# Patient Record
Sex: MALE | Race: WHITE | NOT HISPANIC OR LATINO | Employment: OTHER | URBAN - METROPOLITAN AREA
[De-identification: names, ages, dates, MRNs, and addresses within clinical notes are randomized per-mention and may not be internally consistent; named-entity substitution may affect disease eponyms.]

---

## 2017-05-10 ENCOUNTER — TRANSFERRED RECORDS (OUTPATIENT)
Dept: HEALTH INFORMATION MANAGEMENT | Facility: CLINIC | Age: 67
End: 2017-05-10

## 2017-07-25 ENCOUNTER — TRANSFERRED RECORDS (OUTPATIENT)
Dept: HEALTH INFORMATION MANAGEMENT | Facility: CLINIC | Age: 67
End: 2017-07-25

## 2017-09-23 ENCOUNTER — TRANSFERRED RECORDS (OUTPATIENT)
Dept: HEALTH INFORMATION MANAGEMENT | Facility: CLINIC | Age: 67
End: 2017-09-23

## 2017-09-28 ENCOUNTER — MEDICAL CORRESPONDENCE (OUTPATIENT)
Dept: HEALTH INFORMATION MANAGEMENT | Facility: CLINIC | Age: 67
End: 2017-09-28

## 2018-03-07 ENCOUNTER — AMBULATORY - HEALTHEAST (OUTPATIENT)
Dept: ADMINISTRATIVE | Facility: CLINIC | Age: 68
End: 2018-03-07

## 2018-03-12 ENCOUNTER — OFFICE VISIT - HEALTHEAST (OUTPATIENT)
Dept: GERIATRICS | Facility: CLINIC | Age: 68
End: 2018-03-12

## 2018-03-12 DIAGNOSIS — G81.94 LEFT HEMIPARESIS (H): ICD-10-CM

## 2018-03-12 DIAGNOSIS — E11.9 TYPE 2 DIABETES MELLITUS (H): ICD-10-CM

## 2018-03-12 DIAGNOSIS — I10 HYPERTENSION: ICD-10-CM

## 2018-03-12 DIAGNOSIS — I63.9 ACUTE CVA (CEREBROVASCULAR ACCIDENT) (H): ICD-10-CM

## 2018-03-12 DIAGNOSIS — R47.02 DYSPHASIA: ICD-10-CM

## 2018-03-12 DIAGNOSIS — I25.10 CORONARY ARTERY DISEASE: ICD-10-CM

## 2018-03-13 ENCOUNTER — OFFICE VISIT - HEALTHEAST (OUTPATIENT)
Dept: GERIATRICS | Facility: CLINIC | Age: 68
End: 2018-03-13

## 2018-03-13 DIAGNOSIS — I25.10 CORONARY ARTERY DISEASE: ICD-10-CM

## 2018-03-13 DIAGNOSIS — I63.9 ACUTE CVA (CEREBROVASCULAR ACCIDENT) (H): ICD-10-CM

## 2018-03-13 DIAGNOSIS — E11.9 TYPE 2 DIABETES MELLITUS (H): ICD-10-CM

## 2018-03-13 DIAGNOSIS — R47.02 DYSPHASIA: ICD-10-CM

## 2018-03-13 DIAGNOSIS — I10 HYPERTENSION: ICD-10-CM

## 2018-03-13 DIAGNOSIS — G81.94 LEFT HEMIPARESIS (H): ICD-10-CM

## 2018-03-13 DIAGNOSIS — E78.5 HYPERLIPIDEMIA: ICD-10-CM

## 2018-03-15 ENCOUNTER — RECORDS - HEALTHEAST (OUTPATIENT)
Dept: LAB | Facility: CLINIC | Age: 68
End: 2018-03-15

## 2018-03-15 ENCOUNTER — OFFICE VISIT - HEALTHEAST (OUTPATIENT)
Dept: GERIATRICS | Facility: CLINIC | Age: 68
End: 2018-03-15

## 2018-03-15 DIAGNOSIS — I25.10 CORONARY ARTERY DISEASE: ICD-10-CM

## 2018-03-15 DIAGNOSIS — M25.512 LEFT SHOULDER PAIN: ICD-10-CM

## 2018-03-15 DIAGNOSIS — E11.9 TYPE 2 DIABETES MELLITUS (H): ICD-10-CM

## 2018-03-15 DIAGNOSIS — I63.9 ACUTE CVA (CEREBROVASCULAR ACCIDENT) (H): ICD-10-CM

## 2018-03-15 DIAGNOSIS — R09.02 HYPOXIA: ICD-10-CM

## 2018-03-15 DIAGNOSIS — I10 HYPERTENSION: ICD-10-CM

## 2018-03-15 DIAGNOSIS — G81.94 LEFT HEMIPARESIS (H): ICD-10-CM

## 2018-03-15 DIAGNOSIS — R47.02 DYSPHASIA: ICD-10-CM

## 2018-03-15 LAB
ANION GAP SERPL CALCULATED.3IONS-SCNC: 6 MMOL/L (ref 5–18)
BUN SERPL-MCNC: 12 MG/DL (ref 8–22)
CALCIUM SERPL-MCNC: 8.8 MG/DL (ref 8.5–10.5)
CHLORIDE BLD-SCNC: 108 MMOL/L (ref 98–107)
CO2 SERPL-SCNC: 28 MMOL/L (ref 22–31)
CREAT SERPL-MCNC: 0.82 MG/DL (ref 0.7–1.3)
ERYTHROCYTE [DISTWIDTH] IN BLOOD BY AUTOMATED COUNT: 12.4 % (ref 11–14.5)
GFR SERPL CREATININE-BSD FRML MDRD: >60 ML/MIN/1.73M2
GLUCOSE BLD-MCNC: 141 MG/DL (ref 70–125)
HCT VFR BLD AUTO: 31 % (ref 40–54)
HGB BLD-MCNC: 10.6 G/DL (ref 14–18)
MCH RBC QN AUTO: 32.5 PG (ref 27–34)
MCHC RBC AUTO-ENTMCNC: 34.2 G/DL (ref 32–36)
MCV RBC AUTO: 95 FL (ref 80–100)
PLATELET # BLD AUTO: 308 THOU/UL (ref 140–440)
PMV BLD AUTO: 10.5 FL (ref 8.5–12.5)
POTASSIUM BLD-SCNC: 3.8 MMOL/L (ref 3.5–5)
RBC # BLD AUTO: 3.26 MILL/UL (ref 4.4–6.2)
SODIUM SERPL-SCNC: 142 MMOL/L (ref 136–145)
WBC: 6.6 THOU/UL (ref 4–11)

## 2018-03-19 ENCOUNTER — OFFICE VISIT - HEALTHEAST (OUTPATIENT)
Dept: GERIATRICS | Facility: CLINIC | Age: 68
End: 2018-03-19

## 2018-03-19 DIAGNOSIS — I63.9 CVA (CEREBRAL VASCULAR ACCIDENT) (H): ICD-10-CM

## 2018-03-19 DIAGNOSIS — R13.10 DYSPHAGIA: ICD-10-CM

## 2018-03-19 DIAGNOSIS — G81.94 LEFT HEMIPARESIS (H): ICD-10-CM

## 2018-03-19 DIAGNOSIS — I10 HYPERTENSION: ICD-10-CM

## 2018-03-19 DIAGNOSIS — R47.1 DYSARTHRIA: ICD-10-CM

## 2018-03-20 ENCOUNTER — RECORDS - HEALTHEAST (OUTPATIENT)
Dept: LAB | Facility: CLINIC | Age: 68
End: 2018-03-20

## 2018-03-20 LAB
C DIFF TOX B STL QL: NEGATIVE
RIBOTYPE 027/NAP1/BI: NORMAL

## 2018-03-21 ENCOUNTER — RECORDS - HEALTHEAST (OUTPATIENT)
Dept: LAB | Facility: CLINIC | Age: 68
End: 2018-03-21

## 2018-03-22 ENCOUNTER — OFFICE VISIT - HEALTHEAST (OUTPATIENT)
Dept: GERIATRICS | Facility: CLINIC | Age: 68
End: 2018-03-22

## 2018-03-22 DIAGNOSIS — R47.1 DYSARTHRIA: ICD-10-CM

## 2018-03-22 DIAGNOSIS — E11.9 DIABETES MELLITUS (H): ICD-10-CM

## 2018-03-22 DIAGNOSIS — I63.9 CVA (CEREBRAL VASCULAR ACCIDENT) (H): ICD-10-CM

## 2018-03-22 DIAGNOSIS — R13.10 DYSPHAGIA: ICD-10-CM

## 2018-03-22 LAB — HGB BLD-MCNC: 11.3 G/DL (ref 14–18)

## 2018-03-26 ENCOUNTER — RECORDS - HEALTHEAST (OUTPATIENT)
Dept: LAB | Facility: CLINIC | Age: 68
End: 2018-03-26

## 2018-03-26 ENCOUNTER — OFFICE VISIT - HEALTHEAST (OUTPATIENT)
Dept: GERIATRICS | Facility: CLINIC | Age: 68
End: 2018-03-26

## 2018-03-26 DIAGNOSIS — R04.0 EPISTAXIS: ICD-10-CM

## 2018-03-26 DIAGNOSIS — I69.921 DYSPHASIA DUE TO CEREBROVASCULAR DISEASE: ICD-10-CM

## 2018-03-26 DIAGNOSIS — R19.7 DIARRHEA: ICD-10-CM

## 2018-03-26 DIAGNOSIS — R11.2 NAUSEA AND VOMITING: ICD-10-CM

## 2018-03-26 LAB
ANION GAP SERPL CALCULATED.3IONS-SCNC: 16 MMOL/L (ref 5–18)
BASOPHILS # BLD AUTO: 0.1 THOU/UL (ref 0–0.2)
BASOPHILS NFR BLD AUTO: 1 % (ref 0–2)
BNP SERPL-MCNC: <10 PG/ML (ref 0–65)
BUN SERPL-MCNC: 18 MG/DL (ref 8–22)
CALCIUM SERPL-MCNC: 9.6 MG/DL (ref 8.5–10.5)
CHLORIDE BLD-SCNC: 101 MMOL/L (ref 98–107)
CO2 SERPL-SCNC: 24 MMOL/L (ref 22–31)
CREAT SERPL-MCNC: 0.79 MG/DL (ref 0.7–1.3)
EOSINOPHIL # BLD AUTO: 0.4 THOU/UL (ref 0–0.4)
EOSINOPHIL NFR BLD AUTO: 4 % (ref 0–6)
ERYTHROCYTE [DISTWIDTH] IN BLOOD BY AUTOMATED COUNT: 12.6 % (ref 11–14.5)
GFR SERPL CREATININE-BSD FRML MDRD: >60 ML/MIN/1.73M2
GLUCOSE BLD-MCNC: 67 MG/DL (ref 70–125)
HCT VFR BLD AUTO: 35.2 % (ref 40–54)
HGB BLD-MCNC: 12 G/DL (ref 14–18)
LYMPHOCYTES # BLD AUTO: 0.9 THOU/UL (ref 0.8–4.4)
LYMPHOCYTES NFR BLD AUTO: 9 % (ref 20–40)
MCH RBC QN AUTO: 31.9 PG (ref 27–34)
MCHC RBC AUTO-ENTMCNC: 34.1 G/DL (ref 32–36)
MCV RBC AUTO: 94 FL (ref 80–100)
MONOCYTES # BLD AUTO: 0.8 THOU/UL (ref 0–0.9)
MONOCYTES NFR BLD AUTO: 7 % (ref 2–10)
NEUTROPHILS # BLD AUTO: 8.2 THOU/UL (ref 2–7.7)
NEUTROPHILS NFR BLD AUTO: 79 % (ref 50–70)
PLATELET # BLD AUTO: 307 THOU/UL (ref 140–440)
PMV BLD AUTO: 10.2 FL (ref 8.5–12.5)
POTASSIUM BLD-SCNC: 4.1 MMOL/L (ref 3.5–5)
RBC # BLD AUTO: 3.76 MILL/UL (ref 4.4–6.2)
SODIUM SERPL-SCNC: 141 MMOL/L (ref 136–145)
WBC: 10.2 THOU/UL (ref 4–11)

## 2018-03-27 ENCOUNTER — OFFICE VISIT - HEALTHEAST (OUTPATIENT)
Dept: GERIATRICS | Facility: CLINIC | Age: 68
End: 2018-03-27

## 2018-03-27 DIAGNOSIS — G81.94 LEFT HEMIPARESIS (H): ICD-10-CM

## 2018-03-27 DIAGNOSIS — E11.9 DIABETES MELLITUS (H): ICD-10-CM

## 2018-03-27 DIAGNOSIS — I10 HYPERTENSION: ICD-10-CM

## 2018-03-27 DIAGNOSIS — R04.0 EPISTAXIS: ICD-10-CM

## 2018-03-27 DIAGNOSIS — R00.0 TACHYCARDIA: ICD-10-CM

## 2018-03-27 DIAGNOSIS — I25.10 CORONARY ARTERY DISEASE: ICD-10-CM

## 2018-03-27 DIAGNOSIS — R09.81 NASAL CONGESTION: ICD-10-CM

## 2018-03-27 DIAGNOSIS — I69.921 DYSPHASIA DUE TO CEREBROVASCULAR DISEASE: ICD-10-CM

## 2018-03-27 DIAGNOSIS — I63.9 CVA (CEREBRAL VASCULAR ACCIDENT) (H): ICD-10-CM

## 2018-03-27 LAB
ALBUMIN UR-MCNC: ABNORMAL MG/DL
AMORPH CRY #/AREA URNS HPF: ABNORMAL /[HPF]
APPEARANCE UR: ABNORMAL
BACTERIA #/AREA URNS HPF: ABNORMAL HPF
BILIRUB UR QL STRIP: NEGATIVE
CAOX CRY #/AREA URNS HPF: PRESENT /[HPF]
COLOR UR AUTO: ABNORMAL
GLUCOSE UR STRIP-MCNC: NEGATIVE MG/DL
HGB UR QL STRIP: NEGATIVE
HYALINE CASTS #/AREA URNS LPF: ABNORMAL LPF
KETONES UR STRIP-MCNC: ABNORMAL MG/DL
LEUKOCYTE ESTERASE UR QL STRIP: NEGATIVE
MUCOUS THREADS #/AREA URNS LPF: ABNORMAL LPF
NITRATE UR QL: NEGATIVE
PH UR STRIP: 6 [PH] (ref 4.5–8)
RBC #/AREA URNS AUTO: ABNORMAL HPF
SP GR UR STRIP: 1.03 (ref 1–1.03)
SQUAMOUS #/AREA URNS AUTO: ABNORMAL LPF
UROBILINOGEN UR STRIP-ACNC: ABNORMAL
WBC #/AREA URNS AUTO: ABNORMAL HPF
WBC CLUMPS #/AREA URNS HPF: ABNORMAL /[HPF]

## 2018-03-28 ENCOUNTER — RECORDS - HEALTHEAST (OUTPATIENT)
Dept: ADMINISTRATIVE | Facility: OTHER | Age: 68
End: 2018-03-28

## 2018-03-28 LAB — BACTERIA SPEC CULT: NO GROWTH

## 2018-03-29 ENCOUNTER — OFFICE VISIT - HEALTHEAST (OUTPATIENT)
Dept: GERIATRICS | Facility: CLINIC | Age: 68
End: 2018-03-29

## 2018-03-29 DIAGNOSIS — G81.94 LEFT HEMIPARESIS (H): ICD-10-CM

## 2018-03-29 DIAGNOSIS — I25.10 CORONARY ARTERY DISEASE: ICD-10-CM

## 2018-03-29 DIAGNOSIS — Z91.89 AT RISK FOR ASPIRATION: ICD-10-CM

## 2018-03-29 DIAGNOSIS — I63.9 CVA (CEREBRAL VASCULAR ACCIDENT) (H): ICD-10-CM

## 2018-03-29 DIAGNOSIS — I10 HYPERTENSION: ICD-10-CM

## 2018-03-29 DIAGNOSIS — E11.9 DIABETES MELLITUS (H): ICD-10-CM

## 2018-03-29 DIAGNOSIS — R05.9 COUGH: ICD-10-CM

## 2018-03-29 DIAGNOSIS — R00.0 TACHYCARDIA: ICD-10-CM

## 2018-03-29 DIAGNOSIS — I69.921 DYSPHASIA DUE TO CEREBROVASCULAR DISEASE: ICD-10-CM

## 2018-04-03 ENCOUNTER — OFFICE VISIT - HEALTHEAST (OUTPATIENT)
Dept: GERIATRICS | Facility: CLINIC | Age: 68
End: 2018-04-03

## 2018-04-03 DIAGNOSIS — I63.9 CVA (CEREBRAL VASCULAR ACCIDENT) (H): ICD-10-CM

## 2018-04-03 DIAGNOSIS — E11.9 DIABETES MELLITUS (H): ICD-10-CM

## 2018-04-03 DIAGNOSIS — R05.9 COUGH: ICD-10-CM

## 2018-04-03 DIAGNOSIS — G81.94 LEFT HEMIPARESIS (H): ICD-10-CM

## 2018-04-03 DIAGNOSIS — I69.921 DYSPHASIA DUE TO CEREBROVASCULAR DISEASE: ICD-10-CM

## 2018-04-03 DIAGNOSIS — K21.9 GERD (GASTROESOPHAGEAL REFLUX DISEASE): ICD-10-CM

## 2018-04-03 DIAGNOSIS — R00.0 TACHYCARDIA: ICD-10-CM

## 2018-04-03 DIAGNOSIS — R11.2 NAUSEA AND VOMITING: ICD-10-CM

## 2018-04-05 ENCOUNTER — OFFICE VISIT - HEALTHEAST (OUTPATIENT)
Dept: GERIATRICS | Facility: CLINIC | Age: 68
End: 2018-04-05

## 2018-04-05 DIAGNOSIS — I25.10 CORONARY ARTERY DISEASE: ICD-10-CM

## 2018-04-05 DIAGNOSIS — K21.9 GERD (GASTROESOPHAGEAL REFLUX DISEASE): ICD-10-CM

## 2018-04-05 DIAGNOSIS — I63.9 CVA (CEREBRAL VASCULAR ACCIDENT) (H): ICD-10-CM

## 2018-04-05 DIAGNOSIS — I69.921 DYSPHASIA DUE TO CEREBROVASCULAR DISEASE: ICD-10-CM

## 2018-04-05 DIAGNOSIS — Z91.89 AT RISK FOR ASPIRATION: ICD-10-CM

## 2018-04-05 DIAGNOSIS — I10 HYPERTENSION: ICD-10-CM

## 2018-04-05 DIAGNOSIS — G81.94 LEFT HEMIPARESIS (H): ICD-10-CM

## 2018-04-05 DIAGNOSIS — E11.9 DIABETES MELLITUS (H): ICD-10-CM

## 2018-04-09 ENCOUNTER — RECORDS - HEALTHEAST (OUTPATIENT)
Dept: LAB | Facility: CLINIC | Age: 68
End: 2018-04-09

## 2018-04-09 ENCOUNTER — OFFICE VISIT - HEALTHEAST (OUTPATIENT)
Dept: GERIATRICS | Facility: CLINIC | Age: 68
End: 2018-04-09

## 2018-04-09 ENCOUNTER — RECORDS - HEALTHEAST (OUTPATIENT)
Dept: ADMINISTRATIVE | Facility: OTHER | Age: 68
End: 2018-04-09

## 2018-04-09 DIAGNOSIS — R05.8 PRODUCTIVE COUGH: ICD-10-CM

## 2018-04-09 DIAGNOSIS — G81.94 LEFT HEMIPARESIS (H): ICD-10-CM

## 2018-04-09 DIAGNOSIS — I69.921 DYSPHASIA DUE TO CEREBROVASCULAR DISEASE: ICD-10-CM

## 2018-04-09 DIAGNOSIS — R06.02 SHORTNESS OF BREATH: ICD-10-CM

## 2018-04-09 LAB
BNP SERPL-MCNC: 28 PG/ML (ref 0–65)
HGB BLD-MCNC: 11.5 G/DL (ref 14–18)
WBC: 10.3 THOU/UL (ref 4–11)

## 2018-04-10 ENCOUNTER — RECORDS - HEALTHEAST (OUTPATIENT)
Dept: LAB | Facility: CLINIC | Age: 68
End: 2018-04-10

## 2018-04-10 ENCOUNTER — OFFICE VISIT - HEALTHEAST (OUTPATIENT)
Dept: GERIATRICS | Facility: CLINIC | Age: 68
End: 2018-04-10

## 2018-04-10 DIAGNOSIS — I10 HYPERTENSION: ICD-10-CM

## 2018-04-10 DIAGNOSIS — K21.9 GERD (GASTROESOPHAGEAL REFLUX DISEASE): ICD-10-CM

## 2018-04-10 DIAGNOSIS — I63.9 CVA (CEREBRAL VASCULAR ACCIDENT) (H): ICD-10-CM

## 2018-04-10 DIAGNOSIS — G81.94 LEFT HEMIPARESIS (H): ICD-10-CM

## 2018-04-10 DIAGNOSIS — J44.9 COPD (CHRONIC OBSTRUCTIVE PULMONARY DISEASE) (H): ICD-10-CM

## 2018-04-10 DIAGNOSIS — R05.8 PRODUCTIVE COUGH: ICD-10-CM

## 2018-04-10 DIAGNOSIS — I69.921 DYSPHASIA DUE TO CEREBROVASCULAR DISEASE: ICD-10-CM

## 2018-04-10 DIAGNOSIS — R06.02 SHORTNESS OF BREATH: ICD-10-CM

## 2018-04-10 LAB
ALBUMIN UR-MCNC: ABNORMAL MG/DL
APPEARANCE UR: CLEAR
BACTERIA #/AREA URNS HPF: ABNORMAL HPF
BILIRUB UR QL STRIP: NEGATIVE
COLOR UR AUTO: YELLOW
GLUCOSE UR STRIP-MCNC: NEGATIVE MG/DL
HGB UR QL STRIP: ABNORMAL
HYALINE CASTS #/AREA URNS LPF: ABNORMAL LPF
KETONES UR STRIP-MCNC: NEGATIVE MG/DL
LEUKOCYTE ESTERASE UR QL STRIP: NEGATIVE
MUCOUS THREADS #/AREA URNS LPF: ABNORMAL LPF
NITRATE UR QL: NEGATIVE
PH UR STRIP: 6.5 [PH] (ref 4.5–8)
RBC #/AREA URNS AUTO: ABNORMAL HPF
SP GR UR STRIP: 1.03 (ref 1–1.03)
SPERM #/AREA URNS HPF: PRESENT /[HPF]
SQUAMOUS #/AREA URNS AUTO: ABNORMAL LPF
UROBILINOGEN UR STRIP-ACNC: ABNORMAL
WBC #/AREA URNS AUTO: ABNORMAL HPF

## 2018-04-11 LAB — BACTERIA SPEC CULT: NO GROWTH

## 2018-04-12 ENCOUNTER — OFFICE VISIT - HEALTHEAST (OUTPATIENT)
Dept: GERIATRICS | Facility: CLINIC | Age: 68
End: 2018-04-12

## 2018-04-12 DIAGNOSIS — E11.9 DIABETES MELLITUS (H): ICD-10-CM
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
## 2018-04-12 DIAGNOSIS — I63.9 CVA (CEREBRAL VASCULAR ACCIDENT) (H): ICD-10-CM

## 2018-04-12 DIAGNOSIS — G81.94 LEFT HEMIPARESIS (H): ICD-10-CM

## 2018-04-12 DIAGNOSIS — I69.921 DYSPHASIA DUE TO CEREBROVASCULAR DISEASE: ICD-10-CM

## 2018-04-12 DIAGNOSIS — I10 HYPERTENSION: ICD-10-CM

## 2018-04-12 DIAGNOSIS — J44.9 COPD (CHRONIC OBSTRUCTIVE PULMONARY DISEASE) (H): ICD-10-CM

## 2018-04-16 ENCOUNTER — OFFICE VISIT - HEALTHEAST (OUTPATIENT)
Dept: GERIATRICS | Facility: CLINIC | Age: 68
End: 2018-04-16

## 2018-04-16 DIAGNOSIS — I63.9 CVA (CEREBRAL VASCULAR ACCIDENT) (H): ICD-10-CM

## 2018-04-16 DIAGNOSIS — R13.10 DYSPHAGIA: ICD-10-CM

## 2018-04-16 DIAGNOSIS — I10 HYPERTENSION: ICD-10-CM

## 2018-04-16 DIAGNOSIS — J44.9 COPD (CHRONIC OBSTRUCTIVE PULMONARY DISEASE) (H): ICD-10-CM

## 2018-04-19 ENCOUNTER — OFFICE VISIT - HEALTHEAST (OUTPATIENT)
Dept: GERIATRICS | Facility: CLINIC | Age: 68
End: 2018-04-19

## 2018-04-19 DIAGNOSIS — R13.10 DYSPHAGIA: ICD-10-CM

## 2018-04-19 DIAGNOSIS — I63.9 CVA (CEREBRAL VASCULAR ACCIDENT) (H): ICD-10-CM

## 2018-04-19 DIAGNOSIS — G81.94 LEFT HEMIPARESIS (H): ICD-10-CM

## 2018-04-19 DIAGNOSIS — J44.9 COPD (CHRONIC OBSTRUCTIVE PULMONARY DISEASE) (H): ICD-10-CM

## 2018-04-20 ENCOUNTER — RECORDS - HEALTHEAST (OUTPATIENT)
Dept: ADMINISTRATIVE | Facility: OTHER | Age: 68
End: 2018-04-20

## 2018-04-24 ENCOUNTER — OFFICE VISIT - HEALTHEAST (OUTPATIENT)
Dept: GERIATRICS | Facility: CLINIC | Age: 68
End: 2018-04-24

## 2018-04-24 DIAGNOSIS — J44.9 COPD (CHRONIC OBSTRUCTIVE PULMONARY DISEASE) (H): ICD-10-CM

## 2018-04-24 DIAGNOSIS — R05.9 COUGH: ICD-10-CM

## 2018-04-24 DIAGNOSIS — I63.9 CVA (CEREBRAL VASCULAR ACCIDENT) (H): ICD-10-CM

## 2018-04-24 DIAGNOSIS — E11.9 DIABETES MELLITUS (H): ICD-10-CM

## 2018-04-24 DIAGNOSIS — G81.94 LEFT HEMIPARESIS (H): ICD-10-CM

## 2018-04-25 ENCOUNTER — RECORDS - HEALTHEAST (OUTPATIENT)
Dept: LAB | Facility: CLINIC | Age: 68
End: 2018-04-25

## 2018-04-25 LAB
ALBUMIN UR-MCNC: ABNORMAL MG/DL
APPEARANCE UR: CLEAR
BACTERIA #/AREA URNS HPF: ABNORMAL HPF
BILIRUB UR QL STRIP: NEGATIVE
COLOR UR AUTO: YELLOW
GLUCOSE UR STRIP-MCNC: NEGATIVE MG/DL
HGB BLD-MCNC: 11.2 G/DL (ref 14–18)
HGB UR QL STRIP: ABNORMAL
KETONES UR STRIP-MCNC: NEGATIVE MG/DL
LEUKOCYTE ESTERASE UR QL STRIP: NEGATIVE
MUCOUS THREADS #/AREA URNS LPF: ABNORMAL LPF
NITRATE UR QL: NEGATIVE
PH UR STRIP: 7 [PH] (ref 4.5–8)
RBC #/AREA URNS AUTO: ABNORMAL HPF
SP GR UR STRIP: 1.02 (ref 1–1.03)
SQUAMOUS #/AREA URNS AUTO: ABNORMAL LPF
UROBILINOGEN UR STRIP-ACNC: ABNORMAL
WBC #/AREA URNS AUTO: ABNORMAL HPF

## 2018-04-26 ENCOUNTER — OFFICE VISIT - HEALTHEAST (OUTPATIENT)
Dept: GERIATRICS | Facility: CLINIC | Age: 68
End: 2018-04-26

## 2018-04-26 DIAGNOSIS — J44.9 COPD (CHRONIC OBSTRUCTIVE PULMONARY DISEASE) (H): ICD-10-CM

## 2018-04-26 DIAGNOSIS — G81.94 LEFT HEMIPARESIS (H): ICD-10-CM

## 2018-04-26 DIAGNOSIS — R05.9 COUGH: ICD-10-CM

## 2018-04-26 DIAGNOSIS — I25.10 CORONARY ARTERY DISEASE: ICD-10-CM

## 2018-04-26 DIAGNOSIS — I10 HYPERTENSION: ICD-10-CM

## 2018-04-26 DIAGNOSIS — R13.10 DYSPHAGIA: ICD-10-CM

## 2018-04-26 DIAGNOSIS — I63.9 CVA (CEREBRAL VASCULAR ACCIDENT) (H): ICD-10-CM

## 2018-04-26 DIAGNOSIS — N50.1 BLEEDING OF PENIS: ICD-10-CM

## 2018-04-26 LAB — BACTERIA SPEC CULT: NO GROWTH

## 2018-05-01 ENCOUNTER — OFFICE VISIT - HEALTHEAST (OUTPATIENT)
Dept: GERIATRICS | Facility: CLINIC | Age: 68
End: 2018-05-01

## 2018-05-01 DIAGNOSIS — I10 HYPERTENSION: ICD-10-CM

## 2018-05-01 DIAGNOSIS — G81.94 LEFT HEMIPARESIS (H): ICD-10-CM

## 2018-05-01 DIAGNOSIS — R13.10 DYSPHAGIA: ICD-10-CM

## 2018-05-01 DIAGNOSIS — I63.9 CVA (CEREBRAL VASCULAR ACCIDENT) (H): ICD-10-CM

## 2018-05-01 DIAGNOSIS — J44.9 COPD (CHRONIC OBSTRUCTIVE PULMONARY DISEASE) (H): ICD-10-CM

## 2018-05-01 DIAGNOSIS — E11.9 DIABETES MELLITUS (H): ICD-10-CM

## 2018-05-03 ENCOUNTER — OFFICE VISIT - HEALTHEAST (OUTPATIENT)
Dept: GERIATRICS | Facility: CLINIC | Age: 68
End: 2018-05-03

## 2018-05-03 DIAGNOSIS — G81.94 LEFT HEMIPARESIS (H): ICD-10-CM

## 2018-05-03 DIAGNOSIS — I63.9 CVA (CEREBRAL VASCULAR ACCIDENT) (H): ICD-10-CM

## 2018-05-03 DIAGNOSIS — J44.9 COPD (CHRONIC OBSTRUCTIVE PULMONARY DISEASE) (H): ICD-10-CM

## 2018-05-03 DIAGNOSIS — I10 HYPERTENSION: ICD-10-CM

## 2018-05-05 ENCOUNTER — RECORDS - HEALTHEAST (OUTPATIENT)
Dept: ADMINISTRATIVE | Facility: OTHER | Age: 68
End: 2018-05-05

## 2018-05-08 ENCOUNTER — OFFICE VISIT - HEALTHEAST (OUTPATIENT)
Dept: GERIATRICS | Facility: CLINIC | Age: 68
End: 2018-05-08

## 2018-05-08 DIAGNOSIS — G81.94 LEFT HEMIPARESIS (H): ICD-10-CM

## 2018-05-08 DIAGNOSIS — J44.9 COPD (CHRONIC OBSTRUCTIVE PULMONARY DISEASE) (H): ICD-10-CM

## 2018-05-08 DIAGNOSIS — E11.9 DIABETES MELLITUS (H): ICD-10-CM

## 2018-05-08 DIAGNOSIS — I63.9 CVA (CEREBRAL VASCULAR ACCIDENT) (H): ICD-10-CM

## 2018-05-09 ENCOUNTER — RECORDS - HEALTHEAST (OUTPATIENT)
Dept: LAB | Facility: CLINIC | Age: 68
End: 2018-05-09

## 2018-05-09 LAB
ANION GAP SERPL CALCULATED.3IONS-SCNC: 8 MMOL/L (ref 5–18)
BNP SERPL-MCNC: 24 PG/ML (ref 0–65)
BUN SERPL-MCNC: 18 MG/DL (ref 8–22)
CALCIUM SERPL-MCNC: 9.3 MG/DL (ref 8.5–10.5)
CHLORIDE BLD-SCNC: 104 MMOL/L (ref 98–107)
CO2 SERPL-SCNC: 29 MMOL/L (ref 22–31)
CREAT SERPL-MCNC: 0.84 MG/DL (ref 0.7–1.3)
ERYTHROCYTE [DISTWIDTH] IN BLOOD BY AUTOMATED COUNT: 13.3 % (ref 11–14.5)
GFR SERPL CREATININE-BSD FRML MDRD: >60 ML/MIN/1.73M2
GLUCOSE BLD-MCNC: 99 MG/DL (ref 70–125)
HCT VFR BLD AUTO: 37.5 % (ref 40–54)
HGB BLD-MCNC: 12.4 G/DL (ref 14–18)
MCH RBC QN AUTO: 30.4 PG (ref 27–34)
MCHC RBC AUTO-ENTMCNC: 33.1 G/DL (ref 32–36)
MCV RBC AUTO: 92 FL (ref 80–100)
PLATELET # BLD AUTO: 240 THOU/UL (ref 140–440)
PMV BLD AUTO: 10.7 FL (ref 8.5–12.5)
POTASSIUM BLD-SCNC: 4 MMOL/L (ref 3.5–5)
PSA SERPL-MCNC: 2.4 NG/ML (ref 0–4.5)
RBC # BLD AUTO: 4.08 MILL/UL (ref 4.4–6.2)
SODIUM SERPL-SCNC: 141 MMOL/L (ref 136–145)
WBC: 11.5 THOU/UL (ref 4–11)

## 2018-05-11 ENCOUNTER — OFFICE VISIT - HEALTHEAST (OUTPATIENT)
Dept: GERIATRICS | Facility: CLINIC | Age: 68
End: 2018-05-11

## 2018-05-11 DIAGNOSIS — J44.9 COPD (CHRONIC OBSTRUCTIVE PULMONARY DISEASE) (H): ICD-10-CM

## 2018-05-11 DIAGNOSIS — G81.94 LEFT HEMIPARESIS (H): ICD-10-CM

## 2018-05-11 DIAGNOSIS — I63.9 CVA (CEREBRAL VASCULAR ACCIDENT) (H): ICD-10-CM

## 2018-05-11 DIAGNOSIS — E11.9 DIABETES MELLITUS (H): ICD-10-CM

## 2018-05-15 ENCOUNTER — OFFICE VISIT - HEALTHEAST (OUTPATIENT)
Dept: GERIATRICS | Facility: CLINIC | Age: 68
End: 2018-05-15

## 2018-05-15 DIAGNOSIS — I63.9 CVA (CEREBRAL VASCULAR ACCIDENT) (H): ICD-10-CM

## 2018-05-15 DIAGNOSIS — J44.9 COPD (CHRONIC OBSTRUCTIVE PULMONARY DISEASE) (H): ICD-10-CM

## 2018-05-15 DIAGNOSIS — G81.94 LEFT HEMIPARESIS (H): ICD-10-CM

## 2018-05-15 DIAGNOSIS — E11.9 DIABETES MELLITUS (H): ICD-10-CM

## 2018-05-16 ENCOUNTER — RECORDS - HEALTHEAST (OUTPATIENT)
Dept: LAB | Facility: CLINIC | Age: 68
End: 2018-05-16

## 2018-05-16 LAB
ERYTHROCYTE [DISTWIDTH] IN BLOOD BY AUTOMATED COUNT: 13.6 % (ref 11–14.5)
HCT VFR BLD AUTO: 39.9 % (ref 40–54)
HGB BLD-MCNC: 13 G/DL (ref 14–18)
MCH RBC QN AUTO: 30.3 PG (ref 27–34)
MCHC RBC AUTO-ENTMCNC: 32.6 G/DL (ref 32–36)
MCV RBC AUTO: 93 FL (ref 80–100)
PLATELET # BLD AUTO: 265 THOU/UL (ref 140–440)
PMV BLD AUTO: 10.8 FL (ref 8.5–12.5)
PROCALCITONIN SERPL-MCNC: 0.02 NG/ML (ref 0–0.49)
RBC # BLD AUTO: 4.29 MILL/UL (ref 4.4–6.2)
WBC: 11.6 THOU/UL (ref 4–11)

## 2018-05-17 ENCOUNTER — OFFICE VISIT - HEALTHEAST (OUTPATIENT)
Dept: GERIATRICS | Facility: CLINIC | Age: 68
End: 2018-05-17

## 2018-05-17 ENCOUNTER — RECORDS - HEALTHEAST (OUTPATIENT)
Dept: ADMINISTRATIVE | Facility: OTHER | Age: 68
End: 2018-05-17

## 2018-05-17 DIAGNOSIS — J44.1 COPD EXACERBATION (H): ICD-10-CM

## 2018-05-17 DIAGNOSIS — E11.9 DIABETES MELLITUS (H): ICD-10-CM

## 2018-05-17 DIAGNOSIS — J44.9 COPD (CHRONIC OBSTRUCTIVE PULMONARY DISEASE) (H): ICD-10-CM

## 2018-05-21 ENCOUNTER — RECORDS - HEALTHEAST (OUTPATIENT)
Dept: LAB | Facility: CLINIC | Age: 68
End: 2018-05-21

## 2018-05-21 ENCOUNTER — COMMUNICATION - HEALTHEAST (OUTPATIENT)
Dept: PULMONOLOGY | Facility: OTHER | Age: 68
End: 2018-05-21

## 2018-05-21 ENCOUNTER — AMBULATORY - HEALTHEAST (OUTPATIENT)
Dept: PULMONOLOGY | Facility: OTHER | Age: 68
End: 2018-05-21

## 2018-05-21 DIAGNOSIS — J44.9 COPD (CHRONIC OBSTRUCTIVE PULMONARY DISEASE) (H): ICD-10-CM

## 2018-05-21 LAB
ERYTHROCYTE [DISTWIDTH] IN BLOOD BY AUTOMATED COUNT: 13.3 % (ref 11–14.5)
HCT VFR BLD AUTO: 37.1 % (ref 40–54)
HGB BLD-MCNC: 12.5 G/DL (ref 14–18)
MCH RBC QN AUTO: 30.5 PG (ref 27–34)
MCHC RBC AUTO-ENTMCNC: 33.7 G/DL (ref 32–36)
MCV RBC AUTO: 91 FL (ref 80–100)
PLATELET # BLD AUTO: 285 THOU/UL (ref 140–440)
PMV BLD AUTO: 10.5 FL (ref 8.5–12.5)
RBC # BLD AUTO: 4.1 MILL/UL (ref 4.4–6.2)
WBC: 11.9 THOU/UL (ref 4–11)

## 2018-05-22 ENCOUNTER — OFFICE VISIT - HEALTHEAST (OUTPATIENT)
Dept: GERIATRICS | Facility: CLINIC | Age: 68
End: 2018-05-22

## 2018-05-22 DIAGNOSIS — J44.9 COPD (CHRONIC OBSTRUCTIVE PULMONARY DISEASE) (H): ICD-10-CM

## 2018-05-22 DIAGNOSIS — J44.1 COPD EXACERBATION (H): ICD-10-CM

## 2018-05-22 DIAGNOSIS — F48.2 PSEUDOBULBAR AFFECT: ICD-10-CM

## 2018-05-22 DIAGNOSIS — I25.10 CORONARY ARTERY DISEASE: ICD-10-CM

## 2018-05-22 DIAGNOSIS — E11.9 DIABETES MELLITUS (H): ICD-10-CM

## 2018-05-22 DIAGNOSIS — G81.94 LEFT HEMIPARESIS (H): ICD-10-CM

## 2018-05-22 DIAGNOSIS — I63.9 CVA (CEREBRAL VASCULAR ACCIDENT) (H): ICD-10-CM

## 2018-05-23 ENCOUNTER — AMBULATORY - HEALTHEAST (OUTPATIENT)
Dept: GERIATRICS | Facility: CLINIC | Age: 68
End: 2018-05-23

## 2018-05-23 ENCOUNTER — MEDICAL CORRESPONDENCE (OUTPATIENT)
Dept: HEALTH INFORMATION MANAGEMENT | Facility: CLINIC | Age: 68
End: 2018-05-23

## 2018-05-25 ENCOUNTER — MEDICAL CORRESPONDENCE (OUTPATIENT)
Dept: HEALTH INFORMATION MANAGEMENT | Facility: CLINIC | Age: 68
End: 2018-05-25

## 2018-05-29 ENCOUNTER — OFFICE VISIT (OUTPATIENT)
Dept: FAMILY MEDICINE | Facility: CLINIC | Age: 68
End: 2018-05-29
Payer: MEDICARE

## 2018-05-29 ENCOUNTER — COMMUNICATION - HEALTHEAST (OUTPATIENT)
Dept: PULMONOLOGY | Facility: OTHER | Age: 68
End: 2018-05-29

## 2018-05-29 VITALS
DIASTOLIC BLOOD PRESSURE: 76 MMHG | HEART RATE: 132 BPM | HEIGHT: 69 IN | WEIGHT: 156 LBS | OXYGEN SATURATION: 91 % | SYSTOLIC BLOOD PRESSURE: 120 MMHG | BODY MASS INDEX: 23.11 KG/M2 | TEMPERATURE: 97.4 F

## 2018-05-29 DIAGNOSIS — Z86.73 HISTORY OF CVA (CEREBROVASCULAR ACCIDENT): ICD-10-CM

## 2018-05-29 DIAGNOSIS — G81.94 LEFT HEMIPARESIS (H): ICD-10-CM

## 2018-05-29 DIAGNOSIS — E11.9 TYPE 2 DIABETES MELLITUS WITHOUT COMPLICATION, WITHOUT LONG-TERM CURRENT USE OF INSULIN (H): ICD-10-CM

## 2018-05-29 DIAGNOSIS — R00.0 TACHYCARDIA: ICD-10-CM

## 2018-05-29 DIAGNOSIS — I25.10 CORONARY ARTERY DISEASE INVOLVING NATIVE HEART WITHOUT ANGINA PECTORIS, UNSPECIFIED VESSEL OR LESION TYPE: Primary | ICD-10-CM

## 2018-05-29 DIAGNOSIS — Z09 HOSPITAL DISCHARGE FOLLOW-UP: ICD-10-CM

## 2018-05-29 PROCEDURE — 99203 OFFICE O/P NEW LOW 30 MIN: CPT | Performed by: NURSE PRACTITIONER

## 2018-05-29 PROCEDURE — 93000 ELECTROCARDIOGRAM COMPLETE: CPT | Performed by: NURSE PRACTITIONER

## 2018-05-29 RX ORDER — LISINOPRIL 20 MG/1
20 TABLET ORAL
COMMUNITY
Start: 2012-04-06 | End: 2018-05-31

## 2018-05-29 RX ORDER — ASPIRIN 325 MG
325 TABLET ORAL
COMMUNITY
Start: 2012-04-06

## 2018-05-29 NOTE — NURSING NOTE
"Initial /76 (BP Location: Right arm, Patient Position: Chair, Cuff Size: Adult Regular)  Pulse 132  Temp 97.4  F (36.3  C) (Tympanic)  Ht 5' 9\" (1.753 m)  Wt 156 lb (70.8 kg)  SpO2 91%  BMI 23.04 kg/m2 Estimated body mass index is 23.04 kg/(m^2) as calculated from the following:    Height as of this encounter: 5' 9\" (1.753 m).    Weight as of this encounter: 156 lb (70.8 kg). .    Rhiannon Gonzales    "

## 2018-05-29 NOTE — NURSING NOTE
"Initial /76 (BP Location: Right arm, Patient Position: Chair, Cuff Size: Adult Regular)  Pulse 132  Temp 97.4  F (36.3  C) (Tympanic)  Ht 5' 9\" (1.753 m)  Wt 156 lb (70.8 kg)  BMI 23.04 kg/m2 Estimated body mass index is 23.04 kg/(m^2) as calculated from the following:    Height as of this encounter: 5' 9\" (1.753 m).    Weight as of this encounter: 156 lb (70.8 kg). .    Rhiannon Gonzales    "

## 2018-05-29 NOTE — LETTER
[facility]  [address] [facility] [address]  [phone]        July 5, 2018    Tariq Pinzon  [address] [place] [address]  [facility] [address]              Dear Tariq Pinzon    This is to remind you that your Hgb A1C, Fasting Lipid profile and Liver Panel is due.    You may call our office at [phone] to schedule an appointment.    Please disregard this notice if you have already had your labs drawn or made an appointment.        Sincerely,        Jordana Rosenberg CNP

## 2018-05-29 NOTE — PROGRESS NOTES
SUBJECTIVE:   Tariq Pinzon is a 68 year old male who presents to clinic today for the following health issues:      New Patient/Transfer of Care-         Hospital Follow-up Visit: TCU    Hospital/Nursing Home/IP Rehab Facility: [facility]  Date of Admission:   Date of Discharge: 05/22/18  Reason(s) for Admission: CVA            Problems taking medications regularly:  None       Medication changes since discharge: Clopidogrel was D/C's and changed to ASA daily       Problems adhering to non-medication therapy:  None    Summary of hospitalization:  CareEverywhere information obtained and reviewed  Diagnostic Tests/Treatments reviewed.  Follow up needed: pulmonary  Other Healthcare Providers Involved in Patient s Care:         Care Coordination, Specialist appointment - pulmonary and OT and Physical Therapy  Update since discharge: improved.     Post Discharge Medication Reconciliation: discharge medications reconciled, continue medications without change.  Plan of care communicated with patient and family     Coding guidelines for this visit:  Type of Medical   Decision Making Face-to-Face Visit       within 7 Days of discharge Face-to-Face Visit        within 14 days of discharge   Moderate Complexity [id number]   High Complexity [id number]          Patient new to clinic- medical chart reviewed with patient and his daughter. Feeling well- still having left sided weakness. History of CVD, DIABETES MELLITUS and COPD. Wears O2 at night- has appointment with neurology and pulmonary but would like at [facility].     Problem list and histories reviewed & adjusted, as indicated.  Additional history: as documented    Patient Active Problem List   Diagnosis     COPD (chronic obstructive pulmonary disease) (H)     Essential hypertension     Old myocardial infarction     Pulmonary nodule, left     Mixed hyperlipidemia     Past Surgical History:   Procedure Laterality Date     NO HISTORY OF SURGERY        "  Social History   Substance Use Topics     Smoking status: Former Smoker     Packs/day: 2.00     Years: 38.00     Smokeless tobacco: Former User     Quit date: 1/1/2007      Comment: Quit several years back     Alcohol use No     Family History   Problem Relation Age of Onset     Asthma Mother      Asthma Maternal Grandmother      Respiratory Father      Lung Cancer     Respiratory Daughter      Asthma in all 4 daughter     DIABETES Mother      DIABETES Daughter            Reviewed and updated as needed this visit by clinical staff       Reviewed and updated as needed this visit by Provider         ROS:  Constitutional, HEENT, cardiovascular, pulmonary, GI, , musculoskeletal, neuro, skin, endocrine and psych systems are negative, except as otherwise noted.    OBJECTIVE:     /76 (BP Location: Right arm, Patient Position: Chair, Cuff Size: Adult Regular)  Pulse 132  Temp 97.4  F (36.3  C) (Tympanic)  Ht 5' 9\" (1.753 m)  Wt 156 lb (70.8 kg)  SpO2 91%  BMI 23.04 kg/m2  Body mass index is 23.04 kg/(m^2).  GENERAL: healthy, alert and no distress  RESP: lungs clear to auscultation - no rales, rhonchi or wheezes  CV: Tachycardia, normal S1 S2, no S3 or S4, no murmur, click or rub, no peripheral edema and peripheral pulses strong  MS: left sided hemiparesis     Diagnostic Test Results:  none     ASSESSMENT/PLAN:       1. Hospital discharge follow-up    - CARE COORDINATION REFERRAL    2. History of CVA (cerebrovascular accident)    - NEUROLOGY ADULT REFERRAL  - CARE COORDINATION REFERRAL    3. Coronary artery disease involving native heart without angina pectoris, unspecified vessel or lesion type    - CARDIOLOGY EVAL ADULT REFERRAL  - Lipid panel reflex to direct LDL Fasting; Future  - ALT; Future    4. Type 2 diabetes mellitus without complication, without long-term current use of insulin (H)    - Hemoglobin A1c; Future    5. Left hemiparesis (H)    - NEUROLOGY ADULT REFERRAL  - CARE COORDINATION " REFERRAL    6. Tachycardia    - EKG 12-lead complete w/read - Clinics    Follow up in 2 weeks     NEO Parada CNP  [facility]

## 2018-05-29 NOTE — PATIENT INSTRUCTIONS
1. Schedule an appointment with Cardiology and Neurology  2. Schedule cholesterol fasting lab work  3. Follow up in 3 months          Thank you for choosing [facility].  You may be receiving a survey in the mail from [employer] regarding your visit today.  Please take a few minutes to complete and return the survey to let us know how we are doing.      If you have questions or concerns, please contact us via Multimedia Plus | [employer] or you can contact your care team at [phone].    Our Clinic hours are:  Monday 6:40 am  to 7:00 pm  Tuesday -Friday 6:40 am to 5:00 pm    The [place] outpatient lab hours are:  Monday - Friday 6:10 am to 4:45 pm  Saturdays 7:00 am to 11:00 am  Appointments are required, call [phone]    If you have clinical questions after hours or would like to schedule an appointment,  call the clinic at [phone].

## 2018-05-29 NOTE — LETTER
[facility]  [address] [facility] [address]  [phone]        August 10, 2018    Tariq Pinzon  [address] [place] [address]              Dear Tariq Pinzon    This is to remind you that your fasting blood work is due. Water is fine.    You may call our office at [phone] to schedule an appointment.    Please disregard this notice if you have already had your labs drawn or made an appointment.        Sincerely,        Jordana Rosenberg MD

## 2018-05-29 NOTE — MR AVS SNAPSHOT
After Visit Summary   5/29/2018    Tariq Pinzon    MRN: [id number]           Patient Information     Date Of Birth          1950        Visit Information        Provider Department      5/29/2018 12:40 PM Jordana Rosenberg APRN [facility]        Today's Diagnoses     Coronary artery disease involving native heart without angina pectoris, unspecified vessel or lesion type    -  1    History of CVA (cerebrovascular accident)        Left hemiparesis (H)        Hospital discharge follow-up          Care Instructions    1. Schedule an appointment with Cardiology and Neurology  2. Schedule cholesterol fasting lab work  3. Follow up in 3 months          Thank you for choosing [facility].  You may be receiving a survey in the mail from [employer] regarding your visit today.  Please take a few minutes to complete and return the survey to let us know how we are doing.      If you have questions or concerns, please contact us via HelpHive or you can contact your care team at [phone].    Our Clinic hours are:  Monday 6:40 am  to 7:00 pm  Tuesday -Friday 6:40 am to 5:00 pm    The [place] outpatient lab hours are:  Monday - Friday 6:10 am to 4:45 pm  Saturdays 7:00 am to 11:00 am  Appointments are required, call [phone]    If you have clinical questions after hours or would like to schedule an appointment,  call the clinic at [phone].          Follow-ups after your visit        Additional Services     CARDIOLOGY EVAL ADULT REFERRAL       Your provider has referred you to:  [facility] [phone]   [email or web address]    Please be aware that coverage of these services is subject to the terms and limitations of your health insurance plan.  Call member services at your health plan with any benefit or coverage questions.      Type of Referral:  New Cardiology Consult    Timeframe requested:  Within  1 month    Please bring the following to your appointment:  >>   Any x-rays, CTs or MRIs which have been performed.  Contact the facility where they were done to arrange for  prior to your scheduled appointment.  Any new CT, MRI or other procedures ordered by your specialist must be performed at a [facility] or coordinated by your clinic's referral office.   >>   List of current medications  >>   This referral request   >>   Any documents/labs given to you for this referral            CARE COORDINATION REFERRAL       Services are provided by a care coordinator for people with complex needs such as; medical, social, or  financial issues.  The care coordinator works with the patient and their primary care provider to determine health goals, obtain resources, achieve outcomes, and develop care plans that help coordinate a patient's care.     REFERRAL REASON:   Care Transition: Home care discharge and TCU discharge    Provide additional details for Care Coordination to best meet Patient's current needs: patient moved to live with daughter- patient from [place], [place] now living with daughter in [place]- - needs help transferring care/ insurance issues.     Clinic Staff has discussed Care Coordination Referral with the Patient/Caregiver: yes            NEUROLOGY ADULT REFERRAL       Your provider has referred you for the following:   Consult at [facility]: [facility] [phone]   [email or web address]    Please be aware that coverage of these services is subject to the terms and limitations of your health insurance plan.  Call member services at your health plan with any benefit or coverage questions.      Please bring the following with you to your appointment:    (1) Any X-Rays, CTs or MRIs which have been performed.  Contact the facility where they were done to arrange for  prior to your scheduled appointment.    (2) List of current medications  (3) This  "referral request   (4) Any documents/labs given to you for this referral                  Future tests that were ordered for you today     Open Future Orders        Priority Expected Expires Ordered    Lipid panel reflex to direct LDL Fasting Routine  6/29/2018 5/29/2018    ALT Routine  6/29/2018 5/29/2018            Who to contact     If you have questions or need follow up information about today's clinic visit or your schedule please contact [facility] directly at [phone].  Normal or non-critical lab and imaging results will be communicated to you by MyChart, letter or phone within 4 business days after the clinic has received the results. If you do not hear from us within 7 days, please contact the clinic through MyChart or phone. If you have a critical or abnormal lab result, we will notify you by phone as soon as possible.  Submit refill requests through [employer] or call your pharmacy and they will forward the refill request to us. Please allow 3 business days for your refill to be completed.          Additional Information About Your Visit        Care EveryWhere ID     This is your Care EveryWhere ID. This could be used by other organizations to access your [place] medical records  [id number]        Your Vitals Were     Pulse Temperature Height Pulse Oximetry BMI (Body Mass Index)       132 97.4  F (36.3  C) (Tympanic) 5' 9\" (1.753 m) 91% 23.04 kg/m2        Blood Pressure from Last 3 Encounters:   05/29/18 120/76   10/16/12 108/66   07/25/12 122/72    Weight from Last 3 Encounters:   05/29/18 156 lb (70.8 kg)   10/16/12 171 lb 14.4 oz (78 kg)   07/25/12 168 lb 8 oz (76.4 kg)              We Performed the Following     CARDIOLOGY EVAL ADULT REFERRAL     CARE COORDINATION REFERRAL     NEUROLOGY ADULT REFERRAL          Today's Medication Changes          These changes are accurate as of 5/29/18  1:33 PM.  If you have any questions, ask your nurse or doctor.               Stop taking these " medicines if you haven't already. Please contact your care team if you have questions.     aspirin 81 MG tablet           clopidogrel 75 MG tablet   Commonly known as:  PLAVIX                    Primary Care Provider Office Phone # Fax #    NEO Parada [phone] [address] [facility] [address]        Equal Access to Services     JOSE MARTIN URIARTE : Hadii aad ku hadasho Soomaali, waaxda luqadaha, qaybta kaalmada adeegyada, waxay idiin hayaan adeeg kharash la'kaushal . So [facility] [phone].    ATENCIÓN: Si habla español, tiene a acosta disposición servicios gratuitos de asistencia lingüística. Amari al [phone].    We comply with applicable federal civil rights laws and [place] laws. We do not discriminate on the basis of race, color, national origin, age, disability, sex, sexual orientation, or gender identity.            Thank you!     Thank you for choosing [facility]  for your care. Our goal is always to provide you with excellent care. Hearing back from our patients is one way we can continue to improve our services. Please take a few minutes to complete the written survey that you may receive in the mail after your visit with us. Thank you!             Your Updated Medication List - Protect others around you: Learn how to safely use, store and throw away your medicines at www.disposemymeds.org.          This list is accurate as of 5/29/18  1:33 PM.  Always use your most recent med list.                   Brand Name Dispense Instructions for use Diagnosis    fluticasone-salmeterol 250-50 MCG/DOSE diskus inhaler    ADVAIR DISKUS    3 Inhaler    Inhale 1 puff into the lungs every 12 hours.    COPD with emphysema (H)       ipratropium 17 MCG/ACT Inhaler    ATROVENT HFA    1 Inhaler    Inhale 2 puffs into the lungs every 6 hours.    COPD with emphysema (H)       LIPITOR 40 MG tablet   Generic drug:  atorvastatin     30 tablet    Take 1 tablet by mouth At Bedtime.     Mixed hyperlipidemia       lisinopril-hydrochlorothiazide 20-25 MG per tablet    PRINZIDE/ZESTORETIC    60 tablet    Take 1 tablet by mouth 2 times daily. Needs appt before further refills    Unspecified essential hypertension       metoprolol tartrate 25 MG tablet    LOPRESSOR    180 tablet    Take 1 tablet by mouth 2 times daily.    Old myocardial infarction       nitroGLYcerin 0.4 MG sublingual tablet    NITROSTAT     Place 0.4 mg under the tongue every 5 minutes as needed.        * PROVENTIL (2.5 MG/3ML) 0.083% neb solution   Generic drug:  albuterol      Take 1 ampule by nebulization every 4 hours.        * albuterol 108 (90 Base) MCG/ACT Inhaler    PROAIR HFA/PROVENTIL HFA/VENTOLIN HFA    1 Inhaler    Inhale 2 puffs into the lungs every 4 hours.    COPD with emphysema (H)       * Notice:  This list has 2 medication(s) that are the same as other medications prescribed for you. Read the directions carefully, and ask your doctor or other care provider to review them with you.

## 2018-05-30 ENCOUNTER — MEDICAL CORRESPONDENCE (OUTPATIENT)
Dept: HEALTH INFORMATION MANAGEMENT | Facility: CLINIC | Age: 68
End: 2018-05-30

## 2018-05-30 ENCOUNTER — NURSE TRIAGE (OUTPATIENT)
Dept: NURSING | Facility: CLINIC | Age: 68
End: 2018-05-30

## 2018-05-30 ENCOUNTER — TELEPHONE (OUTPATIENT)
Dept: FAMILY MEDICINE | Facility: CLINIC | Age: 68
End: 2018-05-30

## 2018-05-30 DIAGNOSIS — E78.2 MIXED HYPERLIPIDEMIA: ICD-10-CM

## 2018-05-30 DIAGNOSIS — I25.2 OLD MYOCARDIAL INFARCTION: ICD-10-CM

## 2018-05-30 NOTE — TELEPHONE ENCOUNTER
Selvin, home care nurse for Tariq called about getting Tariq's medications ordered.  There are some that they have enough of for now.  The meds needed ASAP are:  Glipizide 10 mg one tab twice daily  Januvia 100 mg every day  Lisinopril 20 mg every day  Atorvastatin 40 mg every night  Metoprolol succinate 25 mg every day  Omeprazole 20 mg every day  Mucinex 600 mg four times daily  Any question, call Selvin, [phone].  Preferred pharmacy [facility] in [place]  Routed: [id number]  Mervat CONNER RN [facility]

## 2018-05-30 NOTE — TELEPHONE ENCOUNTER
Selvin, home care nurse for Tariq called about getting Tariq's medications ordered.  There are some that they have enough of for now.  The meds needed ASAP are:  Glipizide 10 mg one tab twice daily  Januvia 100 mg every day  Lisinopril 20 mg every day  Atorvastatin 40 mg every night  Metoprolol succinate 25 mg every day  Omeprazole 20 mg every day  Mucinex 600 mg four times daily  Any question, call Selvin, [phone].  Preferred pharmacy [facility] in [place]  Routed: [id number]  Mervat CONNER RN [facility]

## 2018-05-31 ENCOUNTER — PATIENT OUTREACH (OUTPATIENT)
Dept: CARE COORDINATION | Facility: CLINIC | Age: 68
End: 2018-05-31

## 2018-05-31 RX ORDER — ATORVASTATIN CALCIUM 40 MG/1
40 TABLET, FILM COATED ORAL AT BEDTIME
Qty: 90 TABLET | Refills: 3 | Status: SHIPPED | OUTPATIENT
Start: 2018-05-31

## 2018-05-31 RX ORDER — GLIPIZIDE 10 MG/1
10 TABLET ORAL
Qty: 180 TABLET | Refills: 3 | Status: SHIPPED | OUTPATIENT
Start: 2018-05-31

## 2018-05-31 RX ORDER — LISINOPRIL 20 MG/1
20 TABLET ORAL DAILY
Qty: 90 TABLET | Refills: 3 | Status: SHIPPED | OUTPATIENT
Start: 2018-05-31

## 2018-05-31 RX ORDER — METOPROLOL TARTRATE 25 MG/1
25 TABLET, FILM COATED ORAL 2 TIMES DAILY
Qty: 180 TABLET | Refills: 3 | Status: SHIPPED | OUTPATIENT
Start: 2018-05-31

## 2018-05-31 ASSESSMENT — ACTIVITIES OF DAILY LIVING (ADL)
DEPENDENT_IADLS:: CLEANING;COOKING;LAUNDRY;SHOPPING;MEAL PREPARATION;MEDICATION MANAGEMENT;MONEY MANAGEMENT;TRANSPORTATION

## 2018-05-31 NOTE — PROGRESS NOTES
"Clinic Care Coordination Contact  Care Team Conversations    SW received referral from PCP to assist pt with, \"Care Transition: Home care discharge and TCU discharge.  Patient moved to live with daughter- patient from [place], [place] now living with daughter in [place]- - needs help transferring care/ insurance issues.\"    CHLOE placed call today and spoke with pt's daughter, Reba.  SATHYA is being scanned into Epic per chart notes.  Chloe introduced, self, title and role.  Per Reba, she informed this writer that her dad moved into her home after a long hospitalization and TCU stay due to his inability to care for himself.  Reba also shared that she has no concerns at home at this time, as her dad is receiving Home Care services in the form of a RN to set up his medications & oversee his medical needs, PT/OT for his physical rehab and a HHA to assist with his personal care needs.    Albetty also shared that she is pregnant and when she goes into labor and delivery, the pt cannot be left alone.  She has Rhoda from [employer] coming to the home at 12 noon today to work with her on creating a respite plan of care for the pt, whether it be a TCU stay or creating 24/7 cares in the home, that will be determined in the next week or so.  Pt has Medicare and [employer] insurance and Reba voiced no concerns with bills, coverage issues or payments at this time.    CHLOE asked Reba if she is having difficulties with appointment scheduling or transferring cares to the [facility] and her reply was that she has all of the pt's appointments already scheduled as all of the clinics have called her for all appointments.  He is scheduled for Pulmonology (which will cover his questions for the need for oxygen) and cardiology.    CHLOE asked Reba if this writer could mail letter of introduction so that she had this writer's name & contact information for when after Home Care closed and things settled down in their home; she " agreed and welcomed this communication.    SW will not open pt to cares at this time, but will remain available should future needs arise.      Zunilda Tran  Social Work Care Coordinator  [facility]  [phone]

## 2018-05-31 NOTE — LETTER
Health Care Home - Access Care Plan    About Me:  Patient Name:  Tariq Pinzon    YOB: 1950  Age:                        68 year old   [place] MRN:       [id number] Telephone Information:   Home Phone [phone]   Mobile [phone]       Address:  [address] [place] [address] Email address:  No e-mail address on record      Emergency Contact(s)  Name Relationship Lgl Grd Work Phone Home Phone Mobile Phone   1JAMES GARIBAY* Daughter   [phone]             Health Maintenance: Routine Health maintenance Reviewed: Due/Overdue     My Access Plan  Medical Emergency 911   Questions or concerns during clinic hours Primary Clinic Line, I will call the clinic directly: [facility] - [phone]   24 Hour Appointment Line [phone] or  [phone]) (toll free)   24 Hour Nurse Line [phone] (toll free)   Questions or concerns outside clinic hours 24 Hour Appointment Line, I will call the after-hours on-call line:   [facility] [phone] or [phone]) (toll-free)   Preferred Urgent Care [facility], [phone]   Preferred Hospital [place], [place]  [phone]   Preferred Pharmacy [facility] [place] [place] - [facility]     Behavioral Health Crisis Line The National Suicide Prevention Lifeline at 1-295.331.2362 or 911           My Care Team Members  Patient Care Team       Relationship Specialty Notifications Start End    Jordana Rosenberg APRN CNP PCP - General Nurse Practitioner  5/29/18     Phone: [phone] Fax: [phone] [address] [facility] [address]           My Medical and Care Information  Problem List   Patient Active Problem List   Diagnosis     COPD (chronic obstructive pulmonary disease) (H)     Essential hypertension     Old myocardial infarction     Pulmonary nodule, left     Mixed  hyperlipidemia      Current Medications and Allergies:  See printed Medication Report

## 2018-05-31 NOTE — LETTER
[facility] CARE COORDINATION  [address] [place] [address]  [phone]      May 31, 2018    Tariq Pinzon  [address] [facility] [address]  [facility] [address]      Dear Tariq & Reba,    I am a clinic care coordinator who works with NEO Parada CNP at the [facility]. I wanted to thank Reba for spending the time to talk with me today on the phone.  I also wanted to provide you with my contact information so that you can call me with questions or concerns about your health care. Below is a description of clinic care coordination and how I can further assist you.     The clinic care coordinator is a registered nurse and/or  who understand the health care system. The goal of clinic care coordination is to help you manage your health and improve access to the [facility] system in the most efficient manner. The registered nurse can assist you in meeting your health care goals by providing education, coordinating services, and strengthening the communication among your providers. The  can assist you with financial, behavioral, psychosocial, chemical dependency, counseling, and/or psychiatric resources.    Please feel free to contact me at [phone], with any questions or concerns. We at [facility] are focused on providing you with the highest-quality healthcare experience possible and that all starts with you.     Sincerely,     Zunilda Uribe    Enclosed: I have enclosed a copy of a 24 Hour Access Plan. This has helpful phone numbers for you to call when needed. Please keep this in an easy to access place to use as needed.

## 2018-05-31 NOTE — TELEPHONE ENCOUNTER
Attempted to call the patient and no answer and unable to leave message.  Called the daughter and informed her of meds being refilled. Kelli STILL RN

## 2018-05-31 NOTE — TELEPHONE ENCOUNTER
Emily,    Medications are pended for your approval.  The Glipizide, Januvia, and omeprazole are new to us.  Kelli STILL RN

## 2018-06-06 ENCOUNTER — MEDICAL CORRESPONDENCE (OUTPATIENT)
Dept: HEALTH INFORMATION MANAGEMENT | Facility: CLINIC | Age: 68
End: 2018-06-06

## 2018-06-06 ENCOUNTER — PATIENT OUTREACH (OUTPATIENT)
Dept: CARE COORDINATION | Facility: CLINIC | Age: 68
End: 2018-06-06

## 2018-06-06 ENCOUNTER — OFFICE VISIT (OUTPATIENT)
Dept: FAMILY MEDICINE | Facility: CLINIC | Age: 68
End: 2018-06-06
Payer: MEDICARE

## 2018-06-06 VITALS
BODY MASS INDEX: 23.55 KG/M2 | HEIGHT: 69 IN | OXYGEN SATURATION: 94 % | SYSTOLIC BLOOD PRESSURE: 109 MMHG | HEART RATE: 84 BPM | TEMPERATURE: 98.4 F | WEIGHT: 159 LBS | DIASTOLIC BLOOD PRESSURE: 76 MMHG

## 2018-06-06 DIAGNOSIS — J43.9 PULMONARY EMPHYSEMA, UNSPECIFIED EMPHYSEMA TYPE (H): ICD-10-CM

## 2018-06-06 DIAGNOSIS — I10 ESSENTIAL HYPERTENSION: ICD-10-CM

## 2018-06-06 DIAGNOSIS — I25.10 CORONARY ARTERY DISEASE INVOLVING NATIVE HEART WITHOUT ANGINA PECTORIS, UNSPECIFIED VESSEL OR LESION TYPE: ICD-10-CM

## 2018-06-06 DIAGNOSIS — Z86.73 HISTORY OF CVA (CEREBROVASCULAR ACCIDENT): Primary | ICD-10-CM

## 2018-06-06 DIAGNOSIS — G81.94 LEFT HEMIPARESIS (H): ICD-10-CM

## 2018-06-06 DIAGNOSIS — E78.2 MIXED HYPERLIPIDEMIA: ICD-10-CM

## 2018-06-06 DIAGNOSIS — Z79.82 ENCOUNTER FOR LONG-TERM (CURRENT) USE OF ASPIRIN: ICD-10-CM

## 2018-06-06 PROBLEM — F10.11 HISTORY OF ALCOHOL ABUSE: Status: ACTIVE | Noted: 2018-06-06

## 2018-06-06 PROBLEM — Z87.891 PERSONAL HISTORY OF TOBACCO USE, PRESENTING HAZARDS TO HEALTH: Status: ACTIVE | Noted: 2018-06-06

## 2018-06-06 PROCEDURE — G0180 MD CERTIFICATION HHA PATIENT: HCPCS | Performed by: FAMILY MEDICINE

## 2018-06-06 PROCEDURE — 99215 OFFICE O/P EST HI 40 MIN: CPT | Performed by: NURSE PRACTITIONER

## 2018-06-06 RX ORDER — ALBUTEROL SULFATE 90 UG/1
2 AEROSOL, METERED RESPIRATORY (INHALATION) EVERY 4 HOURS
Qty: 1 INHALER | Refills: 2 | Status: SHIPPED | OUTPATIENT
Start: 2018-06-06

## 2018-06-06 RX ORDER — PREDNISONE 5 MG/1
5 TABLET ORAL DAILY
Qty: 30 TABLET | Refills: 11 | Status: SHIPPED | OUTPATIENT
Start: 2018-06-06

## 2018-06-06 NOTE — MR AVS SNAPSHOT
After Visit Summary   6/6/2018    Tariq Pinzon    MRN: [id number]           Patient Information     Date Of Birth          1950        Visit Information        Provider Department      6/6/2018 8:40 AM Jordana Rosenberg APRN CNP [facility]        Today's Diagnoses     Pulmonary emphysema, unspecified emphysema type (H)          Care Instructions    Confirm dose of Metoprolol and Lisinopril    prescriptions           Thank you for choosing [facility].  You may be receiving a survey in the mail from Ariadna Tadeo regarding your visit today.  Please take a few minutes to complete and return the survey to let us know how we are doing.      If you have questions or concerns, please contact us via Teak or you can contact your care team at [phone].    Our Clinic hours are:  Monday 6:40 am  to 7:00 pm  Tuesday -Friday 6:40 am to 5:00 pm    The [place] outpatient lab hours are:  Monday - Friday 6:10 am to 4:45 pm  Saturdays 7:00 am to 11:00 am  Appointments are required, call [phone]    If you have clinical questions after hours or would like to schedule an appointment,  call the clinic at [phone].          Follow-ups after your visit        Your next 10 appointments already scheduled     Jun 21, 2018 11:30 AM CDT   New Visit with Bora Rebolledo MD   [facility] ([facility])    [address] [address] [phone]              Future tests that were ordered for you today     Open Future Orders        Priority Expected Expires Ordered    Oxygen Routine  6/6/2019 6/6/2018            Who to contact     If you have questions or need follow up information about today's clinic visit or your schedule please contact [facility] directly at [phone].  Normal or non-critical lab and imaging results will be communicated to you by MyChart, letter or phone within 4 business days  after the clinic has received the results. If you do not hear from us within 7 days, please contact the clinic through BeneStream or phone. If you have a critical or abnormal lab result, we will notify you by phone as soon as possible.  Submit refill requests through BeneStream or call your pharmacy and they will forward the refill request to us. Please allow 3 business days for your refill to be completed.          Additional Information About Your Visit        Care EveryWhere ID     This is your Care EveryWhere ID. This could be used by other organizations to access your [place] medical records  [id number]        Your Vitals Were     Pulse Temperature Pulse Oximetry BMI (Body Mass Index)          84 98.4  F (36.9  C) (Tympanic) 94% 23.04 kg/m2         Blood Pressure from Last 3 Encounters:   06/06/18 109/76   05/29/18 120/76   10/16/12 108/66    Weight from Last 3 Encounters:   06/06/18 156 lb (70.8 kg)   05/29/18 156 lb (70.8 kg)   10/16/12 171 lb 14.4 oz (78 kg)                 Today's Medication Changes          These changes are accurate as of 6/6/18  9:16 AM.  If you have any questions, ask your nurse or doctor.               Start taking these medicines.        Dose/Directions    predniSONE 5 MG tablet   Commonly known as:  DELTASONE   Used for:  Pulmonary emphysema, unspecified emphysema type (H)   Started by:  Jordana Rosenberg APRN CNP        Dose:  5 mg   Take 1 tablet (5 mg) by mouth daily   Quantity:  30 tablet   Refills:  11         These medicines have changed or have updated prescriptions.        Dose/Directions    fluticasone-salmeterol 250-50 MCG/DOSE diskus inhaler   Commonly known as:  ADVAIR   This may have changed:    - when to take this  - Another medication with the same name was removed. Continue taking this medication, and follow the directions you see here.   Used for:  Pulmonary emphysema, unspecified emphysema type (H)   Changed by:  Jordana Rosenberg APRN CNP        Dose:  1 puff    Inhale 1 puff into the lungs 2 times daily   Quantity:  1 Inhaler   Refills:  5       glipiZIDE 10 MG tablet   Commonly known as:  GLUCOTROL   This may have changed:  Another medication with the same name was removed. Continue taking this medication, and follow the directions you see here.   Used for:  Old myocardial infarction   Changed by:  Jordana Rosenberg APRN CNP        Dose:  10 mg   Take 1 tablet (10 mg) by mouth 2 times daily (before meals)   Quantity:  180 tablet   Refills:  3       sitagliptin 100 MG tablet   Commonly known as:  JANUVIA   This may have changed:  Another medication with the same name was removed. Continue taking this medication, and follow the directions you see here.   Used for:  Old myocardial infarction   Changed by:  Jordana Rosenberg APRN CNP        Dose:  100 mg   Take 1 tablet (100 mg) by mouth daily   Quantity:  90 tablet   Refills:  3         Stop taking these medicines if you haven't already. Please contact your care team if you have questions.     LEVAQUIN PO   Stopped by:  Jordana Rosenberg APRN CNP           lisinopril-hydrochlorothiazide 20-25 MG per tablet   Commonly known as:  PRINZIDE/ZESTORETIC   Stopped by:  Jordana Rosenberg APRN CNP                Where to get your medicines      These medications were sent to [facility] [address]   [address] [facility] [address]     Phone:  [phone]     albuterol 108 (90 Base) MCG/ACT Inhaler    fluticasone-salmeterol 250-50 MCG/DOSE diskus inhaler    predniSONE 5 MG tablet                Primary Care Provider Office Phone # Fax #    NEO Parada [phone] [address] [facility] [address]        Equal Access to Services     JOSE MARTIN URIARTE : Angela Benoit, wajulioda jenny, qaybta kaalmada yong, cassandra quiñones. So [facility] [phone].    ATENCIÓN: Si habla español, tiene a acosta disposición servicios  tacho de asistencia lingüística. Amari harrell [phone].    We comply with applicable federal civil rights laws and [place] laws. We do not discriminate on the basis of race, color, national origin, age, disability, sex, sexual orientation, or gender identity.            Thank you!     Thank you for choosing [facility]  for your care. Our goal is always to provide you with excellent care. Hearing back from our patients is one way we can continue to improve our services. Please take a few minutes to complete the written survey that you may receive in the mail after your visit with us. Thank you!             Your Updated Medication List - Protect others around you: Learn how to safely use, store and throw away your medicines at www.disposemymeds.org.          This list is accurate as of 6/6/18  9:16 AM.  Always use your most recent med list.                   Brand Name Dispense Instructions for use Diagnosis    aspirin 325 MG tablet      325 mg        atorvastatin 40 MG tablet    LIPITOR    90 tablet    Take 1 tablet (40 mg) by mouth At Bedtime    Mixed hyperlipidemia       fluticasone-salmeterol 250-50 MCG/DOSE diskus inhaler    ADVAIR    1 Inhaler    Inhale 1 puff into the lungs 2 times daily    Pulmonary emphysema, unspecified emphysema type (H)       glipiZIDE 10 MG tablet    GLUCOTROL    180 tablet    Take 1 tablet (10 mg) by mouth 2 times daily (before meals)    Old myocardial infarction       ipratropium 17 MCG/ACT Inhaler    ATROVENT HFA    1 Inhaler    Inhale 2 puffs into the lungs every 6 hours.    COPD with emphysema (H)       lisinopril 20 MG tablet    PRINIVIL/ZESTRIL    90 tablet    Take 1 tablet (20 mg) by mouth daily    Old myocardial infarction       metoprolol tartrate 25 MG tablet    LOPRESSOR    180 tablet    Take 1 tablet (25 mg) by mouth 2 times daily    Old myocardial infarction       nitroGLYcerin 0.4 MG sublingual tablet    NITROSTAT     Place 0.4 mg under the tongue every  5 minutes as needed.        omeprazole 20 MG CR capsule    priLOSEC    90 capsule    Take 1 capsule (20 mg) by mouth daily    Old myocardial infarction       predniSONE 5 MG tablet    DELTASONE    30 tablet    Take 1 tablet (5 mg) by mouth daily    Pulmonary emphysema, unspecified emphysema type (H)       * PROVENTIL (2.5 MG/3ML) 0.083% neb solution   Generic drug:  albuterol      Take 1 ampule by nebulization every 4 hours.        * albuterol 108 (90 Base) MCG/ACT Inhaler    PROAIR HFA/PROVENTIL HFA/VENTOLIN HFA    1 Inhaler    Inhale 2 puffs into the lungs every 4 hours    Pulmonary emphysema, unspecified emphysema type (H)       sitagliptin 100 MG tablet    JANUVIA    90 tablet    Take 1 tablet (100 mg) by mouth daily    Old myocardial infarction       TYLENOL PO      Take 325 mg by mouth 325 in the am, 650mg po prn for pain        * Notice:  This list has 2 medication(s) that are the same as other medications prescribed for you. Read the directions carefully, and ask your doctor or other care provider to review them with you.

## 2018-06-06 NOTE — NURSING NOTE
"Initial /76 (BP Location: Right arm, Patient Position: Chair, Cuff Size: Adult Regular)  Pulse 84  Temp 98.4  F (36.9  C) (Tympanic)  Ht 5' 9\" (1.753 m)  Wt 159 lb (72.1 kg)  SpO2 94%  BMI 23.48 kg/m2 Estimated body mass index is 23.48 kg/(m^2) as calculated from the following:    Height as of this encounter: 5' 9\" (1.753 m).    Weight as of this encounter: 159 lb (72.1 kg). .    Rhiannon Gonzales    "

## 2018-06-06 NOTE — NURSING NOTE
"Initial /76 (BP Location: Right arm, Patient Position: Chair, Cuff Size: Adult Regular)  Pulse 84  Temp 98.4  F (36.9  C) (Tympanic)  Wt 156 lb (70.8 kg)  SpO2 94%  BMI 23.04 kg/m2 Estimated body mass index is 23.04 kg/(m^2) as calculated from the following:    Height as of 5/29/18: 5' 9\" (1.753 m).    Weight as of this encounter: 156 lb (70.8 kg). .    Rhiannon Gonzales    "

## 2018-06-06 NOTE — PROGRESS NOTES
SUBJECTIVE:   Tariq Pinzon is a 68 year old male who presents to clinic today for the following health issues:    Patient with previous history of Non ST elevation MI, 2-vessel CAD requiring drug-eluting stent to RCA and left circumflex artery, severe COPD- requiring 2-3 liters continuous oxygen, CVA, eft sided hemiparesis, hypertension, HLD, chronic steroid use for COPD, diabetes mellitus II.     Patient recently was discharge from [facility] in [place] after suffering CVA- discharge was 5/28/18 - patient was discharge to live with his daughter and her family receiving home health care twice a week. Patient would like to go back to [facility] to help with his ADL's/PT and OT. Patient also feels like his left sided weakness is worsening since he returned home. He also feels like he needs help with his ADL's- daughter and family do not feel equipped to help patient- daughter is 8 month pregnant and unable to do any heavy lifting.     Medications were reviewed with patient and daughter and updated in chart.       -------------------------------------    Problem list and histories reviewed & adjusted, as indicated.  Additional history: as documented    Patient Active Problem List   Diagnosis     COPD (chronic obstructive pulmonary disease) (H)     Essential hypertension     Old myocardial infarction     Pulmonary nodule, left     Mixed hyperlipidemia     History of CVA (cerebrovascular accident)     Left hemiparesis (H)     Coronary artery disease involving native heart without angina pectoris, unspecified vessel or lesion type     History of alcohol abuse     Personal history of tobacco use, presenting hazards to health     Past Surgical History:   Procedure Laterality Date     NO HISTORY OF SURGERY         Social History   Substance Use Topics     Smoking status: Former Smoker     Packs/day: 2.00     Years: 38.00     Smokeless tobacco: Former User     Quit date: 1/1/2007       "Comment: Quit several years back     Alcohol use No     Family History   Problem Relation Age of Onset     Asthma Mother      Asthma Maternal Grandmother      Respiratory Father      Lung Cancer     Respiratory Daughter      Asthma in all 4 daughter     DIABETES Mother      DIABETES Daughter            Reviewed and updated as needed this visit by clinical staff       Reviewed and updated as needed this visit by Provider         ROS:  Constitutional, HEENT, cardiovascular, pulmonary, GI, , musculoskeletal, neuro, skin, endocrine and psych systems are negative, except as otherwise noted.    OBJECTIVE:     /76 (BP Location: Right arm, Patient Position: Chair, Cuff Size: Adult Regular)  Pulse 84  Temp 98.4  F (36.9  C) (Tympanic)  Ht 5' 9\" (1.753 m)  Wt 159 lb (72.1 kg)  SpO2 94%  BMI 23.48 kg/m2  Body mass index is 23.48 kg/(m^2).  GENERAL: healthy, alert and no distress- seated in wheelchair.   EYES: Eyes grossly normal to inspection, PERRL and conjunctivae and sclerae normal  HENT: ear canals and TM's normal, nose and mouth without ulcers or lesions  NECK: no adenopathy, no asymmetry, masses, or scars and thyroid normal to palpation  RESP: lungs clear to auscultation - no rales, rhonchi or wheezes  CV: regular rate and rhythm, normal S1 S2, no S3 or S4, no murmur, click or rub, no peripheral edema and peripheral pulses strong  ABDOMEN: soft, nontender, no hepatosplenomegaly, no masses and bowel sounds normal  MS: left sided hemiparesis - requires assistance X 2 to stand  SKIN: no suspicious lesions or rashes  NEURO: Normal strength and tone, mentation intact and speech normal  PSYCH: mentation appears normal, affect normal/bright    Diagnostic Test Results:  none     ASSESSMENT/PLAN:         1. History of CVA (cerebrovascular accident)  Patient recently suffered CVA 3/2018 and was recently discharge from [facility] 5/28/18-   Remains on  mg     2. Left hemiparesis (H)  Patient feels " like since being home his strength is worsening on left side   Recommend physical therapy/OT     3. Pulmonary emphysema, unspecified emphysema type (H)  - continue oxygen therapy 2-3 liters to maintain oxygen > 93%.  - patient has appointment with Pulmonary at [facility]   - albuterol (PROAIR HFA/PROVENTIL HFA/VENTOLIN HFA) 108 (90 Base) MCG/ACT Inhaler; Inhale 2 puffs into the lungs every 4 hours  Dispense: 1 Inhaler; Refill: 2  - predniSONE (DELTASONE) 5 MG tablet; Take 1 tablet (5 mg) by mouth daily  Dispense: 30 tablet; Refill: 11  - fluticasone-salmeterol (ADVAIR) 250-50 MCG/DOSE diskus inhaler; Inhale 1 puff into the lungs 2 times daily  Dispense: 1 Inhaler; Refill: 5    4. Mixed hyperlipidemia  Tolerating statin     5. Essential hypertension  Controlled on current medications    6. Coronary artery disease involving native heart without angina pectoris, unspecified vessel or lesion type  Followed by Cardiology  Tolerating statin and Metoprolol   Remains on  mg     7. Encounter for long-term use of ASA  - continue  mg       Admission orders written and sent to [facility] for ongoing cares with ADL's, physical therapy/OT therapy .    > 45 min spent in direct face to face time with this patient, greater than 50% in counseling and coordination of care discussing further care due to diabetes mellitus, COPD, CVA, CAD, hemiparesis - [facility]        NEO Parada [facility]

## 2018-06-06 NOTE — LETTER
[facility]  Complex Care Plan  About Me  Patient Name:  Tariq Pinzon    YOB: 1950  Age:   68 year old   [place] MRN: [id number] Telephone Information:  Home Phone [phone]   Mobile [phone]       Address:    [address] [place] [address] Email address:  No e-mail address on record      Emergency Contact(s)  Name Relationship Lgl Grd Work Phone Home Phone Mobile Phone   1ANA M GARIBAYYC* Daughter   [phone]           Primary language:  English     needed? No   [employer]:  [phone] op. 1  Other communication barriers:    Preferred Method of Communication:  Do Not Contact  Current living arrangement: I live in a private home with daughter and family.    Mobility Status/ Medical Equipment: Dependent/Assisted by Another    Health Maintenance  Health Maintenance Reviewed:  Yes    My Access Plan  Medical Emergency 911   Primary Clinic Line [facility] - [phone]   24 Hour Appointment Line [phone] or  [phone]) (toll-free)   24 Hour Nurse Line [phone] (toll-free)   Preferred Urgent Care [facility], [phone]   Preferred Hospital [place], [place]  [phone]   Preferred Pharmacy [facility] [place], [place] - [facility]     Behavioral Health Crisis Line The National Suicide Prevention Lifeline at 1-206.695.8402 or 911     My Care Team Members    Patient Care Team       Relationship Specialty Notifications Start End    ChetJordana APRN CNP PCP - General Nurse Practitioner  5/29/18     Phone: [phone] Fax: [phone] [address] [facility] [address]    Zunilda Uribe LSW Lead Care Coordinator Primary Care - CC Admissions 6/6/18     Phone: [phone] Fax: [phone]                My Care Plans  Self Management and Treatment Plan, Goals and (Comments)  Goals         General    Financial Wellbeing (pt-stated)     Notes - Note created  6/6/2018  2:45 PM by Zunilda Uribe LSW    Goal Statement: I would like to learn about financial benefits available to my father.  Measure of Success: Pt will increase his financial status  Supportive Steps to Achieve: SW and pt's daughter to discuss options available to the pt  Barriers: Pt has been independent with all cares previously and very self sufficient   Strengths: Reba is motivated to assist her father with his care needs and financial status  Date to Achieve By: 6 months               Action Plans on File: None  Advance Care Plans/Directives Type:        My Medical and Care Information  Problem List   Patient Active Problem List   Diagnosis     COPD (chronic obstructive pulmonary disease) (H)     Essential hypertension     Old myocardial infarction     Pulmonary nodule, left     Mixed hyperlipidemia     History of CVA (cerebrovascular accident)     Left hemiparesis (H)     Coronary artery disease involving native heart without angina pectoris, unspecified vessel or lesion type     History of alcohol abuse     Personal history of tobacco use, presenting hazards to health     Encounter for long-term (current) use of aspirin      Current Medications and Allergies:  See printed Medication Report.    Care Coordination Start Date: 6/6/2018   Frequency of Care Coordination: monthly   Form Last Updated: 06/06/2018

## 2018-06-06 NOTE — PATIENT INSTRUCTIONS
Confirm dose of Metoprolol and Lisinopril    prescriptions           Thank you for choosing [facility].  You may be receiving a survey in the mail from [employer] regarding your visit today.  Please take a few minutes to complete and return the survey to let us know how we are doing.      If you have questions or concerns, please contact us via TasteBook or you can contact your care team at [phone].    Our Clinic hours are:  Monday 6:40 am  to 7:00 pm  Tuesday -Friday 6:40 am to 5:00 pm    The [place] outpatient lab hours are:  Monday - Friday 6:10 am to 4:45 pm  Saturdays 7:00 am to 11:00 am  Appointments are required, call [phone]    If you have clinical questions after hours or would like to schedule an appointment,  call the clinic at [phone].

## 2018-06-06 NOTE — LETTER
Health Care Home - Access Care Plan    About Me:  Patient Name:  Tariq Pinzon    YOB: 1950  Age:                           68 year old   Golden MRN:          [id number] Telephone Information:   Home Phone [phone]   Mobile [phone]       Address:  [address] [place] [address] Email address:  No e-mail address on record      Emergency Contact(s)  Name Relationship Lgl Grd Work Phone Home Phone Mobile Phone   1ANA M GARIBAYYC* Daughter   [phone]             Health Maintenance:  Due/Overdue, will discuss with PCP    My Access Plan  Medical Emergency 911   Questions or concerns during clinic hours Primary Clinic Line, I will call the clinic directly: [facility] - [phone]   24 Hour Appointment Line [phone] or  [phone]) (toll free)   24 Hour Nurse Line [phone] (toll free)   Questions or concerns outside clinic hours 24 Hour Appointment Line, I will call the after-hours on-call line:   [facility] [phone] or [phone]) (toll-free)   Preferred Urgent Care [facility], [phone]   Preferred Hospital [place], [place]  [phone]   Preferred Pharmacy [facility] [place], [place] - [address] [facility]     Behavioral Health Crisis Line The National Suicide Prevention Lifeline at 1-465.479.4966 or 911             My Care Team Members  Patient Care Team       Relationship Specialty Notifications Start End    Jordana Rosenberg APRN CNP PCP - General Nurse Practitioner  5/29/18     Phone: [phone] Fax: [phone] [address] [facility] [address]    Zunilda Uribe LSW Lead Care Coordinator Primary Care - [facility] Admissions 6/6/18     Phone: [phone] Fax: [phone]               My Medical and Care Information  Problem List   Patient Active Problem List   Diagnosis     COPD (chronic  obstructive pulmonary disease) (H)     Essential hypertension     Old myocardial infarction     Pulmonary nodule, left     Mixed hyperlipidemia     History of CVA (cerebrovascular accident)     Left hemiparesis (H)     Coronary artery disease involving native heart without angina pectoris, unspecified vessel or lesion type     History of alcohol abuse     Personal history of tobacco use, presenting hazards to health     Encounter for long-term (current) use of aspirin      Current Medications and Allergies:  See printed Medication Report

## 2018-06-06 NOTE — NURSING NOTE
"Initial /76 (BP Location: Right arm, Patient Position: Chair, Cuff Size: Adult Regular)  Pulse 84  Temp 98.4  F (36.9  C) (Tympanic)  Ht 5' 9\" (1.753 m)  Wt 159 lb 6.4 oz (72.3 kg)  SpO2 94%  BMI 23.54 kg/m2 Estimated body mass index is 23.54 kg/(m^2) as calculated from the following:    Height as of this encounter: 5' 9\" (1.753 m).    Weight as of this encounter: 159 lb 6.4 oz (72.3 kg). .    Rhiannon Gonzales    "

## 2018-06-06 NOTE — LETTER
[facility]  [address] [place] [address]  [phone]    June 6, 2018    Mandeep Pinzon  [address] [facility] [address]  [facility] [address]      Dear Tariq Britton    I am a clinic care coordinator who works with NEO Parada CNP at the [facility] & I wanted to thank you for spending the time to talk with me today regarding getting Tariq back to [facility].    Reba, when you have a moment, I would love to chat about your dad's insurance & to see if he is eligible for medical assistance or other community resources that will best help him manage his health and improve access to the [facility] system in the most efficient manner. The registered nurse can assist you in meeting your health care goals by providing education, coordinating services, and strengthening the communication among your providers. The  can assist you with financial, behavioral, psychosocial, chemical dependency, counseling, and/or psychiatric resources.    Please feel free to contact me at [phone], with any questions or concerns. We at [facility] are focused on providing you with the highest-quality healthcare experience possible and that all starts with you.     Sincerely,     Zunilda Uribe    Enclosed: I have enclosed a copy of the Complex Care Plan. This has helpful information and goals that we have talked about. Please keep this in an easy to access place to use as needed.

## 2018-06-06 NOTE — PROGRESS NOTES
Clinic Care Coordination Contact  Care Team Conversations    SW received phone call from PCP asking for assistance and guidance for TCU placement.  Pt was in the clinic for an appointment and was asking for TCU admission orders to be written for [facility] as that is where the pt had been getting his rehab prior to discharging to his daughter's home on 5-23-18.  SW informed the PCP that this writer is able to assist with this admission and will send her the paperwork that needs to be completed by her.    SW placed call to [facility], [phone], spoke with Lety and learned that the pt is welcome back there once an H&P, admission orders and a PASS-RR is completed.  SW had the PCP complete the H&P and admission orders.  This writer completed the PASS-RR and also called and spoke with pt's daughter, Reba, who shared that because she is almost 8 weeks pregnant, she is unable to lift/transfer the pt on her own.  She states that her dad is loosing the progress he had made while at TCU due to her inability to work with him daily and would like him back in the TCU.    Reba is able to transport him at 6PM tonight when her  is home and she also explained that she is at an appointment with her son, so it is difficult to speak now, when this writer tried to discuss medical assistance application, etc.    PAS-RR  D: Per DHS regulation, CHLOE completed and submitted PAS-RR to [place] [employer] Direct Connect via the Senior LinkAge Line.  PAS-RR confirmation # is : [id number]  I: CHLOE spoke with Reba and she is  aware a PAS-RR has been submitted.  CHLOE reviewed with Reba that they may be contacted for a follow up appointment within 10 days of hospital discharge if their SNF stay is < 30 days.  Contact information for [facility] LinkAge Line was also provided.  A: Reba verbalized understanding.  P: Further questions may be directed to [facility] LinkAge Line at #[phone],  option #4 for PAS-RR staff.    SW to continue to follow for clinic care coordination and financial assistance.    Zunilda Hennessy [facility]  Social Work Care Coordinator  [facility]  [phone]

## 2018-06-07 ENCOUNTER — AMBULATORY - HEALTHEAST (OUTPATIENT)
Dept: ADMINISTRATIVE | Facility: CLINIC | Age: 68
End: 2018-06-07

## 2018-06-07 ENCOUNTER — TELEPHONE (OUTPATIENT)
Dept: FAMILY MEDICINE | Facility: CLINIC | Age: 68
End: 2018-06-07

## 2018-06-07 NOTE — TELEPHONE ENCOUNTER
Forms from [facility] Cert and Plan of Care, PT Evaluation, Pt Admission Summary. Orders were signed, faxed and sent to scanning.    [facility]

## 2018-06-08 ENCOUNTER — OFFICE VISIT - HEALTHEAST (OUTPATIENT)
Dept: GERIATRICS | Facility: CLINIC | Age: 68
End: 2018-06-08

## 2018-06-08 DIAGNOSIS — E11.9 DIABETES MELLITUS (H): ICD-10-CM

## 2018-06-08 DIAGNOSIS — G81.94 LEFT HEMIPARESIS (H): ICD-10-CM

## 2018-06-08 DIAGNOSIS — R13.10 DYSPHAGIA: ICD-10-CM

## 2018-06-08 DIAGNOSIS — I63.9 CVA (CEREBRAL VASCULAR ACCIDENT) (H): ICD-10-CM

## 2018-06-08 DIAGNOSIS — I10 HYPERTENSION: ICD-10-CM

## 2018-06-08 DIAGNOSIS — J44.9 COPD (CHRONIC OBSTRUCTIVE PULMONARY DISEASE) (H): ICD-10-CM

## 2018-06-10 ENCOUNTER — MEDICAL CORRESPONDENCE (OUTPATIENT)
Dept: HEALTH INFORMATION MANAGEMENT | Facility: CLINIC | Age: 68
End: 2018-06-10

## 2018-06-11 ENCOUNTER — TELEPHONE (OUTPATIENT)
Dept: FAMILY MEDICINE | Facility: CLINIC | Age: 68
End: 2018-06-11

## 2018-06-12 ENCOUNTER — OFFICE VISIT - HEALTHEAST (OUTPATIENT)
Dept: GERIATRICS | Facility: CLINIC | Age: 68
End: 2018-06-12

## 2018-06-12 DIAGNOSIS — I63.9 CVA (CEREBRAL VASCULAR ACCIDENT) (H): ICD-10-CM

## 2018-06-12 DIAGNOSIS — G81.94 LEFT HEMIPARESIS (H): ICD-10-CM

## 2018-06-12 DIAGNOSIS — E11.9 DIABETES MELLITUS (H): ICD-10-CM

## 2018-06-12 DIAGNOSIS — F48.2 PSEUDOBULBAR AFFECT: ICD-10-CM

## 2018-06-12 DIAGNOSIS — J44.9 COPD (CHRONIC OBSTRUCTIVE PULMONARY DISEASE) (H): ICD-10-CM

## 2018-06-14 ENCOUNTER — OFFICE VISIT - HEALTHEAST (OUTPATIENT)
Dept: GERIATRICS | Facility: CLINIC | Age: 68
End: 2018-06-14

## 2018-06-14 DIAGNOSIS — G81.94 LEFT HEMIPARESIS (H): ICD-10-CM

## 2018-06-14 DIAGNOSIS — I63.9 CVA (CEREBRAL VASCULAR ACCIDENT) (H): ICD-10-CM

## 2018-06-14 DIAGNOSIS — J44.9 COPD (CHRONIC OBSTRUCTIVE PULMONARY DISEASE) (H): ICD-10-CM

## 2018-06-14 DIAGNOSIS — F48.2 PSEUDOBULBAR AFFECT: ICD-10-CM

## 2018-06-14 DIAGNOSIS — E11.9 DIABETES MELLITUS (H): ICD-10-CM

## 2018-06-18 ENCOUNTER — OFFICE VISIT - HEALTHEAST (OUTPATIENT)
Dept: GERIATRICS | Facility: CLINIC | Age: 68
End: 2018-06-18

## 2018-06-18 DIAGNOSIS — I63.9 CVA (CEREBRAL VASCULAR ACCIDENT) (H): ICD-10-CM

## 2018-06-18 DIAGNOSIS — E11.9 DIABETES MELLITUS (H): ICD-10-CM

## 2018-06-18 DIAGNOSIS — G81.94 LEFT HEMIPARESIS (H): ICD-10-CM

## 2018-07-18 ENCOUNTER — OFFICE VISIT - HEALTHEAST (OUTPATIENT)
Dept: GERIATRICS | Facility: CLINIC | Age: 68
End: 2018-07-18

## 2018-07-18 DIAGNOSIS — I63.9 CVA (CEREBRAL VASCULAR ACCIDENT) (H): ICD-10-CM

## 2018-07-18 DIAGNOSIS — E11.9 DIABETES MELLITUS (H): ICD-10-CM

## 2018-07-18 DIAGNOSIS — J44.9 COPD (CHRONIC OBSTRUCTIVE PULMONARY DISEASE) (H): ICD-10-CM

## 2018-07-23 ENCOUNTER — OFFICE VISIT - HEALTHEAST (OUTPATIENT)
Dept: GERIATRICS | Facility: CLINIC | Age: 68
End: 2018-07-23

## 2018-07-23 DIAGNOSIS — I63.9 CVA (CEREBRAL VASCULAR ACCIDENT) (H): ICD-10-CM

## 2018-07-23 DIAGNOSIS — J44.9 COPD (CHRONIC OBSTRUCTIVE PULMONARY DISEASE) (H): ICD-10-CM

## 2018-07-23 DIAGNOSIS — E11.9 DIABETES MELLITUS (H): ICD-10-CM

## 2018-07-23 DIAGNOSIS — G81.94 LEFT HEMIPARESIS (H): ICD-10-CM

## 2018-07-26 PROBLEM — E11.9 TYPE 2 DIABETES MELLITUS WITHOUT COMPLICATION, WITHOUT LONG-TERM CURRENT USE OF INSULIN (H): Status: ACTIVE | Noted: 2018-07-26

## 2018-08-13 ENCOUNTER — OFFICE VISIT - HEALTHEAST (OUTPATIENT)
Dept: GERIATRICS | Facility: CLINIC | Age: 68
End: 2018-08-13

## 2018-08-13 DIAGNOSIS — J44.9 COPD (CHRONIC OBSTRUCTIVE PULMONARY DISEASE) (H): ICD-10-CM

## 2018-08-13 DIAGNOSIS — E11.9 DIABETES MELLITUS (H): ICD-10-CM

## 2018-08-14 ENCOUNTER — OFFICE VISIT - HEALTHEAST (OUTPATIENT)
Dept: GERIATRICS | Facility: CLINIC | Age: 68
End: 2018-08-14

## 2018-08-14 DIAGNOSIS — J44.9 COPD (CHRONIC OBSTRUCTIVE PULMONARY DISEASE) (H): ICD-10-CM

## 2018-08-14 DIAGNOSIS — R13.10 DYSPHAGIA: ICD-10-CM

## 2018-08-14 DIAGNOSIS — E11.9 DIABETES MELLITUS (H): ICD-10-CM

## 2018-08-14 DIAGNOSIS — G81.94 LEFT HEMIPARESIS (H): ICD-10-CM

## 2018-09-12 ENCOUNTER — OFFICE VISIT - HEALTHEAST (OUTPATIENT)
Dept: GERIATRICS | Facility: CLINIC | Age: 68
End: 2018-09-12

## 2018-09-12 DIAGNOSIS — E11.9 DIABETES MELLITUS (H): ICD-10-CM

## 2018-09-12 DIAGNOSIS — J44.9 COPD (CHRONIC OBSTRUCTIVE PULMONARY DISEASE) (H): ICD-10-CM

## 2018-09-12 DIAGNOSIS — F48.2 PSEUDOBULBAR AFFECT: ICD-10-CM

## 2018-09-12 DIAGNOSIS — I63.9 CVA (CEREBRAL VASCULAR ACCIDENT) (H): ICD-10-CM

## 2018-09-12 DIAGNOSIS — G81.94 LEFT HEMIPARESIS (H): ICD-10-CM

## 2018-09-14 ENCOUNTER — PATIENT OUTREACH (OUTPATIENT)
Dept: CARE COORDINATION | Facility: CLINIC | Age: 68
End: 2018-09-14

## 2018-09-14 NOTE — PROGRESS NOTES
Clinic Care Coordination Contact 9/14/18  [facility]/Voicemail-    Clinical Data: Care Coordinator Outreach  Outreach attempted x 1.  Left message on pt's daughter, Reba's voicemail, with call back information and requested return call.  Plan:  Care Coordinator will try to reach patient again in 7-10 business days.    BECCA Martinez who is covering for Zunilda Tran. [facility]  Care Coordinator Social Work    [facility]  [phone]  9/14/2018 1:31 PM

## 2018-09-25 ENCOUNTER — OFFICE VISIT - HEALTHEAST (OUTPATIENT)
Dept: GERIATRICS | Facility: CLINIC | Age: 68
End: 2018-09-25

## 2018-09-25 DIAGNOSIS — R09.89 CHOKING EPISODE: ICD-10-CM

## 2018-09-25 DIAGNOSIS — I69.921 DYSPHASIA DUE TO CEREBROVASCULAR DISEASE: ICD-10-CM

## 2018-09-25 DIAGNOSIS — I63.9 CVA (CEREBRAL VASCULAR ACCIDENT) (H): ICD-10-CM

## 2018-09-28 NOTE — PROGRESS NOTES
Clinic Care Coordination Contact 9/28/18  Care Team Conversations-SW    SW made phone call to pt's daughter, Reba, who states the pt is back in the nursing home and is in the LTC portion.    I encouraged her to call CC should she have questions in the future as she was undecided if the pt would be coming back to her home or not.     No CC needs at this time.    Sasha Appiah, [facility]  Care Coordinator Social Work    [facility]  [phone]  9/28/2018 4:15 PM

## 2018-10-10 ENCOUNTER — OFFICE VISIT - HEALTHEAST (OUTPATIENT)
Dept: GERIATRICS | Facility: CLINIC | Age: 68
End: 2018-10-10

## 2018-10-10 DIAGNOSIS — I69.921 DYSPHASIA DUE TO CEREBROVASCULAR DISEASE: ICD-10-CM

## 2018-10-10 DIAGNOSIS — E11.9 DIABETES MELLITUS (H): ICD-10-CM

## 2018-10-10 DIAGNOSIS — I63.9 CVA (CEREBRAL VASCULAR ACCIDENT) (H): ICD-10-CM

## 2018-10-10 DIAGNOSIS — J44.9 COPD (CHRONIC OBSTRUCTIVE PULMONARY DISEASE) (H): ICD-10-CM

## 2018-10-10 DIAGNOSIS — F48.2 PSEUDOBULBAR AFFECT: ICD-10-CM

## 2018-10-17 ENCOUNTER — OFFICE VISIT - HEALTHEAST (OUTPATIENT)
Dept: GERIATRICS | Facility: CLINIC | Age: 68
End: 2018-10-17

## 2018-10-17 DIAGNOSIS — J44.9 COPD (CHRONIC OBSTRUCTIVE PULMONARY DISEASE) (H): ICD-10-CM

## 2018-10-17 DIAGNOSIS — E11.9 DIABETES MELLITUS (H): ICD-10-CM

## 2018-10-17 DIAGNOSIS — G81.94 LEFT HEMIPARESIS (H): ICD-10-CM

## 2018-10-17 DIAGNOSIS — I69.921 DYSPHASIA DUE TO CEREBROVASCULAR DISEASE: ICD-10-CM

## 2018-10-17 DIAGNOSIS — I63.9 CVA (CEREBRAL VASCULAR ACCIDENT) (H): ICD-10-CM

## 2018-10-19 ENCOUNTER — TELEPHONE (OUTPATIENT)
Dept: FAMILY MEDICINE | Facility: CLINIC | Age: 68
End: 2018-10-19

## 2018-10-19 NOTE — TELEPHONE ENCOUNTER
We sent this patient an overdue lab letter on 8/10/18. If they need these results please contact the patient and place new orders.  Thank you  Op lab

## 2018-12-12 ENCOUNTER — OFFICE VISIT - HEALTHEAST (OUTPATIENT)
Dept: GERIATRICS | Facility: CLINIC | Age: 68
End: 2018-12-12

## 2018-12-12 DIAGNOSIS — I69.921 DYSPHASIA DUE TO CEREBROVASCULAR DISEASE: ICD-10-CM

## 2018-12-12 DIAGNOSIS — F48.2 PSEUDOBULBAR AFFECT: ICD-10-CM

## 2018-12-12 DIAGNOSIS — G81.94 LEFT HEMIPARESIS (H): ICD-10-CM

## 2018-12-12 DIAGNOSIS — E11.8 TYPE 2 DIABETES MELLITUS WITH COMPLICATION, WITHOUT LONG-TERM CURRENT USE OF INSULIN (H): ICD-10-CM

## 2018-12-12 DIAGNOSIS — I10 ESSENTIAL HYPERTENSION: ICD-10-CM

## 2018-12-12 DIAGNOSIS — I63.9 CEREBROVASCULAR ACCIDENT (CVA), UNSPECIFIED MECHANISM (H): ICD-10-CM

## 2018-12-12 DIAGNOSIS — J44.9 CHRONIC OBSTRUCTIVE PULMONARY DISEASE, UNSPECIFIED COPD TYPE (H): ICD-10-CM

## 2019-01-03 ENCOUNTER — OFFICE VISIT - HEALTHEAST (OUTPATIENT)
Dept: GERIATRICS | Facility: CLINIC | Age: 69
End: 2019-01-03

## 2019-01-03 DIAGNOSIS — J44.0 CHRONIC OBSTRUCTIVE PULMONARY DISEASE WITH ACUTE LOWER RESPIRATORY INFECTION (H): ICD-10-CM

## 2019-01-03 DIAGNOSIS — I63.9 CEREBROVASCULAR ACCIDENT (CVA), UNSPECIFIED MECHANISM (H): ICD-10-CM

## 2019-01-03 DIAGNOSIS — G81.94 LEFT HEMIPARESIS (H): ICD-10-CM

## 2019-01-07 ENCOUNTER — OFFICE VISIT - HEALTHEAST (OUTPATIENT)
Dept: GERIATRICS | Facility: CLINIC | Age: 69
End: 2019-01-07

## 2019-01-07 DIAGNOSIS — J44.0 CHRONIC OBSTRUCTIVE PULMONARY DISEASE WITH ACUTE LOWER RESPIRATORY INFECTION (H): ICD-10-CM

## 2019-01-14 ENCOUNTER — OFFICE VISIT - HEALTHEAST (OUTPATIENT)
Dept: GERIATRICS | Facility: CLINIC | Age: 69
End: 2019-01-14

## 2019-01-14 DIAGNOSIS — J44.0 CHRONIC OBSTRUCTIVE PULMONARY DISEASE WITH ACUTE LOWER RESPIRATORY INFECTION (H): ICD-10-CM

## 2019-01-16 ENCOUNTER — OFFICE VISIT - HEALTHEAST (OUTPATIENT)
Dept: GERIATRICS | Facility: CLINIC | Age: 69
End: 2019-01-16

## 2019-01-16 DIAGNOSIS — J44.0 CHRONIC OBSTRUCTIVE PULMONARY DISEASE WITH ACUTE LOWER RESPIRATORY INFECTION (H): ICD-10-CM

## 2019-01-21 ENCOUNTER — OFFICE VISIT - HEALTHEAST (OUTPATIENT)
Dept: GERIATRICS | Facility: CLINIC | Age: 69
End: 2019-01-21

## 2019-01-21 DIAGNOSIS — E11.8 TYPE 2 DIABETES MELLITUS WITH COMPLICATION, WITHOUT LONG-TERM CURRENT USE OF INSULIN (H): ICD-10-CM

## 2019-01-21 DIAGNOSIS — J44.0 CHRONIC OBSTRUCTIVE PULMONARY DISEASE WITH ACUTE LOWER RESPIRATORY INFECTION (H): ICD-10-CM

## 2019-01-21 DIAGNOSIS — I63.9 CEREBROVASCULAR ACCIDENT (CVA), UNSPECIFIED MECHANISM (H): ICD-10-CM

## 2019-02-13 ENCOUNTER — OFFICE VISIT - HEALTHEAST (OUTPATIENT)
Dept: GERIATRICS | Facility: CLINIC | Age: 69
End: 2019-02-13

## 2019-02-13 DIAGNOSIS — J44.0 CHRONIC OBSTRUCTIVE PULMONARY DISEASE WITH ACUTE LOWER RESPIRATORY INFECTION (H): ICD-10-CM

## 2019-02-18 ENCOUNTER — OFFICE VISIT - HEALTHEAST (OUTPATIENT)
Dept: GERIATRICS | Facility: CLINIC | Age: 69
End: 2019-02-18

## 2019-02-18 DIAGNOSIS — J44.0 CHRONIC OBSTRUCTIVE PULMONARY DISEASE WITH ACUTE LOWER RESPIRATORY INFECTION (H): ICD-10-CM

## 2019-02-18 DIAGNOSIS — L98.9 SKIN LESION OF RIGHT LEG: ICD-10-CM

## 2019-03-14 ENCOUNTER — RECORDS - HEALTHEAST (OUTPATIENT)
Dept: ADMINISTRATIVE | Facility: OTHER | Age: 69
End: 2019-03-14

## 2019-03-18 ENCOUNTER — OFFICE VISIT - HEALTHEAST (OUTPATIENT)
Dept: GERIATRICS | Facility: CLINIC | Age: 69
End: 2019-03-18

## 2019-03-18 DIAGNOSIS — J44.0 CHRONIC OBSTRUCTIVE PULMONARY DISEASE WITH ACUTE LOWER RESPIRATORY INFECTION (H): ICD-10-CM

## 2019-03-18 DIAGNOSIS — J18.9 PNEUMONIA OF LEFT LOWER LOBE DUE TO INFECTIOUS ORGANISM: ICD-10-CM

## 2019-03-28 ENCOUNTER — OFFICE VISIT - HEALTHEAST (OUTPATIENT)
Dept: GERIATRICS | Facility: CLINIC | Age: 69
End: 2019-03-28

## 2019-03-28 DIAGNOSIS — J44.0 CHRONIC OBSTRUCTIVE PULMONARY DISEASE WITH ACUTE LOWER RESPIRATORY INFECTION (H): ICD-10-CM

## 2019-03-28 DIAGNOSIS — E11.8 TYPE 2 DIABETES MELLITUS WITH COMPLICATION, WITHOUT LONG-TERM CURRENT USE OF INSULIN (H): ICD-10-CM

## 2019-03-28 DIAGNOSIS — G81.94 LEFT HEMIPARESIS (H): ICD-10-CM

## 2019-03-28 DIAGNOSIS — I69.921 DYSPHASIA DUE TO CEREBROVASCULAR DISEASE: ICD-10-CM

## 2019-03-28 DIAGNOSIS — I63.9 CEREBROVASCULAR ACCIDENT (CVA), UNSPECIFIED MECHANISM (H): ICD-10-CM

## 2019-04-22 ENCOUNTER — OFFICE VISIT - HEALTHEAST (OUTPATIENT)
Dept: GERIATRICS | Facility: CLINIC | Age: 69
End: 2019-04-22

## 2019-04-22 DIAGNOSIS — I63.9 CEREBROVASCULAR ACCIDENT (CVA), UNSPECIFIED MECHANISM (H): ICD-10-CM

## 2019-04-22 DIAGNOSIS — L98.9 SKIN LESION OF RIGHT LEG: ICD-10-CM

## 2019-05-01 ENCOUNTER — OFFICE VISIT - HEALTHEAST (OUTPATIENT)
Dept: GERIATRICS | Facility: CLINIC | Age: 69
End: 2019-05-01

## 2019-05-01 DIAGNOSIS — L98.9 SKIN LESION OF RIGHT LEG: ICD-10-CM

## 2019-05-01 DIAGNOSIS — I63.9 CEREBROVASCULAR ACCIDENT (CVA), UNSPECIFIED MECHANISM (H): ICD-10-CM

## 2019-05-01 DIAGNOSIS — Z86.59 HISTORY OF POSTTRAUMATIC STRESS DISORDER (PTSD): ICD-10-CM

## 2019-05-01 DIAGNOSIS — F48.2 PSEUDOBULBAR AFFECT: ICD-10-CM

## 2019-06-12 ENCOUNTER — OFFICE VISIT - HEALTHEAST (OUTPATIENT)
Dept: GERIATRICS | Facility: CLINIC | Age: 69
End: 2019-06-12

## 2019-06-12 DIAGNOSIS — G81.94 LEFT HEMIPARESIS (H): ICD-10-CM

## 2019-06-12 DIAGNOSIS — I10 ESSENTIAL HYPERTENSION: ICD-10-CM

## 2019-06-12 DIAGNOSIS — I63.9 CEREBROVASCULAR ACCIDENT (CVA), UNSPECIFIED MECHANISM (H): ICD-10-CM

## 2019-06-12 DIAGNOSIS — E11.8 TYPE 2 DIABETES MELLITUS WITH COMPLICATION, WITHOUT LONG-TERM CURRENT USE OF INSULIN (H): ICD-10-CM

## 2019-06-12 DIAGNOSIS — J44.0 CHRONIC OBSTRUCTIVE PULMONARY DISEASE WITH ACUTE LOWER RESPIRATORY INFECTION (H): ICD-10-CM

## 2019-06-12 DIAGNOSIS — L98.9 SKIN LESION OF RIGHT LEG: ICD-10-CM

## 2019-06-12 DIAGNOSIS — F48.2 PSEUDOBULBAR AFFECT: ICD-10-CM

## 2019-06-12 DIAGNOSIS — I69.921 DYSPHASIA DUE TO CEREBROVASCULAR DISEASE: ICD-10-CM

## 2019-07-15 ENCOUNTER — AMBULATORY - HEALTHEAST (OUTPATIENT)
Dept: GERIATRICS | Facility: CLINIC | Age: 69
End: 2019-07-15

## 2021-05-27 NOTE — PROGRESS NOTES
[facility]    Facility:   [facility] [[id number]]   Code Status: FULL CODE      CHIEF COMPLAINT/REASON FOR VISIT:  Chief Complaint   Patient presents with     Review Of Multiple Medical Conditions       HISTORY:      HPI: Tariq is a 69 y.o. male Z is a had a CVA with left-sided hemiparesis as well as dysphasia.  And he has had hypoxemia.  He is currently weaned him off of the oxygen and he is doing well off the oxygen satting appropriately 94% on room air with a respiratory rate of 17.  He was recently sent to the emergency room found to have a pleural effusion with some mild leukocytosis a white count is 11.3.  He was placed on oral antibiotics and he continues on Keflex.  He did complete his antibiotics at this time he did discontinue nebulizers now he is off the oxygen.  He does need to follow-up with pulmonology and have a chest CT secondary to secondary nodules that were found but he is somewhat hesitant to do this.  He has no new concerns at this time and nursing staff have no new concerns.    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     CVA (cerebral vascular accident) (H)      DM2 (diabetes mellitus, type 2) (H)      Dysarthria      Dysphagia      HLD (hyperlipidemia)      HTN (hypertension)      Hyperglycemia      Hyperlipidemia      Hypertension      Left hemiparesis (H)      Long-term use of aspirin therapy      Myocardial infarction (H)     NSTEMI     Pulmonary nodules     left     Tobacco abuse              Family History   Problem Relation Age of Onset     Diabetes Mother      Asthma Mother      Lung cancer Father      Asthma Maternal Grandmother      Social History     Socioeconomic History     Marital status:      Spouse  name: None     Number of children: None     Years of education: None     Highest education level: None   Occupational History     None   Social Needs     Financial resource strain: None     Food insecurity:     Worry: None     Inability: None     Transportation needs:     Medical: None     Non-medical: None   Tobacco Use     Smoking status: Former Smoker     Packs/day: 2.00     Years: 38.00     Pack years: 76.00     Smokeless tobacco: Former User   Substance and Sexual Activity     Alcohol use: No     Drug use: None     Sexual activity: None   Lifestyle     Physical activity:     Days per week: None     Minutes per session: None     Stress: None   Relationships     Social connections:     Talks on phone: None     Gets together: None     Attends Church service: None     Active member of club or organization: None     Attends meetings of clubs or organizations: None     Relationship status: None     Intimate partner violence:     Fear of current or ex partner: None     Emotionally abused: None     Physically abused: None     Forced sexual activity: None   Other Topics Concern     None   Social History Narrative     None         Review of Systems   Constitutional:        Patient denies any pain fevers chills nausea vomiting diarrhea change in vision hearing taste  or smell he does have weakness on his left side which has not changed and he has no chest pain or shortness of breath.  He urinated without difficulty without any urgency frequency burning with urination and is moving his bowels without difficulty.  The remainder the review of systems is negative.       .  Vitals:    03/28/19 2000   BP: 129/69   Pulse: 62   Resp: 17   Temp: 98.7  F (37.1  C)   SpO2: 94%       Physical Exam   Constitutional: No distress.   HENT:   Head: Normocephalic and atraumatic.   Eyes: Conjunctivae are normal. Right eye exhibits no discharge. Left eye exhibits no discharge.   Neck: Neck supple. No thyromegaly present.   Cardiovascular:  Normal rate and regular rhythm.   No murmur heard.  Pulmonary/Chest: Effort normal and breath sounds normal. No respiratory distress. He has no wheezes. He has no rales.   Abdominal: Soft. Bowel sounds are normal. He exhibits no distension. There is no tenderness.   Musculoskeletal: He exhibits no edema or tenderness.   Lymphadenopathy:     He has no cervical adenopathy.   Neurological:   Left-sided hemiparesis is noted has not changed since last exam.   Skin: Skin is warm and dry. He is not diaphoretic.   Psychiatric: He has a normal mood and affect. His behavior is normal.         LABS:       ASSESSMENT:      ICD-10-CM    1. Chronic obstructive pulmonary disease with acute lower respiratory infection (H) J44.0    2. Type 2 diabetes mellitus with complication, without long-term current use of insulin (H) E11.8    3. 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 Left hemiparesis (H) G81.94    5. Cerebrovascular accident (CVA), unspecified mechanism (H) I63.9        PLAN: Plan at this time will continue to monitor above medical problems and no new changes.  Care plan was reviewed and is appropriate.  I was unable to get his blood sugars up at this time on the matrix.  We will check these later.      Total  minutes of which % was spent counseling and coordination of care of the above plan.    Electronically signed by: Elver Murphy DO

## 2021-05-28 NOTE — PROGRESS NOTES
Code Status:  Full Code  Visit Type: Problem Visit     Facility:  [facility]  [[id number]]         Facility Type: Long Term Care      History of Present Illness: Tariq Pinzon is a 69 y.o. male whom I am seeing today for sores to his LE.   Past medical history includes COPD, CVA with left-sided paresis, hypertension, dysphagia, diabetes type 2, CAD and hypertension.  Patient with recent episode in which she became very weak with slurred speech.  He does have a history of CVA. Pt with mx abrasions to LE with redness surrounding. He picks at his skin. He initially denies any itching but then points to a bottle of gold bond. He also has an area of scratching to his wrist. He is a-febrile. I have treated him with oral antibiotics in the past due to like lesions. He often refuses bactroban to be placed on lesions.           Active Ambulatory Problems     Diagnosis Date Noted     CVA (cerebral vascular accident) (H) 03/19/2018     Left hemiparesis (H) 03/19/2018     Hypertension 03/19/2018     Dysarthria 03/19/2018     Dysphagia 03/19/2018     Diabetes mellitus (H) 03/22/2018     COPD (chronic obstructive pulmonary disease) (H) 04/16/2018     Resolved Ambulatory Problems     Diagnosis Date Noted     No Resolved Ambulatory Problems     Past Medical History:   Diagnosis Date     Abnormality of gait      Acute bronchospasm      Adjustment disorder with anxiety      Alcohol use disorder, mild, abuse      Asthma      CAD (coronary artery disease)      COPD (chronic obstructive pulmonary disease) (H)      CVA (cerebral vascular accident) (H)      DM2 (diabetes mellitus, type 2) (H)      Dysarthria      Dysphagia      HLD (hyperlipidemia)      HTN (hypertension)      Hyperglycemia      Hyperlipidemia      Hypertension      Left hemiparesis (H)      Long-term use of aspirin therapy      Myocardial infarction (H)      Pulmonary nodules      Tobacco abuse        Meds reviewed at the facility.     No Known  Allergies      Review of Systems   No fevers or chills. No headache, lightheadedness or dizziness.  Denies shortness of breath or chest pain.  Only using oxygen at night.  Reports only a dry cough.  Appetite is good. No nausea, vomiting, constipation or diarrhea. No dysuria, frequency, burning or pain with urination. Reports itching denies need for additional treatment other than gold bond. He often picks at lesions.       Physical Exam   PHYSICAL EXAMINATION  Vital signs: /67, P 75, R 20, T 96.7,  O2 sat 97%.   Current weight 137.2 pounds.  General: Awake, Alert, oriented x3, appropriately, follows simple commands, conversant. Sitting up in wheelchair.   NECK: Supple  EXTREMITIES:   Left side hemiparesis.He is able to wiggle his fingers. He can raise his left arm half way.   No lower extremity edema.  SKIN: Multiple skin lesions to lower extremities. Mid calf lesion with redness and slightly warm maddy wound. No drainage. Scratches to his right wrist region.   NEUROLOGIC: CVA with left-sided hemiparesis, pulses palpable  PSYCHIATRIC: Cognition intact.        Assessment/Plan:  1. Skin lesion of right leg     2. Cerebrovascular accident (CVA), unspecified mechanism (H)       Pt with hx of CVA. Mx lesions to LE with hx of picking and scratching. He has gold bond powder for itching. Denies the need for Sarna. He has been refusing his Bactroban ont. Area red on exam. Continue Bactroban two times a day to lesions.       Electronically signed by: Sarah Smith CNP

## 2021-05-28 NOTE — PROGRESS NOTES
"Code Status:  Full Code  Visit Type: Problem Visit     Facility:  [facility] [[id number]]         Facility Type: Long Term Care      History of Present Illness: Tariq Pinzon is a 69 y.o. male whom I am seeing today for follow up of sores to his LE.   Past medical history includes COPD, CVA with left-sided paresis, hypertension, dysphagia, diabetes type 2, CAD and hypertension.   Pt with mx abrasions to LE with recent redness surrounding. He picks at his skin. Pt treated with Bactroban topically.  Nursing staff report the patient occasionally refuses treatment.  Today area is improved less red no warmth.  Areas all scabs.  No drainage.  He has had some increase anger symptoms.  He reports not getting along with multiple family members including his daughter.  He does report increased loneliness since he moved rooms.  He has had a history of pseudo-bulbar affect disorder post stroke however today he says he has a long-standing history of posttraumatic stress disorder.  This was back in the early 2000's.  He was seen by psychiatry.  He said he did take an antidepressant for about 5 months and then took himself off of that.  This was sometime around 2008.  He denies the need for medication.  He is awaiting transfer to a different SNF down in [facility] closer to what he calls \"[place]\"[place].  He is very eager to go there.  His PHQ 9 in March was 4 out of 27. BIMS score was 15.  He does miss his old room and nurses station in which he sat right across from the nurses desk and was very in tune to it was going on, on the unit and did joke around with the nursing staff.    Active Ambulatory Problems     Diagnosis Date Noted     CVA (cerebral vascular accident) (H) 03/19/2018     Left hemiparesis (H) 03/19/2018     Hypertension 03/19/2018     Dysarthria 03/19/2018     Dysphagia 03/19/2018     Diabetes mellitus (H) 03/22/2018     COPD (chronic obstructive pulmonary disease) (H) 04/16/2018 "     Resolved Ambulatory Problems     Diagnosis Date Noted     No Resolved Ambulatory Problems     Past Medical History:   Diagnosis Date     Abnormality of gait      Acute bronchospasm      Adjustment disorder with anxiety      Alcohol use disorder, mild, abuse      Asthma      CAD (coronary artery disease)      COPD (chronic obstructive pulmonary disease) (H)      CVA (cerebral vascular accident) (H)      DM2 (diabetes mellitus, type 2) (H)      Dysarthria      Dysphagia      HLD (hyperlipidemia)      HTN (hypertension)      Hyperglycemia      Hyperlipidemia      Hypertension      Left hemiparesis (H)      Long-term use of aspirin therapy      Myocardial infarction (H)      Pulmonary nodules      Tobacco abuse        Meds reviewed at the facility.     No Known Allergies      Review of Systems   No fevers or chills. No headache, lightheadedness or dizziness.  Denies shortness of breath or chest pain.  Only using oxygen at night.  Reports only a dry cough.  Appetite is good. No nausea, vomiting, constipation or diarrhea. No dysuria, frequency, burning or pain with urination. He would like to stop taking all meds.  He often picks at lesions. Increased feelings of loneliness. Hx of PTSD. He denies the need for meds.       Physical Exam   PHYSICAL EXAMINATION  Vital signs: /60, heart rate 57, respirations 18, temperature 97.8, O2 sat 96% on room air. Current weight 137.7 pounds.  General: Awake, Alert, oriented x3, appropriately, follows simple commands, conversant. Sitting up in wheelchair.   NECK: Supple  EXTREMITIES:   Left side hemiparesis.  He does have some movement in the left upper extremity.  No lower extremity edema.  SKIN: Multiple skin lesions to lower extremities healing.. Mid calf lesion with less redness on today's visit. Scabbed.  Scratches to his right wrist region.   NEUROLOGIC: CVA with left-sided hemiparesis, pulses palpable  PSYCHIATRIC: Cognition intact. Anger and depressive symptoms noted.        Assessment/Plan:  1. Skin lesion of right leg     2. Cerebrovascular accident (CVA), unspecified mechanism (H)     3. Pseudobulbar affect     4. History of posttraumatic stress disorder (PTSD)       Pt with hx of CVA. Mx lesions to LE with hx of picking and scratching.  Area seems to be improving with Bactroban however he does refuse this often.  He would like to discontinue most of his medications however I did review the need for these including meds for his COPD and stroke prevention.  History of pseudobulbar affect which seems to be improved.  However he has had some increase depressive symptoms and anger.  He tells me today he has a history of posttraumatic stress disorder.  He has spent years going through those emotions.  He did see psychiatry back in 2000 and was on an antidepressant briefly.  He refuses the need for this currently.  [place].    Electronically signed by: Sarah Smith, JORGE LUIS

## 2021-05-29 NOTE — PROGRESS NOTES
Code Status:  Full Code  Visit Type: Discharge Summary     Facility:  [facility] [[id number]]         Facility Type: Long Term Care      History of Present Illness: Tariq Pinzon is a 69 y.o. male whom I am seeing today for discharge to another LTC facility down in [facility]. Past medical history includes COPD, CVA with left-sided paresis, hypertension, dysphagia, diabetes type 2, CAD and hypertension. Pt with left sided weakness. He is able to ambulate with hosea walker. COPD dependent on oxygen at night.  He continues on Advair and Duo Nebs. Pt hospitalized in March for pulmonary edema.There were some suspicious nodule versus mass found on CT.  He has refused follow-up pulmonary consult as well as chest CT. Pt with mx abrasions to LE with recent redness surrounding. He picks at his skin. Pt treated with Bactroban topically. He has had a history of pseudo-bulbar affect disorder post stroke however today he says he has a long-standing history of posttraumatic stress disorder.  This was back in the early 2000's.  He was seen by psychiatry.  He said he did take an antidepressant for about 5 months and then took himself off of that.  This was sometime around 2008.  He denies the need for medication.  His PHQ 9 in March was 4 out of 27. BIMS score was 15. DM well controlled with glipizide. He has been weaned off his insulin.     Active Ambulatory Problems     Diagnosis Date Noted     CVA (cerebral vascular accident) (H) 03/19/2018     Left hemiparesis (H) 03/19/2018     Hypertension 03/19/2018     Dysarthria 03/19/2018     Dysphagia 03/19/2018     Diabetes mellitus (H) 03/22/2018     COPD (chronic obstructive pulmonary disease) (H) 04/16/2018     Resolved Ambulatory Problems     Diagnosis Date Noted     No Resolved Ambulatory Problems     Past Medical History:   Diagnosis Date     Abnormality of gait      Acute bronchospasm      Adjustment disorder with anxiety      Alcohol use disorder, mild, abuse       Asthma      CAD (coronary artery disease)      COPD (chronic obstructive pulmonary disease) (H)      CVA (cerebral vascular accident) (H)      DM2 (diabetes mellitus, type 2) (H)      Dysarthria      Dysphagia      HLD (hyperlipidemia)      HTN (hypertension)      Hyperglycemia      Hyperlipidemia      Hypertension      Left hemiparesis (H)      Long-term use of aspirin therapy      Myocardial infarction (H)      Pulmonary nodules      Tobacco abuse        Meds reviewed at the facility.     No Known Allergies      Review of Systems   No fevers or chills. No headache, lightheadedness or dizziness.  Denies shortness of breath or chest pain.  Only using oxygen at night.  Reports only a dry cough.  Appetite is good. No nausea, vomiting, constipation or diarrhea. No dysuria, frequency, burning or pain with urination. He would like to stop taking all meds.  He often picks at lesions. Increased feelings of loneliness. Hx of PTSD. He denies the need for meds.       Physical Exam   PHYSICAL EXAMINATION  Vital signs: /73, heart rate 66 and respirations 18, temperature 97, O2 sat 95% on room air. Current weight 138.8 pounds.  General: Awake, Alert, oriented x3, appropriately, follows simple commands, conversant. Sitting up in wheelchair.   HEENT: Slight left-sided facial droop.  Tongue is midline.   NECK: Supple, with  No lymphadenopathy  CARDIOVASCULAR: S1-S2 without murmur gallop.  RESPIRATORY:   No wheezes rales or rhonchi.  Dry cough.  ABDOMEN: Soft non-distended with positive bowel sounds.   EXTREMITIES:   Left side hemiparesis.  No lower extremity edema.  Moves all extremities.  SKIN: Multiple skin lesions to lower extremities healing.  NEUROLOGIC: CVA with left-sided hemiparesis, pulses palpable  PSYCHIATRIC: Cognition intact. Happy today on exam. Making jokes.     Labs:  Lab Results   Component Value Date    WBC 11.9 (H) 05/21/2018    HGB 12.5 (L) 05/21/2018    HCT 37.1 (L) 05/21/2018    MCV 91 05/21/2018      05/21/2018     Results for orders placed or performed in visit on 05/09/18   Basic Metabolic Panel   Result Value Ref Range    Sodium 141 136 - 145 mmol/L    Potassium 4.0 3.5 - 5.0 mmol/L    Chloride 104 98 - 107 mmol/L    CO2 29 22 - 31 mmol/L    Anion Gap, Calculation 8 5 - 18 mmol/L    Glucose 99 70 - 125 mg/dL    Calcium 9.3 8.5 - 10.5 mg/dL    BUN 18 8 - 22 mg/dL    Creatinine 0.84 0.70 - 1.30 mg/dL    GFR MDRD Af Amer >60 >60 mL/min/1.73m2    GFR MDRD Non Af Amer >60 >60 mL/min/1.73m2             Assessment/Plan:  1. Cerebrovascular accident (CVA), unspecified mechanism (H)   -continues on ASA, Statin and Metoprolol.       2. Chronic obstructive pulmonary disease with acute lower respiratory infection (H)   -On Adv  air and and Duo Nebs.   -Oxygen dependent over night.       3. Type 2 diabetes mellitus with complication, without long-term current use of insulin (H)   -satisfactory controlled on glipizide. Weaned off insulin.       4. 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 Pseudobulbar affect   -Denies need for med. Hx of PTSD.       6. Left hemiparesis (H)   -Ambulates with hosea walker.       7. Essential hypertension   -satisfactory on Metoprolol.      8. Skin lesion of  leg   -Bactroban ointment.       Pt to d/c to LTC with current meds and treatments including nebs and oxygen. PT, OT, ST to eval and tx. Follow up with PCP in 1 week.     Electronically signed by: Sarah Smith, JORGE LUIS

## 2021-06-01 VITALS — WEIGHT: 153.2 LBS | BODY MASS INDEX: 22.62 KG/M2

## 2021-06-01 VITALS — WEIGHT: 151.8 LBS | BODY MASS INDEX: 22.42 KG/M2

## 2021-06-01 VITALS — WEIGHT: 151 LBS | BODY MASS INDEX: 22.3 KG/M2

## 2021-06-01 VITALS — WEIGHT: 152.2 LBS | BODY MASS INDEX: 22.48 KG/M2

## 2021-06-01 VITALS — BODY MASS INDEX: 22.56 KG/M2 | WEIGHT: 152.8 LBS

## 2021-06-01 VITALS — WEIGHT: 153.6 LBS | BODY MASS INDEX: 22.68 KG/M2

## 2021-06-01 VITALS — WEIGHT: 154.6 LBS | BODY MASS INDEX: 22.83 KG/M2

## 2021-06-01 VITALS — BODY MASS INDEX: 22.3 KG/M2 | WEIGHT: 151 LBS

## 2021-06-01 VITALS — BODY MASS INDEX: 22.83 KG/M2 | WEIGHT: 154.6 LBS

## 2021-06-16 NOTE — PROGRESS NOTES
Code Status:  POLST AVAILABLE  Visit Type: Problem Visit     Facility:  [facility] [[id number]]         Facility Type: SNF (Skilled Nursing Facility, TCU)    History of Present Illness: Tariq Pinzon is a 68 y.o. male whom I am seeing today for follow-up on the TCU.  Patient recently hospitalized on 2/15/2018.  He presented to the hospital with left-sided weakness and CT scan showed a subacute/chronic cortical infarct in the right coronary radiata.  CT angiogram was done and showed intracranial atherosclerotic sclerosis and narrowing but no large vessel occlusion.  An MRI showed right pontine infarct as well as small left temporal infarct and right coronary radiata coronary infarct.  He has a history of cerebrovascular disease and had not been taking his medications at home.  He has underlying diabetes.  Poor management.  Underlying COPD hypertension and dyslipidemia.  He is oxygen dependent at home however  he tells me he only wears this at night.  He has left dense hemiparesis.  Some dysphagia as well as aphasia.  He continues in therapy including speech therapy.  No movement on the left.  History of alcohol abuse as well as adjustment disorder with anxiety.     Today patient reports pain in his left shoulder.  He does have some pain with  abduction and abduction as well as straight arm raise.  Bony prominence in the shoulder.  This is on his stroke affected side.  No redness or warmth.  He denies any dysphagia.  He continues to see speech therapy.  He continually tries to clear his throat today.  Lung sounds are clear.  He continues on oxygen.      Active Ambulatory Problems     Diagnosis Date Noted     No Active Ambulatory Problems     Resolved Ambulatory Problems     Diagnosis Date Noted     No Resolved Ambulatory Problems     Past Medical History:   Diagnosis Date     Abnormality of gait      Acute bronchospasm      Adjustment disorder with anxiety      Alcohol use disorder, mild, abuse       CAD (coronary artery disease)      COPD (chronic obstructive pulmonary disease)      CVA (cerebral vascular accident)      DM2 (diabetes mellitus, type 2)      Dysarthria      Dysphagia      HLD (hyperlipidemia)      HTN (hypertension)      Hyperglycemia      Left hemiparesis        Current Outpatient Prescriptions   Medication Sig     albuterol (PROAIR HFA;PROVENTIL HFA;VENTOLIN HFA) 90 mcg/actuation inhaler Inhale 1-2 puffs every 4 (four) hours as needed for wheezing.     aspirin 325 MG EC tablet Take 325 mg by mouth daily.     atorvastatin (LIPITOR) 40 MG tablet Take 40 mg by mouth at bedtime.     clopidogrel (PLAVIX) 75 mg tablet Take 75 mg by mouth daily.     fluticasone-salmeterol (ADVAIR) 250-50 mcg/dose DISKUS Inhale 1 puff 2 (two) times a day.     glipiZIDE (GLUCOTROL XL) 10 MG 24 hr tablet Take 10 mg by mouth every evening. And 5 mg at noon     lisinopril (PRINIVIL,ZESTRIL) 20 MG tablet Take 20 mg by mouth daily.     senna-docusate (SENNOSIDES-DOCUSATE SODIUM) 8.6-50 mg tablet Take 2 tablets by mouth 2 (two) times a day.     sitaGLIPtin (JANUVIA) 100 MG tablet Take 100 mg by mouth daily.       No Known Allergies      Review of Systems   No fevers or chills. No headache, lightheadedness or dizziness. Chronic  SOB, no chest pains or palpitations.  He does wear O2 at home at night he tells me.  Appetite is fair.  He denies any dysphagia.  However he attempts to clear his throat frequently.  No nausea, vomiting, constipation or diarrhea. No dysuria, frequency, burning or pain with urination.  He is incontinent. Left dense hemiparesis.  Pain in the left shoulder region.          Physical Exam   PHYSICAL EXAMINATION:  Vital signs:   Vitals:    03/15/18 1913   BP: 112/68   Pulse: 81   Resp: 14   Temp: 97.9  F (36.6  C)   SpO2: 98%     General: Awake, Alert, oriented x3, appropriately, follows simple commands, conversant  HEENT:PERRLA, Pink conjunctiva, anicteric sclerae, moist oral mucosa.  Slight left-sided  facial droop.  Tongue is midline.  He does have lesions to the tongue which are improving.  Attempts to clear his throat several times.  NECK: Supple, without any lymphadenopathy, or masses  CVS:  S1  S2, without murmur or gallop.   LUNG: Clear to auscultation, No wheezes, rales or rhonci.  Somewhat shallow respiratory effort.  Continues on O2 at 2 L nasal cannula.  No cough on exam.  BACK: No kyphosis of the thoracic spine  ABDOMEN: Soft, nontender to palpation, with positive bowel sounds  EXTREMITIES: Unable to move left side.  Tenderness in the left shoulder.  Bone-on-bone prominence noted.  Pain with straight arm raise and abduction and abduction.  No redness or warmth.  SKIN: Warm and dry, no rashes or erythema noted  NEUROLOGIC: Intact, pulses palpable.  Left-sided hemiparesis.  PSYCHIATRIC: Cognition intact.  Attempts to make jokes.          Labs:    Recent Results (from the past 240 hour(s))   Basic Metabolic Panel   Result Value Ref Range    Sodium 142 136 - 145 mmol/L    Potassium 3.8 3.5 - 5.0 mmol/L    Chloride 108 (H) 98 - 107 mmol/L    CO2 28 22 - 31 mmol/L    Anion Gap, Calculation 6 5 - 18 mmol/L    Glucose 141 (H) 70 - 125 mg/dL    Calcium 8.8 8.5 - 10.5 mg/dL    BUN 12 8 - 22 mg/dL    Creatinine 0.82 0.70 - 1.30 mg/dL    GFR MDRD Af Amer >60 >60 mL/min/1.73m2    GFR MDRD Non Af Amer >60 >60 mL/min/1.73m2   HM2(CBC w/o Differential)   Result Value Ref Range    WBC 6.6 4.0 - 11.0 thou/uL    RBC 3.26 (L) 4.40 - 6.20 mill/uL    Hemoglobin 10.6 (L) 14.0 - 18.0 g/dL    Hematocrit 31.0 (L) 40.0 - 54.0 %    MCV 95 80 - 100 fL    MCH 32.5 27.0 - 34.0 pg    MCHC 34.2 32.0 - 36.0 g/dL    RDW 12.4 11.0 - 14.5 %    Platelets 308 140 - 440 thou/uL    MPV 10.5 8.5 - 12.5 fL         Assessment/Plan:  1. Acute CVA (cerebrovascular accident)     2. Left hemiparesis     3. Dysphasia     4. Left shoulder pain     5. Type 2 diabetes mellitus     6. Hypertension     7. Hypoxia     8. Coronary artery disease        Patient with acute CVA with left-sided hemiparesis.  He continues in therapy.  No movement of the left side.  He does report left shoulder pain.  Large bony prominence.  Could be from working the arm in therapy.  Will treat with Tylenol and muscle rub.  Apply ice today and heat   Over the next 48 hours.  Dysphagia.  He continues with speech therapy.  He continuously tries to her throat today.  Lung sounds are clear.  He denies any dysphagia.  He does continue on oxygen.  He was wearing this chronically at night at home.  Attempt to wean him back down to his norm.  Type 2 diabetes.  Satisfactory controlled.  Hypertension.  Satisfactory controlled.  Laboratory reviewed.  Unremarkable.    Electronically signed by: Sarah Smith, JORGE LUIS

## 2021-06-16 NOTE — PROGRESS NOTES
[facility]    Facility:   [facility] [[id number]]   Code Status: UNKNOWN      CHIEF COMPLAINT/REASON FOR VISIT:  Chief Complaint   Patient presents with     Follow Up     cva, rehab       HISTORY:      HPI: Tariq is a 68 y.o. male who I had a chance to visit with secondary to his hospitalization February 15, 2018 secondary to left-sided weakness with a CAT scan showing a subacute/chronic lacunar infarct MRI did show a right pontine infarct.  He does have left-sided hemiparesis.  He is able to make some mild movements but otherwise still really weak and he is working with therapy.  Most recently was seen for some left shoulder discomfort is probably secondary to the weakness and the muscular deconditioning from the stroke otherwise no new changes.  Blood sugars been ranging 89-1 41.  He has been normotensive.  Working with speech therapy.  Does have dysphagia and dysarthria.  He does not feel he needs oxygen.  His been off of it a couple different times will see if we can wean him off the oxygen.  We also the pleasant talk with him going up and [place] and he does like to hunt and fish.  He also worked at the [employer].  He does have underlying COPD dyslipidemia.    Past Medical History:   Diagnosis Date     Abnormality of gait      Acute bronchospasm      Adjustment disorder with anxiety      Alcohol use disorder, mild, abuse      CAD (coronary artery disease)      COPD (chronic obstructive pulmonary disease)      CVA (cerebral vascular accident)      DM2 (diabetes mellitus, type 2)      Dysarthria      Dysphagia      HLD (hyperlipidemia)      HTN (hypertension)      Hyperglycemia      Left hemiparesis              No family history on file.  Social History     Social History     Marital status:      Spouse name: N/A     Number of children: N/A     Years of education: N/A     Social History Main Topics     Smoking status: Former Smoker     Smokeless tobacco: Not on  file     Alcohol use Yes     Drug use: Not on file     Sexual activity: Not on file     Other Topics Concern     Not on file     Social History Narrative         Review of Systems  Currently denies any chills fever coughing wheezing flulike symptoms nausea vomiting diarrhea dysuria rashes or sores.  Does have a history of recent right pontine infarct CVD CAD hypertension hyperlipidemia COPD    Current Outpatient Prescriptions   Medication Sig     albuterol (PROAIR HFA;PROVENTIL HFA;VENTOLIN HFA) 90 mcg/actuation inhaler Inhale 1-2 puffs every 4 (four) hours as needed for wheezing.     aspirin 325 MG EC tablet Take 325 mg by mouth daily.     atorvastatin (LIPITOR) 40 MG tablet Take 40 mg by mouth at bedtime.     clopidogrel (PLAVIX) 75 mg tablet Take 75 mg by mouth daily.     fluticasone-salmeterol (ADVAIR) 250-50 mcg/dose DISKUS Inhale 1 puff 2 (two) times a day.     glipiZIDE (GLUCOTROL XL) 10 MG 24 hr tablet Take 10 mg by mouth every evening. And 5 mg at noon     lisinopril (PRINIVIL,ZESTRIL) 20 MG tablet Take 20 mg by mouth daily.     senna-docusate (SENNOSIDES-DOCUSATE SODIUM) 8.6-50 mg tablet Take 2 tablets by mouth 2 (two) times a day.     sitaGLIPtin (JANUVIA) 100 MG tablet Take 100 mg by mouth daily.       .There were no vitals filed for this visit.  Blood pressure 127/66 pulse 70 respirations 16 saturation room air 95%  Physical Exam   Constitutional: He is oriented to person, place, and time. No distress.   HENT:   Head: Normocephalic.   Eyes: Pupils are equal, round, and reactive to light.   Neck: Neck supple. No thyromegaly present.   Cardiovascular: Normal rate, regular rhythm and normal heart sounds.    Pulmonary/Chest: Breath sounds normal.   Dysphagia.   Abdominal: Bowel sounds are normal. There is no tenderness. There is no guarding.   Musculoskeletal:   History of right pontine infarct.  Left hemiparesis   Lymphadenopathy:     He has no cervical adenopathy.   Neurological: He is oriented to person,  place, and time.   Mild left facial droop   Skin: Skin is warm and dry. No rash noted.   Psychiatric: His behavior is normal.   History of depression and anxiety         LABS:   Lab Results   Component Value Date    WBC 6.6 03/15/2018    HGB 10.6 (L) 03/15/2018    HCT 31.0 (L) 03/15/2018    MCV 95 03/15/2018     03/15/2018     Results for orders placed or performed in visit on 03/15/18   Basic Metabolic Panel   Result Value Ref Range    Sodium 142 136 - 145 mmol/L    Potassium 3.8 3.5 - 5.0 mmol/L    Chloride 108 (H) 98 - 107 mmol/L    CO2 28 22 - 31 mmol/L    Anion Gap, Calculation 6 5 - 18 mmol/L    Glucose 141 (H) 70 - 125 mg/dL    Calcium 8.8 8.5 - 10.5 mg/dL    BUN 12 8 - 22 mg/dL    Creatinine 0.82 0.70 - 1.30 mg/dL    GFR MDRD Af Amer >60 >60 mL/min/1.73m2    GFR MDRD Non Af Amer >60 >60 mL/min/1.73m2       No results found for: HGBA1C      ASSESSMENT:      ICD-10-CM    1. CVA (cerebral vascular accident) I63.9    2. Left hemiparesis G81.94    3. Hypertension I10    4. Dysarthria R47.1    5. Dysphagia R13.10        PLAN:    At this time no further changes are necessary we will see if we can wean him off the oxygen.  Otherwise he is pleased with therapy.  He will continue to keep me updated as any other problems but otherwise he does seem to have a pretty good attitude regarding his current chronic medical conditions.        Electronically signed by: Michael Duane Johnson, CNP

## 2021-06-16 NOTE — PROGRESS NOTES
[facility]    Facility:   [facility] [[id number]]   Code Status: FULL CODE      CHIEF COMPLAINT/REASON FOR VISIT:  Chief Complaint   Patient presents with     Follow Up     rehab, cva       HISTORY:      HPI: Tariq is a 68 y.o. male who I had the pleasure to revisit with secondary to his hospitalization February 15, 2018 secondary left-sided weakness with a CAT scan showing a subacute/chronic lacunar infarct and MRI did show a right pontine infarct.  He does have left hemiparesis.  He is getting a little movement on those areas but otherwise he is feeling that he is making some success with the therapy suggestions and rehabilitation process.  He is not requiring any further oxygen so we will discontinue that.  He is only had one neb otherwise he does have inhalers.  His blood sugars ranging 80-1 40.  Appetite is seems to be improving the level 3 diet.  He is working with speech therapy secondary to dysphasia.  He has been normotensive and afebrile and also on room air.  Not having any discomfort.  We also once again did talk about some hunting and fishing as well as the new [employer] rules [facility] and that it is all [place] catch and release this year and how that will pretty much hurt a lot of the resorts in that area.  Otherwise again he does like to talk about his hunting and fishing days.  No COPD exacerbation.    Past Medical History:   Diagnosis Date     Abnormality of gait      Acute bronchospasm      Adjustment disorder with anxiety      Alcohol use disorder, mild, abuse      CAD (coronary artery disease)      COPD (chronic obstructive pulmonary disease)      CVA (cerebral vascular accident)      DM2 (diabetes mellitus, type 2)      Dysarthria      Dysphagia      HLD (hyperlipidemia)      HTN (hypertension)      Hyperglycemia      Left hemiparesis              No family history on file.  Social History     Social History     Marital status:      Spouse  name: N/A     Number of children: N/A     Years of education: N/A     Social History Main Topics     Smoking status: Former Smoker     Smokeless tobacco: Not on file     Alcohol use Yes     Drug use: Not on file     Sexual activity: Not on file     Other Topics Concern     Not on file     Social History Narrative         Review of Systems  Currently denies any chills fever coughing wheezing flulike symptoms nausea vomiting diarrhea dysuria rashes or sores.  Does have a history of recent right pontine infarct CVD CAD hypertension hyperlipidemia COPD          Current Outpatient Prescriptions   Medication Sig     albuterol (PROAIR HFA;PROVENTIL HFA;VENTOLIN HFA) 90 mcg/actuation inhaler Inhale 1-2 puffs every 4 (four) hours as needed for wheezing.     aspirin 325 MG EC tablet Take 325 mg by mouth daily.     atorvastatin (LIPITOR) 40 MG tablet Take 40 mg by mouth at bedtime.     clopidogrel (PLAVIX) 75 mg tablet Take 75 mg by mouth daily.     fluticasone-salmeterol (ADVAIR) 250-50 mcg/dose DISKUS Inhale 1 puff 2 (two) times a day.     glipiZIDE (GLUCOTROL XL) 10 MG 24 hr tablet Take 10 mg by mouth every evening. And 5 mg at noon     lisinopril (PRINIVIL,ZESTRIL) 20 MG tablet Take 20 mg by mouth daily.     senna-docusate (SENNOSIDES-DOCUSATE SODIUM) 8.6-50 mg tablet Take 2 tablets by mouth 2 (two) times a day.     sitaGLIPtin (JANUVIA) 100 MG tablet Take 100 mg by mouth daily.       .There were no vitals filed for this visit.  Blood pressure 115/58 pulse 73 respirations 16 saturation room air 95%  Physical Exam  Constitutional: He is oriented to person, place, and time. No distress.   HENT:    Eyes: Pupils are equal, round, and reactive to light.   Neck: Neck supple. No thyromegaly present.   Cardiovascular: Normal rate, regular rhythm and normal heart sounds.    Pulmonary/Chest: Breath sounds normal.   Dysphagia.   Abdominal: Bowel sounds are normal. There is no tenderness. There is no guarding.   Musculoskeletal:    History of right pontine infarct.  Left hemiparesis   Lymphadenopathy:     He has no cervical adenopathy.   Neurological: He is oriented to person, place, and time.   Mild left facial droop   Skin: Skin is warm and dry. No rash noted.   Psychiatric: His behavior is normal.   History of depression and anxiety     LABS:   Lab Results   Component Value Date    WBC 6.6 03/15/2018    HGB 10.6 (L) 03/15/2018    HCT 31.0 (L) 03/15/2018    MCV 95 03/15/2018     03/15/2018     No results found for: HGBA1C  Results for orders placed or performed in visit on 03/15/18   Basic Metabolic Panel   Result Value Ref Range    Sodium 142 136 - 145 mmol/L    Potassium 3.8 3.5 - 5.0 mmol/L    Chloride 108 (H) 98 - 107 mmol/L    CO2 28 22 - 31 mmol/L    Anion Gap, Calculation 6 5 - 18 mmol/L    Glucose 141 (H) 70 - 125 mg/dL    Calcium 8.8 8.5 - 10.5 mg/dL    BUN 12 8 - 22 mg/dL    Creatinine 0.82 0.70 - 1.30 mg/dL    GFR MDRD Af Amer >60 >60 mL/min/1.73m2    GFR MDRD Non Af Amer >60 >60 mL/min/1.73m2           ASSESSMENT:      ICD-10-CM    1. Dysarthria R47.1    2. Dysphagia R13.10    3. CVA (cerebral vascular accident) I63.9    4. Diabetes mellitus E11.9        PLAN:    At this time discontinue the oxygen due to nonuse.  Otherwise he does seem to make and what he feels is pretty good progress is so far satisfied with how things are going for him so we will continue to manage and follow from there.  He had no further questions regarding our visit today.      Electronically signed by: Michael Duane Johnson, CNP

## 2021-06-16 NOTE — PROGRESS NOTES
Code Status:  POLST AVAILABLE  Visit Type: Problem Visit     Facility:  [facility] [[id number]]         Facility Type: SNF (Skilled Nursing Facility, TCU)    History of Present Illness: Tariq Pinzon is a 68 y.o. male whom I am seeing today for follow-up on the TCU.  Patient recently hospitalized on 2/15/2018.  He presented to the hospital with left-sided weakness and CT scan showed a subacute/chronic cortical infarct in the right coronary radiata.  CT angiogram was done and showed intracranial atherosclerotic sclerosis and narrowing but no large vessel occlusion.  An MRI showed right pontine infarct as well as small left temporal infarct and right coronary radiata coronary infarct.  He has a history of cerebrovascular disease and had not been taking his medications at home.  He has underlying diabetes.  Poor management.  Underlying COPD hypertension and dyslipidemia.  He is oxygen dependent at home however today he tells me he only wears this at night.  He has left dense hemiparesis.  Some dysphagia as well as aphasia.  He continues in therapy including speech therapy.  No movement on the left.  History of alcohol abuse as well as adjustment disorder with anxiety.  Today he denies any pain.      Active Ambulatory Problems     Diagnosis Date Noted     No Active Ambulatory Problems     Resolved Ambulatory Problems     Diagnosis Date Noted     No Resolved Ambulatory Problems     Past Medical History:   Diagnosis Date     Abnormality of gait      Acute bronchospasm      Adjustment disorder with anxiety      Alcohol use disorder, mild, abuse      CAD (coronary artery disease)      COPD (chronic obstructive pulmonary disease)      CVA (cerebral vascular accident)      DM2 (diabetes mellitus, type 2)      Dysarthria      Dysphagia      HLD (hyperlipidemia)      HTN (hypertension)      Hyperglycemia      Left hemiparesis        Current Outpatient Prescriptions   Medication Sig     albuterol (PROAIR  HFA;PROVENTIL HFA;VENTOLIN HFA) 90 mcg/actuation inhaler Inhale 1-2 puffs every 4 (four) hours as needed for wheezing.     aspirin 325 MG EC tablet Take 325 mg by mouth daily.     atorvastatin (LIPITOR) 40 MG tablet Take 40 mg by mouth at bedtime.     clopidogrel (PLAVIX) 75 mg tablet Take 75 mg by mouth daily.     fluticasone-salmeterol (ADVAIR) 250-50 mcg/dose DISKUS Inhale 1 puff 2 (two) times a day.     glipiZIDE (GLUCOTROL XL) 10 MG 24 hr tablet Take 10 mg by mouth every evening. And 5 mg at noon     lisinopril (PRINIVIL,ZESTRIL) 20 MG tablet Take 20 mg by mouth daily.     senna-docusate (SENNOSIDES-DOCUSATE SODIUM) 8.6-50 mg tablet Take 2 tablets by mouth 2 (two) times a day.     sitaGLIPtin (JANUVIA) 100 MG tablet Take 100 mg by mouth daily.       No Known Allergies      Review of Systems   No fevers or chills. No headache, lightheadedness or dizziness. Chronic  SOB, no chest pains or palpitations.  He does wear O2 at home at night he tells me.  Appetite is fair.  No dysphagia however he denies this today. No nausea, vomiting, constipation or diarrhea. No dysuria, frequency, burning or pain with urination.  He is incontinent.  He denies any pain.  Left dense hemiparesis.           Physical Exam   PHYSICAL EXAMINATION:  Vital signs:   Vitals:    03/13/18 1759   BP: 109/63   Pulse: 70   Resp: 16   Temp: 98.3  F (36.8  C)   SpO2: 98%     General: Awake, Alert, oriented x3, appropriately, follows simple commands, conversant  HEENT:PERRLA, Pink conjunctiva, anicteric sclerae, moist oral mucosa.  Slight left-sided facial droop.  Tongue is midline.  He does have lesions to the tongue.  NECK: Supple, without any lymphadenopathy, or masses  CVS:  S1  S2, without murmur or gallop.   LUNG: Clear to auscultation, No wheezes, rales or rhonci.  Somewhat shallow respiratory effort.  Continues on O2 at 2 L nasal cannula.  No cough on exam.  BACK: No kyphosis of the thoracic spine  ABDOMEN: Soft, nontender to palpation, with  positive bowel sounds  EXTREMITIES: Unable to move left side.  SKIN: Warm and dry, no rashes or erythema noted  NEUROLOGIC: Intact, pulses palpable.  Left-sided hemiparesis.  PSYCHIATRIC: Cognition intact.  Attempts to make jokes.          Labs:    Labs reviewed in the record.    Assessment/Plan:  1. Acute CVA (cerebrovascular accident)     2. Left hemiparesis     3. Dysphasia     4. Coronary artery disease     5. Type 2 diabetes mellitus     6. Hypertension     7. Hyperlipidemia       Patient with acute CVA with left-sided hemiparesis.  Unable to move his left side.  Some expressive aphasia.  Dysfunction.  Continues in speech therapy.  However he denies difficulty.  He is on altered diet and thickened liquids.  Coronary artery disease.  His statin was restarted.  Type 2 diabetes.  His diabetic medication has been resumed.  We will continue to monitor his blood sugars.  Hypertension suspect controlled.  I will follow-up with laboratory including CBC BMP and BNP.  Patient continues on O2.  He was on this at home at night.  Will attempt to wean to his baseline.        35 minutes spent of which greater than 50% was face to face communication with the patient about above plan of care    Electronically signed by: Sarah Smith, JORGE LUIS

## 2021-06-16 NOTE — PROGRESS NOTES
[facility]      Facility:    [facility] [[id number]]  Code Status: UNKNOWN      Chief Complaint/Reason for Visit:  Chief Complaint   Patient presents with     H & P     Acute cerebrovascular accident with left-sided dense hemiparesis as well as dysphasia and aphasia.  Type 2 diabetes, alcohol use disorder, adjustment disorder with anxiety, dysarthria, hyperlipidemia, essential hypertension.       HPI:   Tariq is a 68 y.o. male who was recently admitted to the hospital on 2/15/2018.  He does have coronary disease and hypertension dyslipidemia as well as diabetes and COPD and is oxygen dependent at home.  He was admitted to the hospital on 213 with left-sided weakness and he woke up with it in the morning.  After 10 hours he did call family and drove him to the emergency room and CT showed subacute/chronic lacunar infarct in the right corona radiata.  CTs angiogram was done and showed intracranial atherosclerotic sclerosis and narrowing but no large vessel occlusion.  MRI also was done and showed right pontine infarct as well as small left temporal infarct and right corona radiata lacunar infarct.  MRA was significant for again multiple intracranial stenosis.  He did have severe cerebrovascular disease and he had not been taking his medications this include a statin at home he was not taking aspirin either.  He had not been taking his hypertensive medications or his diabetic medications.  During his hospitalization he did continue these medications and was put on aspirin 325 mg daily as also Plavix.  He was also placed on a statin and he did recover very little of his dense left hemiparesis.  He still has some dysphasia and in continues to work with speech therapy at this time.  He is max assist for bed mobility and assist for sit and stand.  He was treated appropriately and transferred here to the TCU at [facility] in stable condition.    Patient is not any pain  at this time he still maintains a left hemiparesis with very little movement of his left hand mostly the distal fingers.  He does have a facial droop on the left and he also is a phasic but can get his words out to answer questions.  He is currently going to speech therapy at this time.  He is on a thickened nectar thickened liquids at this time and is extremely pleasant and alert.  He can articulate his thoughts without any issues.  Also noted in the past medical history he has alcohol use disorder which is mild and adjustment disorder with anxiety.    Past Medical History:  Past Medical History:   Diagnosis Date     Abnormality of gait      Acute bronchospasm      Adjustment disorder with anxiety      Alcohol use disorder, mild, abuse      CAD (coronary artery disease)      COPD (chronic obstructive pulmonary disease)      CVA (cerebral vascular accident)      DM2 (diabetes mellitus, type 2)      Dysarthria      Dysphagia      HLD (hyperlipidemia)      HTN (hypertension)      Hyperglycemia      Left hemiparesis            Surgical History:  History reviewed. No pertinent surgical history.    Family History:   History reviewed. No pertinent family history.    Social History:    Social History     Social History     Marital status:      Spouse name: N/A     Number of children: N/A     Years of education: N/A     Social History Main Topics     Smoking status: Former Smoker     Smokeless tobacco: None     Alcohol use Yes     Drug use: None     Sexual activity: Not Asked     Other Topics Concern     None     Social History Narrative          Review of Systems   Constitutional:        Patient continues with his dense left hemiparesis as well as dysphasia and aphasia.  He has no pain fevers chills nausea vomiting diarrhea change in vision hearing taste or smell.  He still has left hemiparesis is noted but there is no chest pain.  He is short of breath on exertion and apparently has been on oxygen before he was  hospitalized secondary to his COPD.  The remainder the review of systems is negative.       Vitals:    03/12/18 1025   BP: 121/65   Pulse: 76   Resp: 16   Temp: 98.7  F (37.1  C)   SpO2: 96%       Physical Exam   Constitutional: He appears well-nourished. No distress.   HENT:   Head: Normocephalic and atraumatic.   Nose: Nose normal.   Mouth/Throat: No oropharyngeal exudate.   Eyes: Right eye exhibits no discharge. Left eye exhibits no discharge. No scleral icterus.   Neck: Neck supple. No thyromegaly present.   Cardiovascular: Normal rate and regular rhythm.    Pulmonary/Chest: Effort normal and breath sounds normal. No respiratory distress. He has no wheezes. He has no rales.   Abdominal: Soft. Bowel sounds are normal. He exhibits no distension. There is no tenderness.   Musculoskeletal: He exhibits no edema or tenderness.   Lymphadenopathy:     He has no cervical adenopathy.   Neurological: He is alert.   Patient has partial paralysis of the less of his left facial muscles but extraocular motion was intact and pupils were equal.  He does have dense left hemiparesis with slight movement of his left hands.  And left leg did not get any movement at all.  Right side is normal.   Skin: Skin is warm and dry. He is not diaphoretic.   Psychiatric: He has a normal mood and affect. His behavior is normal.       Medication List:  Current Outpatient Prescriptions   Medication Sig     albuterol (PROAIR HFA;PROVENTIL HFA;VENTOLIN HFA) 90 mcg/actuation inhaler Inhale 1-2 puffs every 4 (four) hours as needed for wheezing.     aspirin 325 MG EC tablet Take 325 mg by mouth daily.     atorvastatin (LIPITOR) 40 MG tablet Take 40 mg by mouth at bedtime.     clopidogrel (PLAVIX) 75 mg tablet Take 75 mg by mouth daily.     fluticasone-salmeterol (ADVAIR) 250-50 mcg/dose DISKUS Inhale 1 puff 2 (two) times a day.     glipiZIDE (GLUCOTROL XL) 10 MG 24 hr tablet Take 10 mg by mouth every evening. And 5 mg at noon     lisinopril  (PRINIVIL,ZESTRIL) 20 MG tablet Take 20 mg by mouth daily.     senna-docusate (SENNOSIDES-DOCUSATE SODIUM) 8.6-50 mg tablet Take 2 tablets by mouth 2 (two) times a day.     sitaGLIPtin (JANUVIA) 100 MG tablet Take 100 mg by mouth daily.       Labs:       Assessment:    ICD-10-CM    1. Acute CVA (cerebrovascular accident) I63.9    2. Left hemiparesis G81.94    3. Dysphasia R47.02    4. Type 2 diabetes mellitus E11.9    5. Hypertension I10    6. Coronary artery disease I25.10        Plan: Plan at this time will continue medications as ordered at this time and for his rehabilitation her left physical and occupational therapy as well as speech therapy.  He will be no changes in his diabetic medications I did review his blood sugars and they are running anywhere from 108 with the highest of 145.  We will likely check an A1c at some point here and his blood pressure is normotensive.  Goal is 140/90 or better and is 121/65 at this time.  His coronary disease is stable at this time and will continue on oxygen at this time as needed.  No other changes to care plan at this time.        Electronically signed by: Elver Murphy DO

## 2021-06-16 NOTE — PROGRESS NOTES
[facility]    Facility:   [facility] [[id number]]   Code Status: UNKNOWN      CHIEF COMPLAINT/REASON FOR VISIT:  Chief Complaint   Patient presents with     Problem Visit     Episode of epistaxis, nausea with emesis which is resolving, CVA with left-sided hemiparesis and dysphasia.       HISTORY:      HPI: Tariq is a 68 y.o. male who resides here at the [facility] secondary to multiple medical problems include left-sided hemiparesis as well as dysphasia secondary to a CVA.  I am asked to see him today secondary to yesterday he did have an episode all day of nausea and emesis and was described as coffee-ground emesis.  We do not have a hemoglobin at this time he was febrile at this time but this seems to all have resolved.  Today however he does have a nosebleed epistaxis this morning which has been going on.  It did stop I was they are examining him and however did continue again.  He is currently on Plavix and aspirin without any Coumadin.  He says his pain is under good control and is progressing as anticipated with physical occupational and speech therapy.    Patient does have nosebleeds often however we did get it to stop.  We do not have a Rhino Rocket here noted to have gauze packing but we did put tissues into his nose and we will examine him continually throughout the day.  He does not have any emesis at this time however I will work him up for that and he denies any other issues.    Past Medical History:   Diagnosis Date     Abnormality of gait      Acute bronchospasm      Adjustment disorder with anxiety      Alcohol use disorder, mild, abuse      CAD (coronary artery disease)      COPD (chronic obstructive pulmonary disease)      CVA (cerebral vascular accident)      DM2 (diabetes mellitus, type 2)      Dysarthria      Dysphagia      HLD (hyperlipidemia)      HTN (hypertension)      Hyperglycemia      Left hemiparesis              History reviewed.  No pertinent family history.  Social History     Social History     Marital status:      Spouse name: N/A     Number of children: N/A     Years of education: N/A     Social History Main Topics     Smoking status: Unknown If Ever Smoked     Smokeless tobacco: None     Alcohol use Yes     Drug use: None     Sexual activity: Not Asked     Other Topics Concern     None     Social History Narrative         Review of Systems   Constitutional:        Positive for epistaxis and nausea and vomiting yesterday which has resolved.  He has no fevers chills chest pain shortness of breath.  He does have left-sided hemiparesis and some dysphasia which continues and the remainder the review of systems is negative.  He is moving his bowels without difficulty.       .  Vitals:    03/26/18 1006   BP: 135/67   Pulse: 81   Resp: 16   Temp: 98.6  F (37  C)   SpO2: 95%       Physical Exam   Constitutional: No distress.   HENT:   The right nasal passage did have some dried blood in it but I could not see any active hemorrhage.   Eyes: Conjunctivae are normal. Left eye exhibits no discharge. No scleral icterus.   Cardiovascular: Normal rate and regular rhythm.    Pulmonary/Chest: Effort normal and breath sounds normal. No respiratory distress. He has no wheezes.   Abdominal: Soft. Bowel sounds are normal. He exhibits no distension. There is no tenderness.   Musculoskeletal: He exhibits no edema.   Skin: He is not diaphoretic.         LABS:       ASSESSMENT:      ICD-10-CM    1. Epistaxis R04.0    2. Nausea and vomiting R11.2    3. Diarrhea R19.7    4. Dysphasia due to cerebrovascular disease I69.921        PLAN: Plan at this time CBC with differential secondary to bleeding and fever.  INR be done today secondary to bleeding and we will continue to monitor him.  We do not have a Rhino Rocket but we did pack his nose and I am aware that he is not on Coumadin however we will do an INR to examine his anticoagulation.  We will continue  with physical and Occupational Therapy today and no other changes to care plan at this time.      Total  minutes of which % was spent counseling and coordination of care of the above plan.    Electronically signed by: Elver Murphy DO

## 2021-06-17 NOTE — PROGRESS NOTES
Code Status:  POLST AVAILABLE  Visit Type: Problem Visit     Facility:  [facility] [[id number]]         Facility Type: SNF (Skilled Nursing Facility, TCU)    History of Present Illness: Tariq Pinzon is a 68 y.o. male whom I am seeing today for follow-up on the TCU.  Patient recently hospitalized on 2/15/2018.  He presented to the hospital with left-sided weakness and CT scan showed a subacute/chronic cortical infarct in the right coronary radiata.  CT angiogram was done and showed intracranial atherosclerotic sclerosis and narrowing but no large vessel occlusion.  An MRI showed right pontine infarct as well as small left temporal infarct and right coronary radiata coronary infarct.  He has a history of cerebrovascular disease and had not been taking his medications at home.  He has underlying diabetes.  Poor management.  Underlying COPD hypertension and dyslipidemia.  He is oxygen dependent at home however  he tells me he only wears this at night.  He has left dense hemiparesis.  Some dysphagia as well as aphasia.  He continues in therapy including speech therapy.  No movement on the left.  History of alcohol abuse as well as adjustment disorder with anxiety.     Today patient sitting up in bed.  He occasionally uses oxygen.  Today it is sitting above his nose.  Continues with wet coarse cough.  Lungs are clear.  Afebrile.  He has completed his antibiotics.  I did recently start him on omeprazole secondary to some nausea and vomiting.  No further symptoms.  I did obtain an abdominal x-ray which was negative.  He is having regular bowel movements.      Active Ambulatory Problems     Diagnosis Date Noted     CVA (cerebral vascular accident) 03/19/2018     Left hemiparesis 03/19/2018     Hypertension 03/19/2018     Dysarthria 03/19/2018     Dysphagia 03/19/2018     Diabetes mellitus 03/22/2018     Resolved Ambulatory Problems     Diagnosis Date Noted     No Resolved Ambulatory Problems     Past  Medical History:   Diagnosis Date     Abnormality of gait      Acute bronchospasm      Adjustment disorder with anxiety      Alcohol use disorder, mild, abuse      CAD (coronary artery disease)      COPD (chronic obstructive pulmonary disease)      CVA (cerebral vascular accident)      DM2 (diabetes mellitus, type 2)      Dysarthria      Dysphagia      HLD (hyperlipidemia)      HTN (hypertension)      Hyperglycemia      Left hemiparesis        Current Outpatient Prescriptions   Medication Sig     albuterol (PROAIR HFA;PROVENTIL HFA;VENTOLIN HFA) 90 mcg/actuation inhaler Inhale 1-2 puffs every 4 (four) hours as needed for wheezing.     aspirin 325 MG EC tablet Take 325 mg by mouth daily.     atorvastatin (LIPITOR) 40 MG tablet Take 40 mg by mouth at bedtime.     clopidogrel (PLAVIX) 75 mg tablet Take 75 mg by mouth daily.     fluticasone-salmeterol (ADVAIR) 250-50 mcg/dose DISKUS Inhale 1 puff 2 (two) times a day.     glipiZIDE (GLUCOTROL XL) 10 MG 24 hr tablet Take 10 mg by mouth every evening. And 5 mg at noon     lisinopril (PRINIVIL,ZESTRIL) 20 MG tablet Take 20 mg by mouth daily.     senna-docusate (SENNOSIDES-DOCUSATE SODIUM) 8.6-50 mg tablet Take 2 tablets by mouth 2 (two) times a day.     sitaGLIPtin (JANUVIA) 100 MG tablet Take 100 mg by mouth daily.       No Known Allergies      Review of Systems   Refer to HPI otherwise negative.          Physical Exam   PHYSICAL EXAMINATION:  Vital signs:   Vitals:    04/05/18 2148   BP: 111/71   Pulse: 80   Resp: 16   Temp: 98.5  F (36.9  C)   SpO2: 96%     General: Awake, Alert, oriented x3, appropriately, follows simple commands, conversant  HEENT:PERRLA, Pink conjunctiva, anicteric sclerae, moist oral mucosa.  Slight left-sided facial droop.  Tongue is midline.  Mouth ulcers improved. Nasal congestion greatly improved.   NECK: Supple, with mild  lymphadenopathy  CVS:  S1  S2, without murmur or gallop.  Regular rate and rhythm on exam.  LUNG: Clear to auscultation, No  wheezes, rales or rhonci.  Continues with wet coare cough.   BACK: No kyphosis of the thoracic spine  ABDOMEN: Soft, nontender to palpation, with positive bowel sounds  EXTREMITIES:   No LE edema. SKIN: Warm and dry, no rashes or erythema noted  NEUROLOGIC: Intact, pulses palpable.  Left-sided hemiparesis.  PSYCHIATRIC: Cognition intact.         Assessment/Plan:  1. CVA (cerebral vascular accident)     2. 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 Left hemiparesis     4. GERD (gastroesophageal reflux disease)     5. At risk for aspiration     6. Hypertension     7. Coronary artery disease     8. Diabetes mellitus         S/P CVA with left-sided hemiparesis.  Recent nausea and vomiting. Nursing staff report GERDS.  I did start him on omeprazole 20 mg daily.  No further nausea or emesis.  X-ray obtained.  Negative for any acute findings.  He does continue with wet nonproductive cough.  He is on Mucinex.  Lung sounds clear to auscultate.  Occasional O2 use for comfort.  Hypertension.  Satisfactory controlled.  No further tachycardia.  Diabetes.  Controlled with oral hypoglycemics.  Coronary artery disease.  No chest pain.      Electronically signed by: Sarah Smith, CNP

## 2021-06-17 NOTE — PROGRESS NOTES
Code Status:  POLST AVAILABLE  Visit Type: Problem Visit     Facility:  [facility] [[id number]]         Facility Type: SNF (Skilled Nursing Facility, TCU)    History of Present Illness: Tariq Pinzon is a 68 y.o. male whom I am seeing today for follow-up on the TCU.  Patient recently hospitalized on 2/15/2018.  He presented to the hospital with left-sided weakness and CT scan showed a subacute/chronic cortical infarct in the right coronary radiata.  CT angiogram was done and showed intracranial atherosclerotic sclerosis and narrowing but no large vessel occlusion.  An MRI showed right pontine infarct as well as small left temporal infarct and right coronary radiata coronary infarct.  He has a history of cerebrovascular disease and had not been taking his medications at home.  He has underlying diabetes.  Poor management.  Underlying COPD hypertension and dyslipidemia.  He is oxygen dependent at home however  he tells me he only wears this at night.  He has left dense hemiparesis.  Some dysphagia as well as aphasia.  He continues in therapy including speech therapy.  No movement on the left.  History of alcohol abuse as well as adjustment disorder with anxiety.     Today patient sitting up in wheelchair.  Was recently started back on low-dose prednisone.  Cough somewhat improved but continues.  He is on leave and asked for thinning of secretions.  Continues on oxygen.  He was on oxygen at home.  He is having some increased movement of the left extremities.  Tolerating therapy well.  Denies any pain.        Active Ambulatory Problems     Diagnosis Date Noted     CVA (cerebral vascular accident) 03/19/2018     Left hemiparesis 03/19/2018     Hypertension 03/19/2018     Dysarthria 03/19/2018     Dysphagia 03/19/2018     Diabetes mellitus 03/22/2018     COPD (chronic obstructive pulmonary disease) 04/16/2018     Resolved Ambulatory Problems     Diagnosis Date Noted     No Resolved Ambulatory Problems      Past Medical History:   Diagnosis Date     Abnormality of gait      Acute bronchospasm      Adjustment disorder with anxiety      Alcohol use disorder, mild, abuse      CAD (coronary artery disease)      COPD (chronic obstructive pulmonary disease)      CVA (cerebral vascular accident)      DM2 (diabetes mellitus, type 2)      Dysarthria      Dysphagia      HLD (hyperlipidemia)      HTN (hypertension)      Hyperglycemia      Left hemiparesis        Current Outpatient Prescriptions   Medication Sig     albuterol (PROAIR HFA;PROVENTIL HFA;VENTOLIN HFA) 90 mcg/actuation inhaler Inhale 1-2 puffs every 4 (four) hours as needed for wheezing.     aspirin 325 MG EC tablet Take 325 mg by mouth daily.     atorvastatin (LIPITOR) 40 MG tablet Take 40 mg by mouth at bedtime.     clopidogrel (PLAVIX) 75 mg tablet Take 75 mg by mouth daily.     fluticasone-salmeterol (ADVAIR) 250-50 mcg/dose DISKUS Inhale 1 puff 2 (two) times a day.     glipiZIDE (GLUCOTROL XL) 10 MG 24 hr tablet Take 10 mg by mouth every evening. And 5 mg at noon     lisinopril (PRINIVIL,ZESTRIL) 20 MG tablet Take 20 mg by mouth daily.     senna-docusate (SENNOSIDES-DOCUSATE SODIUM) 8.6-50 mg tablet Take 2 tablets by mouth 2 (two) times a day.     sitaGLIPtin (JANUVIA) 100 MG tablet Take 100 mg by mouth daily.       No Known Allergies      Review of Systems   No fevers or chills. No headache, lightheadedness or dizziness. Continues  SOB, wet non productive cough. No chest pains or palpitations. Appetite is good.Denies any dysphagia.  No nausea, vomiting, constipation or diarrhea.  No dysuria.    Physical Exam   PHYSICAL EXAMINATION:  Vital signs:  /74, heart rate 76, respirations 20, temperature 97.6, 97% on room air.  Current weight 154 pounds.  General: Awake, Alert, oriented x3, appropriately, follows simple commands, conversant  HEENT:PERRLA, Pink conjunctiva, anicteric sclerae, moist oral mucosa.  Slight left-sided facial droop.  Tongue is midline.     NECK: Supple, with  No lymphadenopathy  CVS:  S1  S2, without murmur or gallop.  Regular rate and rhythm on exam.  LUNG: Wet non productive cough. Coughing spells at times with difficulty clearing phelgm. Lungs CTA. O2 on @ 2L NC.   BACK: No kyphosis of the thoracic spine  ABDOMEN: Soft, nontender to palpation, with positive bowel sounds  : No irritation at penial tip. No blood noted in pad.   EXTREMITIES:   No LE edema.  He is able to move his left lower extremity kicked his foot out.  Move up and down.  Increased movement in the left hand and fingers.  Grasp present.  Some difficulty with overhead raise.   SKIN: Warm and dry.  Scabbed areas to right lower extremity.  Some bruising surrounding.  NEUROLOGIC: Intact, pulses palpable  PSYCHIATRIC: Cognition intact.         Assessment/Plan:  1. CVA (cerebral vascular accident)     2. Left hemiparesis     3. COPD (chronic obstructive pulmonary disease)     4. Dysphagia     5. Hypertension     6. Diabetes mellitus       S/P CVA with left-sided hemiparesis.  Continues in therapy making progress.  COPD.  He is oxygen dependent at home.  Continues on O2 at 2 L.  Continues with wet nonproductive cough.  Afebrile.  Is on low-dose steroids.  Continues on nebulizer treatments 4 times daily.  Also Mucinex to thin secretions.  Does have a history of dysphagia however was cleared by speech therapy for regular diet and thin liquids.  Hypertension satisfactory controlled.  Diabetes.  Occasional elevated blood sugars.  We will continue to adjust insulin.  Reports loose stools.  Will decrease to insomnia.      Electronically signed by: Sarah Smith CNP

## 2021-06-17 NOTE — PROGRESS NOTES
Code Status:  POLST AVAILABLE  Visit Type: Problem Visit     Facility:  [facility] [[id number]]         Facility Type: SNF (Skilled Nursing Facility, TCU)    History of Present Illness: Tariq Pinzon is a 68 y.o. male whom I am seeing today for follow-up on the TCU.  Patient recently hospitalized on 2/15/2018.  He presented to the hospital with left-sided weakness and CT scan showed a subacute/chronic cortical infarct in the right coronary radiata.  CT angiogram was done and showed intracranial atherosclerotic sclerosis and narrowing but no large vessel occlusion.  An MRI showed right pontine infarct as well as small left temporal infarct and right coronary radiata coronary infarct.  He has a history of cerebrovascular disease and had not been taking his medications at home.  He has underlying diabetes.  Poor management.  Underlying COPD hypertension and dyslipidemia.  He is oxygen dependent at home however  he tells me he only wears this at night.  He has left dense hemiparesis.  Some dysphagia as well as aphasia.  He continues in therapy including speech therapy.  No movement on the left.  History of alcohol abuse as well as adjustment disorder with anxiety.     Today patient sitting up in wheelchair.  Continues with some increased movement in the left side.  Reports loose stools subsided since changing his stool softeners to as needed.  Continues on O2 at 2 L.  Underlying COPD.  Continues with wet cough.  However lung sounds are clear today.      Active Ambulatory Problems     Diagnosis Date Noted     CVA (cerebral vascular accident) 03/19/2018     Left hemiparesis 03/19/2018     Hypertension 03/19/2018     Dysarthria 03/19/2018     Dysphagia 03/19/2018     Diabetes mellitus 03/22/2018     COPD (chronic obstructive pulmonary disease) 04/16/2018     Resolved Ambulatory Problems     Diagnosis Date Noted     No Resolved Ambulatory Problems     Past Medical History:   Diagnosis Date      Abnormality of gait      Acute bronchospasm      Adjustment disorder with anxiety      Alcohol use disorder, mild, abuse      CAD (coronary artery disease)      COPD (chronic obstructive pulmonary disease)      CVA (cerebral vascular accident)      DM2 (diabetes mellitus, type 2)      Dysarthria      Dysphagia      HLD (hyperlipidemia)      HTN (hypertension)      Hyperglycemia      Left hemiparesis        Current Outpatient Prescriptions   Medication Sig     albuterol (PROAIR HFA;PROVENTIL HFA;VENTOLIN HFA) 90 mcg/actuation inhaler Inhale 1-2 puffs every 4 (four) hours as needed for wheezing.     aspirin 325 MG EC tablet Take 325 mg by mouth daily.     atorvastatin (LIPITOR) 40 MG tablet Take 40 mg by mouth at bedtime.     clopidogrel (PLAVIX) 75 mg tablet Take 75 mg by mouth daily.     fluticasone-salmeterol (ADVAIR) 250-50 mcg/dose DISKUS Inhale 1 puff 2 (two) times a day.     glipiZIDE (GLUCOTROL XL) 10 MG 24 hr tablet Take 10 mg by mouth every evening. And 5 mg at noon     lisinopril (PRINIVIL,ZESTRIL) 20 MG tablet Take 20 mg by mouth daily.     senna-docusate (SENNOSIDES-DOCUSATE SODIUM) 8.6-50 mg tablet Take 2 tablets by mouth 2 (two) times a day.     sitaGLIPtin (JANUVIA) 100 MG tablet Take 100 mg by mouth daily.       No Known Allergies      Review of Systems   No fevers or chills. No headache, lightheadedness or dizziness. Continues  SOB, wet non productive cough. No chest pains or palpitations. Appetite is good.Denies any dysphagia.  No nausea, vomiting, constipation or diarrhea.  Loose stools improved with decreasing stool softener.  No dysuria.    Physical Exam   PHYSICAL EXAMINATION:  Vital signs:  /75, heart rate 62, respirations 20, temperature 97.9, 97% on room air 154 pounds.  General: Awake, Alert, oriented x3, appropriately, follows simple commands, conversant  HEENT:PERRLA, Pink conjunctiva, anicteric sclerae, moist oral mucosa.  Slight left-sided facial droop.  Tongue is midline.    NECK:  Supple, with  No lymphadenopathy  CVS:  S1  S2, without murmur or gallop.  Regular rate and rhythm on exam.  LUNG: Wet non productive cough. Lungs CTA.  No wheezes rales or rhonchi.  O2 on @ 2L NC.   BACK: No kyphosis of the thoracic spine  ABDOMEN: Soft, nontender to palpation, with positive bowel sounds  : No irritation at penial tip. No blood noted in pad.   EXTREMITIES:   No LE edema.  He is able to move his left lower extremity kicked his foot out.  Move up and down.  Increased movement in the left hand and fingers.  Grasp present.  Some difficulty with overhead raise.   SKIN: Warm and dry.  Scabbed areas to right lower extremity.  Some bruising surrounding.  NEUROLOGIC: Intact, pulses palpable  PSYCHIATRIC: Cognition intact.         Assessment/Plan:  1. CVA (cerebral vascular accident)     2. Left hemiparesis     3. COPD (chronic obstructive pulmonary disease)     4. Hypertension           S/P CVA with left-sided hemiparesis.  Continues with increased movement in the left side.  COPD.  Lung sounds clear.  Cough improving with the use of nebulizers and Mucinex.  He also continues on low-dose prednisone for maintenance.  O2 at 2 L nasal cannula.  Hypertension.  Suspect controlled.  Loose stools improved with decrease in his stool softener..      Electronically signed by: aSrah Smith, CNP

## 2021-06-17 NOTE — PROGRESS NOTES
Code Status:  POLST AVAILABLE  Visit Type: Problem Visit     Facility:  [facility] [[id number]]         Facility Type: SNF (Skilled Nursing Facility, TCU)    History of Present Illness: Tariq Pinzon is a 68 y.o. male whom I am seeing today for follow-up on the TCU.  Patient recently hospitalized on 2/15/2018.  He presented to the hospital with left-sided weakness and CT scan showed a subacute/chronic cortical infarct in the right coronary radiata.  CT angiogram was done and showed intracranial atherosclerotic sclerosis and narrowing but no large vessel occlusion.  An MRI showed right pontine infarct as well as small left temporal infarct and right coronary radiata coronary infarct.  He has a history of cerebrovascular disease and had not been taking his medications at home.  He has underlying diabetes.  Poor management.  Underlying COPD hypertension and dyslipidemia.  He is oxygen dependent at home however  he tells me he only wears this at night.  He has left dense hemiparesis.  Some dysphagia as well as aphasia.  He continues in therapy including speech therapy.  No movement on the left.  History of alcohol abuse as well as adjustment disorder with anxiety.     Today patient sitting up at the side of the bed.  Patient with recent nosebleed.  No further nosebleeds.  He did have nausea and vomiting over the weekend which has subsided.  No abdominal discomfort.  He did have a low-grade temp on previous visit.  He had nasal congestion and wet nonproductive cough.  UA UC was obtained.  UC showed no growth.  He was started on Levaquin prior.  He continues on this.  Coughing up thick yellow sputum.  He does have history of dysphagia.  Will continue Levaquin.  Possible aspiration risk with recent vomiting.  Heart rate slightly tacky despite being on antibiotics.  Will obtain EKG.  Normal rhythm on exam.      Active Ambulatory Problems     Diagnosis Date Noted     CVA (cerebral vascular accident)  03/19/2018     Left hemiparesis 03/19/2018     Hypertension 03/19/2018     Dysarthria 03/19/2018     Dysphagia 03/19/2018     Diabetes mellitus 03/22/2018     Resolved Ambulatory Problems     Diagnosis Date Noted     No Resolved Ambulatory Problems     Past Medical History:   Diagnosis Date     Abnormality of gait      Acute bronchospasm      Adjustment disorder with anxiety      Alcohol use disorder, mild, abuse      CAD (coronary artery disease)      COPD (chronic obstructive pulmonary disease)      CVA (cerebral vascular accident)      DM2 (diabetes mellitus, type 2)      Dysarthria      Dysphagia      HLD (hyperlipidemia)      HTN (hypertension)      Hyperglycemia      Left hemiparesis        Current Outpatient Prescriptions   Medication Sig     albuterol (PROAIR HFA;PROVENTIL HFA;VENTOLIN HFA) 90 mcg/actuation inhaler Inhale 1-2 puffs every 4 (four) hours as needed for wheezing.     aspirin 325 MG EC tablet Take 325 mg by mouth daily.     atorvastatin (LIPITOR) 40 MG tablet Take 40 mg by mouth at bedtime.     clopidogrel (PLAVIX) 75 mg tablet Take 75 mg by mouth daily.     fluticasone-salmeterol (ADVAIR) 250-50 mcg/dose DISKUS Inhale 1 puff 2 (two) times a day.     glipiZIDE (GLUCOTROL XL) 10 MG 24 hr tablet Take 10 mg by mouth every evening. And 5 mg at noon     lisinopril (PRINIVIL,ZESTRIL) 20 MG tablet Take 20 mg by mouth daily.     senna-docusate (SENNOSIDES-DOCUSATE SODIUM) 8.6-50 mg tablet Take 2 tablets by mouth 2 (two) times a day.     sitaGLIPtin (JANUVIA) 100 MG tablet Take 100 mg by mouth daily.       No Known Allergies      Review of Systems   Refer to HPI otherwise negative.          Physical Exam   PHYSICAL EXAMINATION:  Vital signs:   Vitals:    03/29/18 2245   BP: 130/69   Pulse: (!) 104   Resp: 22   Temp: 98.2  F (36.8  C)   SpO2: 94%     General: Awake, Alert, oriented x3, appropriately, follows simple commands, conversant  HEENT:PERRLA, Pink conjunctiva, anicteric sclerae, moist oral mucosa.   Slight left-sided facial droop.  Tongue is midline.  He does have lesions to the tongue which are improving.  Back of throat slightly pink.  Nasal congestion heard.  Denies pain over the maxillary and frontal sinuses.  NECK: Supple, with mild  lymphadenopathy  CVS:  S1  S2, without murmur or gallop.  Regular rate and rhythm on exam.  LUNG: Clear to auscultation, No wheezes, rales or rhonci.  Somewhat shallow respiratory effort.  Wet productive cough producing thick yellow sputum.  No dyspnea at rest.  BACK: No kyphosis of the thoracic spine  ABDOMEN: Soft, nontender to palpation, with positive bowel sounds  EXTREMITIES: Moves all extremities with generalized weakness.  Trace lower extremity edema.  SKIN: Warm and dry, no rashes or erythema noted  NEUROLOGIC: Intact, pulses palpable.  Left-sided hemiparesis.  PSYCHIATRIC: Cognition intact.  Attempts to make jokes.          Labs:    Recent Results (from the past 240 hour(s))   C. Diff Toxin By PCR   Result Value Ref Range    C.Difficile Toxigenic by PCR Negative Negative    Ribotype 027/NAP1/B1 Presumptive Negative Presumptive Negative   Hemoglobin   Result Value Ref Range    Hemoglobin 11.3 (L) 14.0 - 18.0 g/dL   HM2(CBC w/o Differential)   Result Value Ref Range    WBC 10.2 4.0 - 11.0 thou/uL    RBC 3.76 (L) 4.40 - 6.20 mill/uL    Hemoglobin 12.0 (L) 14.0 - 18.0 g/dL    Hematocrit 35.2 (L) 40.0 - 54.0 %    MCV 94 80 - 100 fL    MCH 31.9 27.0 - 34.0 pg    MCHC 34.1 32.0 - 36.0 g/dL    RDW 12.6 11.0 - 14.5 %    Platelets 307 140 - 440 thou/uL    MPV 10.2 8.5 - 12.5 fL   Automated Differential   Result Value Ref Range    Neutrophils % 79 (H) 50 - 70 %    Lymphocytes % 9 (L) 20 - 40 %    Monocytes % 7 2 - 10 %    Eosinophils % 4 0 - 6 %    Basophils % 1 0 - 2 %    Neutrophils Absolute 8.2 (H) 2.0 - 7.7 thou/uL    Lymphocytes Absolute 0.9 0.8 - 4.4 thou/uL    Monocytes Absolute 0.8 0.0 - 0.9 thou/uL    Eosinophils Absolute 0.4 0.0 - 0.4 thou/uL    Basophils Absolute 0.1  0.0 - 0.2 thou/uL   Basic Metabolic Panel   Result Value Ref Range    Sodium 141 136 - 145 mmol/L    Potassium 4.1 3.5 - 5.0 mmol/L    Chloride 101 98 - 107 mmol/L    CO2 24 22 - 31 mmol/L    Anion Gap, Calculation 16 5 - 18 mmol/L    Glucose 67 (L) 70 - 125 mg/dL    Calcium 9.6 8.5 - 10.5 mg/dL    BUN 18 8 - 22 mg/dL    Creatinine 0.79 0.70 - 1.30 mg/dL    GFR MDRD Af Amer >60 >60 mL/min/1.73m2    GFR MDRD Non Af Amer >60 >60 mL/min/1.73m2   BNP(B-type Natriuretic Peptide)   Result Value Ref Range    BNP <10 0 - 65 pg/mL   Urinalysis   Result Value Ref Range    Color, UA Pema (!) Colorless, Yellow, Straw, Light Yellow    Clarity, UA Cloudy (!) Clear    Glucose, UA Negative Negative    Bilirubin, UA Negative Negative    Ketones, UA 25 mg/dL (!) Negative    Specific Gravity, UA 1.030 1.001 - 1.030    Blood, UA Negative Negative    pH, UA 6.0 4.5 - 8.0    Protein, UA >=500 mg/dL (!) Negative mg/dL    Urobilinogen, UA <2.0 E.U./dL <2.0 E.U./dL, 2.0 E.U./dL    Nitrite, UA Negative Negative    Leukocytes, UA Negative Negative    Bacteria, UA None Seen None Seen hpf    RBC, UA 0-2 None Seen, 0-2 hpf    WBC, UA 0-5 None Seen, 0-5 hpf    Squam Epithel, UA 0-5 None Seen, 0-5 lpf    WBC Clumps None Seen None Seen    Amorphous, UA Many (!) None Seen    Mucus, UA Many (!) None Seen lpf    Ca Oxalate Jackeline, UA Present (!) None Seen    Hyaline Casts, UA 0-5 0-5, None Seen lpf   Culture, Urine   Result Value Ref Range    Culture No Growth          Assessment/Plan:  1. Tachycardia     2. CVA (cerebral vascular accident)     3. 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 Cough     5. At risk for aspiration     6. Diabetes mellitus     7. Coronary artery disease     8. Hypertension     9. Left hemiparesis       Patient with recent CVA with left-sided hemiparesis.  Dysphagia.  Continues with speech therapy.  He had nausea and vomiting over the weekend.  No further nausea or vomiting.  He is at risk for aspiration secondary to his  nausea.  Has a very wet nonproductive cough.  Previous nasal congestion and mucus present on the leg.  He was started on Levaquin.  He is afebrile.  No elevated white count.  We will continue given his recent nausea with vomiting and aspiration with wrist now with wet cough.  Tachycardia continues.  Will obtain EKG. I do not have a comparison on file.  He does have underlying CAD.  May benefit from beta-blocker.  Diabetes satisfactory controlled.  Hypertension satisfactory controlled.        Electronically signed by: Sarah Smith CNP

## 2021-06-17 NOTE — PROGRESS NOTES
Code Status:  POLST AVAILABLE  Visit Type: Problem Visit     Facility:  [facility] [[id number]]         Facility Type: SNF (Skilled Nursing Facility, TCU)    History of Present Illness: Tariq Pinzon is a 68 y.o. male whom I am seeing today for follow-up on the TCU.  Patient recently hospitalized on 2/15/2018.  He presented to the hospital with left-sided weakness and CT scan showed a subacute/chronic cortical infarct in the right coronary radiata.  CT angiogram was done and showed intracranial atherosclerotic sclerosis and narrowing but no large vessel occlusion.  An MRI showed right pontine infarct as well as small left temporal infarct and right coronary radiata coronary infarct.  He has a history of cerebrovascular disease and had not been taking his medications at home.  He has underlying diabetes.  Poor management.  Underlying COPD hypertension and dyslipidemia.  He is oxygen dependent at home however  he tells me he only wears this at night.  He has left dense hemiparesis.  Some dysphagia as well as aphasia.  He continues in therapy including speech therapy.  No movement on the left.  History of alcohol abuse as well as adjustment disorder with anxiety.     Today patient sitting up in wheelchair. He continues on Oxygen @ 2L.  He continues with a very wet nonproductive cough.  Expiratory wheezing noted.  Continues with some shortness of breath with exertion.  Denies any dysphagia.  Appetite is good.  Strength is improving.  He is able to raise his left lower extremity.  Increased movement in the fingers with increased motor movement.  He continues on speech therapy.  Alert and oriented.  Aphasia resolving.    Active Ambulatory Problems     Diagnosis Date Noted     CVA (cerebral vascular accident) 03/19/2018     Left hemiparesis 03/19/2018     Hypertension 03/19/2018     Dysarthria 03/19/2018     Dysphagia 03/19/2018     Diabetes mellitus 03/22/2018     COPD (chronic obstructive pulmonary  disease) 04/16/2018     Resolved Ambulatory Problems     Diagnosis Date Noted     No Resolved Ambulatory Problems     Past Medical History:   Diagnosis Date     Abnormality of gait      Acute bronchospasm      Adjustment disorder with anxiety      Alcohol use disorder, mild, abuse      CAD (coronary artery disease)      COPD (chronic obstructive pulmonary disease)      CVA (cerebral vascular accident)      DM2 (diabetes mellitus, type 2)      Dysarthria      Dysphagia      HLD (hyperlipidemia)      HTN (hypertension)      Hyperglycemia      Left hemiparesis        Current Outpatient Prescriptions   Medication Sig     albuterol (PROAIR HFA;PROVENTIL HFA;VENTOLIN HFA) 90 mcg/actuation inhaler Inhale 1-2 puffs every 4 (four) hours as needed for wheezing.     aspirin 325 MG EC tablet Take 325 mg by mouth daily.     atorvastatin (LIPITOR) 40 MG tablet Take 40 mg by mouth at bedtime.     clopidogrel (PLAVIX) 75 mg tablet Take 75 mg by mouth daily.     fluticasone-salmeterol (ADVAIR) 250-50 mcg/dose DISKUS Inhale 1 puff 2 (two) times a day.     glipiZIDE (GLUCOTROL XL) 10 MG 24 hr tablet Take 10 mg by mouth every evening. And 5 mg at noon     lisinopril (PRINIVIL,ZESTRIL) 20 MG tablet Take 20 mg by mouth daily.     senna-docusate (SENNOSIDES-DOCUSATE SODIUM) 8.6-50 mg tablet Take 2 tablets by mouth 2 (two) times a day.     sitaGLIPtin (JANUVIA) 100 MG tablet Take 100 mg by mouth daily.       No Known Allergies      Review of Systems   No fevers or chills. No headache, lightheadedness or dizziness. Continues  SOB, wet non productive cough. No chest pains or palpitations. Appetite is good.Denies any dysphagia.  No nausea, vomiting, constipation or diarrhea.  Concerned that his legs are not healing enough.  He understands that it is due to blood thinners and previous scratching.    Physical Exam   PHYSICAL EXAMINATION:  Vital signs:  /66, 95%, P62, R20, T97.5, 154 pounds  General: Awake, Alert, oriented x3,  appropriately, follows simple commands, conversant  HEENT:PERRLA, Pink conjunctiva, anicteric sclerae, moist oral mucosa.  Slight left-sided facial droop.  Tongue is midline.    NECK: Supple, with  No lymphadenopathy  CVS:  S1  S2, without murmur or gallop.  Regular rate and rhythm on exam.  LUNG: Wet non productive cough. Rhonchi throughout with expiratory wheezing.   BACK: No kyphosis of the thoracic spine  ABDOMEN: Soft, nontender to palpation, with positive bowel sounds  : No irritation at penial tip. No blood noted in pad.   EXTREMITIES:   No LE edema.  He is able to move his left lower extremity kicked his foot out.  Move up and down.  Increased movement in the left hand and fingers.  Grasp present.  Some difficulty with overhead raise.   SKIN: Warm and dry.  Scabbed areas to right lower extremity.  Some bruising surrounding.  NEUROLOGIC: Intact, pulses palpable.ove his LLE.  PSYCHIATRIC: Cognition intact.         Assessment/Plan:  1. CVA (cerebral vascular accident)     2. Left hemiparesis     3. COPD (chronic obstructive pulmonary disease)     4. Diabetes mellitus     5. Cough         S/P CVA with left-sided hemiparesis. He continues to gain strength in his left extremities.  He denies any dyspnea.  He is on regular diet.  Continues with speech therapy.  Continues with wet nonproductive cough.  He continues on prednisone 5 mg daily.  He does have a history of COPD.  He is receiving nebulizers 3 times daily.  Cough seemed to improved on larger dose steroids.  We will treat him again with a burst of steroids.  He does have expiratory wheezing today.  Continues on O2 at 2 L nasal cannula.  Diabetes.  Satisfactory controlled.  Will need to monitor with prednisone use.    Electronically signed by: Sarah Smith, CNP

## 2021-06-17 NOTE — PROGRESS NOTES
Code Status:  POLST AVAILABLE  Visit Type: Problem Visit     Facility:  [facility] [[id number]]         Facility Type: SNF (Skilled Nursing Facility, TCU)    History of Present Illness: Tariq Pinzon is a 68 y.o. male whom I am seeing today for follow-up on the TCU.  Patient recently hospitalized on 2/15/2018.  He presented to the hospital with left-sided weakness and CT scan showed a subacute/chronic cortical infarct in the right coronary radiata.  CT angiogram was done and showed intracranial atherosclerotic sclerosis and narrowing but no large vessel occlusion.  An MRI showed right pontine infarct as well as small left temporal infarct and right coronary radiata coronary infarct.  He has a history of cerebrovascular disease and had not been taking his medications at home.  He has underlying diabetes.  Poor management.  Underlying COPD hypertension and dyslipidemia.  He is oxygen dependent at home however  he tells me he only wears this at night.  He has left dense hemiparesis.  Some dysphagia as well as aphasia.  He continues in therapy including speech therapy.  No movement on the left.  History of alcohol abuse as well as adjustment disorder with anxiety.     Today patient sitting up in wheelchair.  He continues on O2 at 2 L.  I did resume low-dose prednisone.  Today he has coughing spells unable to clear sputum.  He continues on nebs 4 times daily.  He denies any dysphagia.  He has been followed by speech.  He is on a regular diet.  Thin liquids.  Lung sounds are clear.  He is afebrile.  Will attempt to use some Mucinex for thinning.  Patient had an episode last evening of bleeding from his penis.  Denies any dysuria.  A UA UC was obtained which was negative.  He has had no further bleeding.  She does continue on both aspirin and Plavix.  Denies any abdominal discomfort.  Hemoglobin was obtained which is stable in the 11's.  I did speak with both him and his daughter in regards to this.  No  history of kidney stones.  No nausea.        Active Ambulatory Problems     Diagnosis Date Noted     CVA (cerebral vascular accident) 03/19/2018     Left hemiparesis 03/19/2018     Hypertension 03/19/2018     Dysarthria 03/19/2018     Dysphagia 03/19/2018     Diabetes mellitus 03/22/2018     COPD (chronic obstructive pulmonary disease) 04/16/2018     Resolved Ambulatory Problems     Diagnosis Date Noted     No Resolved Ambulatory Problems     Past Medical History:   Diagnosis Date     Abnormality of gait      Acute bronchospasm      Adjustment disorder with anxiety      Alcohol use disorder, mild, abuse      CAD (coronary artery disease)      COPD (chronic obstructive pulmonary disease)      CVA (cerebral vascular accident)      DM2 (diabetes mellitus, type 2)      Dysarthria      Dysphagia      HLD (hyperlipidemia)      HTN (hypertension)      Hyperglycemia      Left hemiparesis        Current Outpatient Prescriptions   Medication Sig     albuterol (PROAIR HFA;PROVENTIL HFA;VENTOLIN HFA) 90 mcg/actuation inhaler Inhale 1-2 puffs every 4 (four) hours as needed for wheezing.     aspirin 325 MG EC tablet Take 325 mg by mouth daily.     atorvastatin (LIPITOR) 40 MG tablet Take 40 mg by mouth at bedtime.     clopidogrel (PLAVIX) 75 mg tablet Take 75 mg by mouth daily.     fluticasone-salmeterol (ADVAIR) 250-50 mcg/dose DISKUS Inhale 1 puff 2 (two) times a day.     glipiZIDE (GLUCOTROL XL) 10 MG 24 hr tablet Take 10 mg by mouth every evening. And 5 mg at noon     lisinopril (PRINIVIL,ZESTRIL) 20 MG tablet Take 20 mg by mouth daily.     senna-docusate (SENNOSIDES-DOCUSATE SODIUM) 8.6-50 mg tablet Take 2 tablets by mouth 2 (two) times a day.     sitaGLIPtin (JANUVIA) 100 MG tablet Take 100 mg by mouth daily.       No Known Allergies      Review of Systems   No fevers or chills. No headache, lightheadedness or dizziness. Continues  SOB, wet non productive cough. No chest pains or palpitations. Appetite is good.Denies any  dysphagia.  No nausea, vomiting, constipation or diarrhea.  Bleeding yesterday evening from his penis.  No abdominal discomfort.  Passing urine without difficulty.  No further bleeding in his urine or from his urinary meatus.    Physical Exam   PHYSICAL EXAMINATION:  Vital signs:  /63, heart rate 76, respirations 20, temperature 98.6, 98% on room air.  Current weight 154 pounds.  General: Awake, Alert, oriented x3, appropriately, follows simple commands, conversant  HEENT:PERRLA, Pink conjunctiva, anicteric sclerae, moist oral mucosa.  Slight left-sided facial droop.  Tongue is midline.    NECK: Supple, with  No lymphadenopathy  CVS:  S1  S2, without murmur or gallop.  Regular rate and rhythm on exam.  LUNG: Wet non productive cough. Coughing spells at times with difficulty clearing phelgm. Lungs CTA. O2 on @ 2L NC.   BACK: No kyphosis of the thoracic spine  ABDOMEN: Soft, nontender to palpation, with positive bowel sounds  : No irritation at penial tip. No blood noted in pad.   EXTREMITIES:   No LE edema.  He is able to move his left lower extremity kicked his foot out.  Move up and down.  Increased movement in the left hand and fingers.  Grasp present.  Some difficulty with overhead raise.   SKIN: Warm and dry.  Scabbed areas to right lower extremity.  Some bruising surrounding.  NEUROLOGIC: Intact, pulses palpable  PSYCHIATRIC: Cognition intact.         Assessment/Plan:  1. CVA (cerebral vascular accident)     2. Left hemiparesis     3. COPD (chronic obstructive pulmonary disease)     4. Cough     5. Dysphagia     6. Hypertension     7. Coronary artery disease     8. Bleeding of penis       S/P CVA with left-sided hemiparesis.  Increasing movement of the left extremities.  COPD.  Chronic cough.  I did resume low-dose steroids.  Continues on O2 at 2 L nasal cannula.  He is having difficulty with coughing spells and trouble clearing phlegm.  Will start Mucinex 4 times daily.  He does continue on inhalers 4  times daily as well.  Hypertension suspect controlled.  Coronary artery disease. He had some bleeding from his urinary meatus last evening.  This is subsided.  No hematuria.  No burning or pain with urination.  UA UC obtained which was negative.  Denies any abdominal discomfort.  Afebrile.  No nausea.  We will continue to monitor.  If continues would consider pelvic and renal ultrasound.  He is on chronic anticoagulation with Plavix and aspirin.  Hemoglobin stable 11.2.    Electronically signed by: Sarah Smith CNP

## 2021-06-17 NOTE — PROGRESS NOTES
Code Status:  POLST AVAILABLE  Visit Type: Problem Visit     Facility:  [facility] [[id number]]         Facility Type: SNF (Skilled Nursing Facility, TCU)    History of Present Illness: Tariq Pinzon is a 68 y.o. male whom I am seeing today for follow-up on the TCU.  Patient recently hospitalized on 2/15/2018.  He presented to the hospital with left-sided weakness and CT scan showed a subacute/chronic cortical infarct in the right coronary radiata.  CT angiogram was done and showed intracranial atherosclerotic sclerosis and narrowing but no large vessel occlusion.  An MRI showed right pontine infarct as well as small left temporal infarct and right coronary radiata coronary infarct.  He has a history of cerebrovascular disease and had not been taking his medications at home.  He has underlying diabetes.  Poor management.  Underlying COPD hypertension and dyslipidemia.  He is oxygen dependent at home however  he tells me he only wears this at night.  He has left dense hemiparesis.  Some dysphagia as well as aphasia.  He continues in therapy including speech therapy.  No movement on the left.  History of alcohol abuse as well as adjustment disorder with anxiety.     Today patient sitting up in wheelchair. He was seen by Dr. Murphy yesterday for increased SOB and cough. His chest xray showed chronic lung changes consistent with COPD. He ordered Prednisone 20mg daily. He continues with rhonchi throughout. He has very wet coarse non productive cough. He continues on oxgyen at 2L NC. Nursing staff report bleeding from his penis this am. He is a-febrile. He does complain of some burning. UA/UC was obtained.     Active Ambulatory Problems     Diagnosis Date Noted     CVA (cerebral vascular accident) 03/19/2018     Left hemiparesis 03/19/2018     Hypertension 03/19/2018     Dysarthria 03/19/2018     Dysphagia 03/19/2018     Diabetes mellitus 03/22/2018     Resolved Ambulatory Problems     Diagnosis  Date Noted     No Resolved Ambulatory Problems     Past Medical History:   Diagnosis Date     Abnormality of gait      Acute bronchospasm      Adjustment disorder with anxiety      Alcohol use disorder, mild, abuse      CAD (coronary artery disease)      COPD (chronic obstructive pulmonary disease)      CVA (cerebral vascular accident)      DM2 (diabetes mellitus, type 2)      Dysarthria      Dysphagia      HLD (hyperlipidemia)      HTN (hypertension)      Hyperglycemia      Left hemiparesis        Current Outpatient Prescriptions   Medication Sig     albuterol (PROAIR HFA;PROVENTIL HFA;VENTOLIN HFA) 90 mcg/actuation inhaler Inhale 1-2 puffs every 4 (four) hours as needed for wheezing.     aspirin 325 MG EC tablet Take 325 mg by mouth daily.     atorvastatin (LIPITOR) 40 MG tablet Take 40 mg by mouth at bedtime.     clopidogrel (PLAVIX) 75 mg tablet Take 75 mg by mouth daily.     fluticasone-salmeterol (ADVAIR) 250-50 mcg/dose DISKUS Inhale 1 puff 2 (two) times a day.     glipiZIDE (GLUCOTROL XL) 10 MG 24 hr tablet Take 10 mg by mouth every evening. And 5 mg at noon     lisinopril (PRINIVIL,ZESTRIL) 20 MG tablet Take 20 mg by mouth daily.     senna-docusate (SENNOSIDES-DOCUSATE SODIUM) 8.6-50 mg tablet Take 2 tablets by mouth 2 (two) times a day.     sitaGLIPtin (JANUVIA) 100 MG tablet Take 100 mg by mouth daily.       No Known Allergies      Review of Systems   No fevers or chills. No headache, lightheadedness or dizziness. Continues  SOB, wet non productive cough. No chest pains or palpitations. Appetite is good.Denies any dysphagia.  No nausea, vomiting, constipation or diarrhea. Blood from his penies this am. Some burning with urination.         Physical Exam   PHYSICAL EXAMINATION:  Vital signs:   Vitals:    04/10/18 1256   BP: 133/64   Pulse: 62   Resp: 16   Temp: 97.4  F (36.3  C)   SpO2: 98%     General: Awake, Alert, oriented x3, appropriately, follows simple commands, conversant  HEENT:PERRLA, Pink  conjunctiva, anicteric sclerae, moist oral mucosa.  Slight left-sided facial droop.  Tongue is midline.    NECK: Supple, with  No lymphadenopathy  CVS:  S1  S2, without murmur or gallop.  Regular rate and rhythm on exam.  LUNG: Wet non productive cough. Rhonchi throughout with expiratory wheezing.   BACK: No kyphosis of the thoracic spine  ABDOMEN: Soft, nontender to palpation, with positive bowel sounds  : No irritation at penial tip. No blood noted in pad.   EXTREMITIES:   No LE edema. SKIN: Warm and dry, no rashes or erythema noted  NEUROLOGIC: Intact, pulses palpable.  Left-sided hemiparesis. He does have some movement in his left thumb. He is able to move his LLE.   PSYCHIATRIC: Cognition intact.         Assessment/Plan:  1. Shortness of breath     2. Productive cough     3. COPD (chronic obstructive pulmonary disease)     4. CVA (cerebral vascular accident)     5. Left hemiparesis     6. 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 GERD (gastroesophageal reflux disease)     8. Hypertension       S/P CVA with left-sided hemiparesis. He is gaining some strength in his LLE. Wet non productive cough with increased SOB. CXR showed COPD. He was started on Prednisone 20mg. I will taper this. He will need low dose maintenance dose indefinitely. He continues on oxygen 2L NC. I will increase his nebs to TID. He denies any dysphagia. He continues in speech therapy. Blood from his penis today. Will obtain UA/UC. Blood pressure satisfactory controlled. He continues on Omeprazole for GERDs. No further nausea and vomiting.     Electronically signed by: Sarah Smith, JORGE LUIS

## 2021-06-17 NOTE — PROGRESS NOTES
[facility]    Facility:   [facility] [[id number]]   Code Status: UNKNOWN      CHIEF COMPLAINT/REASON FOR VISIT:  Chief Complaint   Patient presents with     Problem Visit     Shortness of breath with cough       HISTORY:      HPI: Tariq is a 68 y.o. male resides here at the [facility] care undergoing physical and occupational therapy secondary to hospitalization on 2/15/2018 with left-sided weakness and sub-acute chronic cortical infarct of the right hemisphere.  MRI did show right pontine infarct as well as small left temporal infarct.  He does have underlying COPD with hypertension dyslipidemia is oxygen dependent at home.  He says he only wears it at night.  He has been on and off oxygen during the day here.    I was asked to see patient today secondary to he did not do well in therapy.  He did have a cough and was short of breath and he thought he was somewhat diaphoretic.  No chest pain or tightness.  His heart rate and regular pulses been okay but his blood pressures been stable.  He has had increased need for oxygen over the weekend and now is on 2 L during the day.  Respiration rate is 24 and O2 sats are 97% on 2 L.  Patient claims to me that on Friday he started to get sick he did walk 45 feet without any problem and seemed to be doing well.  Over the weekend he progressively got worse with a cough and shortness of breath which is productive with colored brown and green sputum.  He denies any other issues at this time.    Past Medical History:   Diagnosis Date     Abnormality of gait      Acute bronchospasm      Adjustment disorder with anxiety      Alcohol use disorder, mild, abuse      CAD (coronary artery disease)      COPD (chronic obstructive pulmonary disease)      CVA (cerebral vascular accident)      DM2 (diabetes mellitus, type 2)      Dysarthria      Dysphagia      HLD (hyperlipidemia)      HTN (hypertension)      Hyperglycemia      Left  hemiparesis              History reviewed. No pertinent family history.  Social History     Social History     Marital status:      Spouse name: N/A     Number of children: N/A     Years of education: N/A     Social History Main Topics     Smoking status: Unknown If Ever Smoked     Smokeless tobacco: None     Alcohol use Yes     Drug use: None     Sexual activity: Not Asked     Other Topics Concern     None     Social History Narrative         Review of Systems   Constitutional:        Up in chair sitting oriented to person place and time.  His review of systems is positive for shortness of breath with cough.  This is get progressively worse over the weekend the cough is productive.  He has no fevers chills nausea vomiting diarrhea change in vision hearing taste or smell chest pain or tightness.  The remainder the review of systems negative.       .  Vitals:    04/09/18 1304   BP: 145/80   Pulse: 85   Resp: 24   Temp: 97.1  F (36.2  C)   SpO2: 97%       Physical Exam   Constitutional: No distress.   HENT:   Head: Normocephalic and atraumatic.   Nose: Nose normal.   Mouth/Throat: No oropharyngeal exudate.   Eyes: Right eye exhibits no discharge. Left eye exhibits no discharge. No scleral icterus.   Neck: Neck supple. No thyromegaly present.   Cardiovascular: Normal rate and regular rhythm.    Pulmonary/Chest:   Crackles at the bases bilateral.   Abdominal: Soft. Bowel sounds are normal. He exhibits no distension. There is no tenderness. There is no rebound and no guarding.   Lymphadenopathy:     He has no cervical adenopathy.   Skin: He is not diaphoretic.   Psychiatric: He has a normal mood and affect.         LABS: Diagnostics pending at this time.      ASSESSMENT:      ICD-10-CM    1. Shortness of breath R06.02    2. Productive cough R05    3. 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 Left hemiparesis G81.94        PLAN: Plan at this time will get a white count chest x-ray stat at this time  and also basic metabolic profile assess his fluid status.  Also get a brain atretic peptide at this time and continue to monitor above medical problems.  We will continue to monitor him closely today with frequent vitals and frequent nursing checks and we will continue to monitor above medical problems.  Greater than 35 minutes was spent on this acute care visit with greater than 50% of the time dedicated to coordination of care.      Total  minutes of which % was spent counseling and coordination of care of the above plan.    Electronically signed by: Elver Murphy DO

## 2021-06-17 NOTE — PROGRESS NOTES
Code Status:  POLST AVAILABLE  Visit Type: Problem Visit     Facility:  [facility] [[id number]]         Facility Type: SNF (Skilled Nursing Facility, TCU)    History of Present Illness: Tariq Pinzno is a 68 y.o. male whom I am seeing today for follow-up on the TCU.  Patient recently hospitalized on 2/15/2018.  He presented to the hospital with left-sided weakness and CT scan showed a subacute/chronic cortical infarct in the right coronary radiata.  CT angiogram was done and showed intracranial atherosclerotic sclerosis and narrowing but no large vessel occlusion.  An MRI showed right pontine infarct as well as small left temporal infarct and right coronary radiata coronary infarct.  He has a history of cerebrovascular disease and had not been taking his medications at home.  He has underlying diabetes.  Poor management.  Underlying COPD hypertension and dyslipidemia.  He is oxygen dependent at home however  he tells me he only wears this at night.  He has left dense hemiparesis.  Some dysphagia as well as aphasia.  He continues in therapy including speech therapy.  No movement on the left.  History of alcohol abuse as well as adjustment disorder with anxiety.     Today patient sitting up in bed. He occasionally uses Oxygen. He denies any SOB. Nursing staff report he gets anxious. He continues wet wet coarse cough at times. He is a-febrile. His lungs are CTA. He has completed his antibiotics. He had another episode of nausea and vomiting over the weekend. He reports it was mostly bile. He denies any abdominal pain. He denies any bloating. He reports having regular BMs. Nursing staff report his last BM was 2 days prior. He told nursing staff that he had heart burn.      Active Ambulatory Problems     Diagnosis Date Noted     CVA (cerebral vascular accident) 03/19/2018     Left hemiparesis 03/19/2018     Hypertension 03/19/2018     Dysarthria 03/19/2018     Dysphagia 03/19/2018     Diabetes  mellitus 03/22/2018     Resolved Ambulatory Problems     Diagnosis Date Noted     No Resolved Ambulatory Problems     Past Medical History:   Diagnosis Date     Abnormality of gait      Acute bronchospasm      Adjustment disorder with anxiety      Alcohol use disorder, mild, abuse      CAD (coronary artery disease)      COPD (chronic obstructive pulmonary disease)      CVA (cerebral vascular accident)      DM2 (diabetes mellitus, type 2)      Dysarthria      Dysphagia      HLD (hyperlipidemia)      HTN (hypertension)      Hyperglycemia      Left hemiparesis        Current Outpatient Prescriptions   Medication Sig     albuterol (PROAIR HFA;PROVENTIL HFA;VENTOLIN HFA) 90 mcg/actuation inhaler Inhale 1-2 puffs every 4 (four) hours as needed for wheezing.     aspirin 325 MG EC tablet Take 325 mg by mouth daily.     atorvastatin (LIPITOR) 40 MG tablet Take 40 mg by mouth at bedtime.     clopidogrel (PLAVIX) 75 mg tablet Take 75 mg by mouth daily.     fluticasone-salmeterol (ADVAIR) 250-50 mcg/dose DISKUS Inhale 1 puff 2 (two) times a day.     glipiZIDE (GLUCOTROL XL) 10 MG 24 hr tablet Take 10 mg by mouth every evening. And 5 mg at noon     lisinopril (PRINIVIL,ZESTRIL) 20 MG tablet Take 20 mg by mouth daily.     senna-docusate (SENNOSIDES-DOCUSATE SODIUM) 8.6-50 mg tablet Take 2 tablets by mouth 2 (two) times a day.     sitaGLIPtin (JANUVIA) 100 MG tablet Take 100 mg by mouth daily.       No Known Allergies      Review of Systems   Refer to HPI otherwise negative.          Physical Exam   PHYSICAL EXAMINATION:  Vital signs:   Vitals:    04/03/18 1856   BP: 112/71   Pulse: 80   Resp: 16   Temp: 97.9  F (36.6  C)   SpO2: 97%     General: Awake, Alert, oriented x3, appropriately, follows simple commands, conversant  HEENT:PERRLA, Pink conjunctiva, anicteric sclerae, moist oral mucosa.  Slight left-sided facial droop.  Tongue is midline.  Mouth ulcers improved. Nasal congestion greatly improved.   NECK: Supple, with mild   lymphadenopathy  CVS:  S1  S2, without murmur or gallop.  Regular rate and rhythm on exam.  LUNG: Clear to auscultation, No wheezes, rales or rhonci.  Continues with wet coare cough.   BACK: No kyphosis of the thoracic spine  ABDOMEN: Soft, nontender to palpation, with positive bowel sounds  EXTREMITIES:   No LE edema. SKIN: Warm and dry, no rashes or erythema noted  NEUROLOGIC: Intact, pulses palpable.  Left-sided hemiparesis.  PSYCHIATRIC: Cognition intact.           Labs:    Recent Results (from the past 240 hour(s))   HM2(CBC w/o Differential)   Result Value Ref Range    WBC 10.2 4.0 - 11.0 thou/uL    RBC 3.76 (L) 4.40 - 6.20 mill/uL    Hemoglobin 12.0 (L) 14.0 - 18.0 g/dL    Hematocrit 35.2 (L) 40.0 - 54.0 %    MCV 94 80 - 100 fL    MCH 31.9 27.0 - 34.0 pg    MCHC 34.1 32.0 - 36.0 g/dL    RDW 12.6 11.0 - 14.5 %    Platelets 307 140 - 440 thou/uL    MPV 10.2 8.5 - 12.5 fL   Automated Differential   Result Value Ref Range    Neutrophils % 79 (H) 50 - 70 %    Lymphocytes % 9 (L) 20 - 40 %    Monocytes % 7 2 - 10 %    Eosinophils % 4 0 - 6 %    Basophils % 1 0 - 2 %    Neutrophils Absolute 8.2 (H) 2.0 - 7.7 thou/uL    Lymphocytes Absolute 0.9 0.8 - 4.4 thou/uL    Monocytes Absolute 0.8 0.0 - 0.9 thou/uL    Eosinophils Absolute 0.4 0.0 - 0.4 thou/uL    Basophils Absolute 0.1 0.0 - 0.2 thou/uL   Basic Metabolic Panel   Result Value Ref Range    Sodium 141 136 - 145 mmol/L    Potassium 4.1 3.5 - 5.0 mmol/L    Chloride 101 98 - 107 mmol/L    CO2 24 22 - 31 mmol/L    Anion Gap, Calculation 16 5 - 18 mmol/L    Glucose 67 (L) 70 - 125 mg/dL    Calcium 9.6 8.5 - 10.5 mg/dL    BUN 18 8 - 22 mg/dL    Creatinine 0.79 0.70 - 1.30 mg/dL    GFR MDRD Af Amer >60 >60 mL/min/1.73m2    GFR MDRD Non Af Amer >60 >60 mL/min/1.73m2   BNP(B-type Natriuretic Peptide)   Result Value Ref Range    BNP <10 0 - 65 pg/mL   Urinalysis   Result Value Ref Range    Color, UA Pema (!) Colorless, Yellow, Straw, Light Yellow    Clarity, UA Cloudy (!)  Clear    Glucose, UA Negative Negative    Bilirubin, UA Negative Negative    Ketones, UA 25 mg/dL (!) Negative    Specific Gravity, UA 1.030 1.001 - 1.030    Blood, UA Negative Negative    pH, UA 6.0 4.5 - 8.0    Protein, UA >=500 mg/dL (!) Negative mg/dL    Urobilinogen, UA <2.0 E.U./dL <2.0 E.U./dL, 2.0 E.U./dL    Nitrite, UA Negative Negative    Leukocytes, UA Negative Negative    Bacteria, UA None Seen None Seen hpf    RBC, UA 0-2 None Seen, 0-2 hpf    WBC, UA 0-5 None Seen, 0-5 hpf    Squam Epithel, UA 0-5 None Seen, 0-5 lpf    WBC Clumps None Seen None Seen    Amorphous, UA Many (!) None Seen    Mucus, UA Many (!) None Seen lpf    Ca Oxalate Jackeline, UA Present (!) None Seen    Hyaline Casts, UA 0-5 0-5, None Seen lpf   Culture, Urine   Result Value Ref Range    Culture No Growth          Assessment/Plan:  1. CVA (cerebral vascular accident)     2. 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 Cough     4. Diabetes mellitus     5. Left hemiparesis     6. Nausea and vomiting     7. Tachycardia     8. GERD (gastroesophageal reflux disease)         Patient with recent CVA with left-sided hemiparesis.  Recent nausea and vomiting episode over weekend. He reports it was brown bile. He denies any pain or bloating of the abdomen. He reports regular BMs. Nursing staff report GERDS. I treat with Omeprazole 20mg EVERY DAY. Guaiac his emesis if he has further emesis. This is the second episodes. Will obtain abdominal xray. Dysphagia. He continues with wet coarse cough. His nasal congestion has improved. He has completed his antibiotics. He continues on nebs. No SOB. A-febrile. Lungs CTA. Will add Mucin ex to help thin his secretions. Tachycardia improved. Regular rate and rhythm on exam.       Electronically signed by: Sarah Smith, CNP

## 2021-06-17 NOTE — PROGRESS NOTES
Code Status:  POLST AVAILABLE  Visit Type: Problem Visit     Facility:  [facility] [[id number]]         Facility Type: SNF (Skilled Nursing Facility, TCU)    History of Present Illness: Tariq Pinzon is a 68 y.o. male whom I am seeing today for follow-up on the TCU.  Patient recently hospitalized on 2/15/2018.  He presented to the hospital with left-sided weakness and CT scan showed a subacute/chronic cortical infarct in the right coronary radiata.  CT angiogram was done and showed intracranial atherosclerotic sclerosis and narrowing but no large vessel occlusion.  An MRI showed right pontine infarct as well as small left temporal infarct and right coronary radiata coronary infarct.  He has a history of cerebrovascular disease and had not been taking his medications at home.  He has underlying diabetes.  Poor management.  Underlying COPD hypertension and dyslipidemia.  He is oxygen dependent at home however  he tells me he only wears this at night.  He has left dense hemiparesis.  Some dysphagia as well as aphasia.  He continues in therapy including speech therapy.  No movement on the left.  History of alcohol abuse as well as adjustment disorder with anxiety.     I was phoned last evening regarding elevated heart rates.  HR Was up to 140. Pt denied any CP. He has chronic SOB. Today he denies any CP or palpitations. He is chronically on oxygen at 2L NC.  Today heart rate is in the 90s-100.  I did have them give an extra dose of metoprolol last evening.  Heart rate is regular rate and rhythm on exam today.  Patient  sounds nasally congested today.  He does report sore throat with increased cough over the weekend.  Denies any fever.  However would look back he did have low-grade temp.  I did have nursing staff obtain a UA UC and laboratory is pending.  Patient with recent nosebleed 1 day ago.  No further bleeding.  Patient with nausea and vomiting over the weekend.  Tells me he was vomiting up  yellow  bile.  He denies any abdominal discomfort.  Tells me he is having regular bowel movements.  No further emesis.  No nausea.  Appetite is per normal.      Active Ambulatory Problems     Diagnosis Date Noted     CVA (cerebral vascular accident) 03/19/2018     Left hemiparesis 03/19/2018     Hypertension 03/19/2018     Dysarthria 03/19/2018     Dysphagia 03/19/2018     Diabetes mellitus 03/22/2018     Resolved Ambulatory Problems     Diagnosis Date Noted     No Resolved Ambulatory Problems     Past Medical History:   Diagnosis Date     Abnormality of gait      Acute bronchospasm      Adjustment disorder with anxiety      Alcohol use disorder, mild, abuse      CAD (coronary artery disease)      COPD (chronic obstructive pulmonary disease)      CVA (cerebral vascular accident)      DM2 (diabetes mellitus, type 2)      Dysarthria      Dysphagia      HLD (hyperlipidemia)      HTN (hypertension)      Hyperglycemia      Left hemiparesis        Current Outpatient Prescriptions   Medication Sig     albuterol (PROAIR HFA;PROVENTIL HFA;VENTOLIN HFA) 90 mcg/actuation inhaler Inhale 1-2 puffs every 4 (four) hours as needed for wheezing.     aspirin 325 MG EC tablet Take 325 mg by mouth daily.     atorvastatin (LIPITOR) 40 MG tablet Take 40 mg by mouth at bedtime.     clopidogrel (PLAVIX) 75 mg tablet Take 75 mg by mouth daily.     fluticasone-salmeterol (ADVAIR) 250-50 mcg/dose DISKUS Inhale 1 puff 2 (two) times a day.     glipiZIDE (GLUCOTROL XL) 10 MG 24 hr tablet Take 10 mg by mouth every evening. And 5 mg at noon     lisinopril (PRINIVIL,ZESTRIL) 20 MG tablet Take 20 mg by mouth daily.     senna-docusate (SENNOSIDES-DOCUSATE SODIUM) 8.6-50 mg tablet Take 2 tablets by mouth 2 (two) times a day.     sitaGLIPtin (JANUVIA) 100 MG tablet Take 100 mg by mouth daily.       No Known Allergies      Review of Systems   Refer to HPI otherwise negative.          Physical Exam   PHYSICAL EXAMINATION:  Vital signs:   Vitals:     03/27/18 2054   BP: 119/66   Pulse: (!) 119   Resp: 24   Temp: 99.4  F (37.4  C)   SpO2: 98%     General: Awake, Alert, oriented x3, appropriately, follows simple commands, conversant  HEENT:PERRLA, Pink conjunctiva, anicteric sclerae, moist oral mucosa.  Slight left-sided facial droop.  Tongue is midline.  He does have lesions to the tongue which are improving.  Back of throat slightly pink.  Nasal congestion heard.  Denies pain over the maxillary and frontal sinuses.  NECK: Supple, with mild  lymphadenopathy  CVS:  S1  S2, without murmur or gallop.  Regular rate and rhythm on exam.  LUNG: Clear to auscultation, No wheezes, rales or rhonci.  Somewhat shallow respiratory effort.  Continues on O2 at 2 L nasal cannula.  Occasional wet nonproductive cough.  BACK: No kyphosis of the thoracic spine  ABDOMEN: Soft, nontender to palpation, with positive bowel sounds  EXTREMITIES: Moves all extremities with generalized weakness.  Trace lower extremity edema.  SKIN: Warm and dry, no rashes or erythema noted  NEUROLOGIC: Intact, pulses palpable.  Left-sided hemiparesis.  PSYCHIATRIC: Cognition intact.  Attempts to make jokes.          Labs:    Recent Results (from the past 240 hour(s))   C. Diff Toxin By PCR   Result Value Ref Range    C.Difficile Toxigenic by PCR Negative Negative    Ribotype 027/NAP1/B1 Presumptive Negative Presumptive Negative   Hemoglobin   Result Value Ref Range    Hemoglobin 11.3 (L) 14.0 - 18.0 g/dL   HM2(CBC w/o Differential)   Result Value Ref Range    WBC 10.2 4.0 - 11.0 thou/uL    RBC 3.76 (L) 4.40 - 6.20 mill/uL    Hemoglobin 12.0 (L) 14.0 - 18.0 g/dL    Hematocrit 35.2 (L) 40.0 - 54.0 %    MCV 94 80 - 100 fL    MCH 31.9 27.0 - 34.0 pg    MCHC 34.1 32.0 - 36.0 g/dL    RDW 12.6 11.0 - 14.5 %    Platelets 307 140 - 440 thou/uL    MPV 10.2 8.5 - 12.5 fL   Automated Differential   Result Value Ref Range    Neutrophils % 79 (H) 50 - 70 %    Lymphocytes % 9 (L) 20 - 40 %    Monocytes % 7 2 - 10 %     Eosinophils % 4 0 - 6 %    Basophils % 1 0 - 2 %    Neutrophils Absolute 8.2 (H) 2.0 - 7.7 thou/uL    Lymphocytes Absolute 0.9 0.8 - 4.4 thou/uL    Monocytes Absolute 0.8 0.0 - 0.9 thou/uL    Eosinophils Absolute 0.4 0.0 - 0.4 thou/uL    Basophils Absolute 0.1 0.0 - 0.2 thou/uL   Basic Metabolic Panel   Result Value Ref Range    Sodium 141 136 - 145 mmol/L    Potassium 4.1 3.5 - 5.0 mmol/L    Chloride 101 98 - 107 mmol/L    CO2 24 22 - 31 mmol/L    Anion Gap, Calculation 16 5 - 18 mmol/L    Glucose 67 (L) 70 - 125 mg/dL    Calcium 9.6 8.5 - 10.5 mg/dL    BUN 18 8 - 22 mg/dL    Creatinine 0.79 0.70 - 1.30 mg/dL    GFR MDRD Af Amer >60 >60 mL/min/1.73m2    GFR MDRD Non Af Amer >60 >60 mL/min/1.73m2   BNP(B-type Natriuretic Peptide)   Result Value Ref Range    BNP <10 0 - 65 pg/mL   Urinalysis   Result Value Ref Range    Color, UA Pema (!) Colorless, Yellow, Straw, Light Yellow    Clarity, UA Cloudy (!) Clear    Glucose, UA Negative Negative    Bilirubin, UA Negative Negative    Ketones, UA 25 mg/dL (!) Negative    Specific Gravity, UA 1.030 1.001 - 1.030    Blood, UA Negative Negative    pH, UA 6.0 4.5 - 8.0    Protein, UA >=500 mg/dL (!) Negative mg/dL    Urobilinogen, UA <2.0 E.U./dL <2.0 E.U./dL, 2.0 E.U./dL    Nitrite, UA Negative Negative    Leukocytes, UA Negative Negative    Bacteria, UA None Seen None Seen hpf    RBC, UA 0-2 None Seen, 0-2 hpf    WBC, UA 0-5 None Seen, 0-5 hpf    Squam Epithel, UA 0-5 None Seen, 0-5 lpf    WBC Clumps None Seen None Seen    Amorphous, UA Many (!) None Seen    Mucus, UA Many (!) None Seen lpf    Ca Oxalate Jackeline, UA Present (!) None Seen    Hyaline Casts, UA 0-5 0-5, None Seen lpf   Culture, Urine   Result Value Ref Range    Culture No Growth          Assessment/Plan:  1. CVA (cerebral vascular accident)     2. 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 Left hemiparesis     4. Diabetes mellitus     5. Tachycardia     6. Nasal congestion     7. Coronary artery disease     8.  Hypertension     9. Epistaxis       Patient with recent CVA with left-sided hemiparesis.  Dysphagia.  Continues with speech therapy.  He had nausea and vomiting over the weekend.  Tells me he was vomiting yellow bile.  No abdominal discomfort.  Low-grade temp.  No abdominal tenderness on exam.  He is eating well.  Having regular bowel movements.  Recent nosebleed.  Pressure and packing were applied and this resolved.  He is on aspirin and Plavix.  Pro time within normal limits.  Hemoglobin stable.  He has some nasal congestion today with sore throat over the weekend.  Throat slightly red.  He does have a wet nonproductive cough occasionally.  Chronic O2 use.  Will need to monitor for signs and symptoms of aspiration pneumonia secondary to recent emesis as well as nosebleed.  Hypertension satisfactory controlled.  I did have them obtain UA UC secondary to elevated heart rate.  No history of A. fib.  She does continue on metoprolol.  Did have them administer an additional dose of metoprolol.  No tachycardia noted today.  Heart rate of regular rate and rhythm.  If continues will obtain EKG.      Electronically signed by: Sarah Smith CNP

## 2021-06-17 NOTE — PROGRESS NOTES
[facility]    Facility:   [facility] [[id number]]   Code Status: FULL CODE      CHIEF COMPLAINT/REASON FOR VISIT:  Chief Complaint   Patient presents with     Follow Up     rehab,       HISTORY:      HPI: Tariq is a 68 y.o. male who I had a chance to revisit with secondary to his hospitalization February 15, 2018 secondary left-sided weakness with a CAT scan showing a subacute/chronic lacunar infarct and a right pontine infarct.  He currently is doing good he is in pretty good spirits.  We did have a chance to talk about the recent snowfall and [place] and that opening fishing just a couple weeks away probably will be an opening of fishing.  He does seem to be in pretty good spirits today.  He has been normotensive and afebrile.  Able ambulate about 100 feet.  He is working with speech therapy.  Is getting his nebs.  No pain issues.  No heartburn or reflux.    Past Medical History:   Diagnosis Date     Abnormality of gait      Acute bronchospasm      Adjustment disorder with anxiety      Alcohol use disorder, mild, abuse      CAD (coronary artery disease)      COPD (chronic obstructive pulmonary disease)      CVA (cerebral vascular accident)      DM2 (diabetes mellitus, type 2)      Dysarthria      Dysphagia      HLD (hyperlipidemia)      HTN (hypertension)      Hyperglycemia      Left hemiparesis              No family history on file.  Social History     Social History     Marital status:      Spouse name: N/A     Number of children: N/A     Years of education: N/A     Social History Main Topics     Smoking status: Unknown If Ever Smoked     Smokeless tobacco: Not on file     Alcohol use Yes     Drug use: Not on file     Sexual activity: Not on file     Other Topics Concern     Not on file     Social History Narrative         Review of Systems  Currently denies any chills fever coughing wheezing flulike symptoms nausea vomiting  diarrhea dysuria rashes or sores.  Does have a history of recent right pontine infarct CVD CAD hypertension hyperlipidemia COPD      Current Outpatient Prescriptions:      albuterol (PROAIR HFA;PROVENTIL HFA;VENTOLIN HFA) 90 mcg/actuation inhaler, Inhale 1-2 puffs every 4 (four) hours as needed for wheezing., Disp: , Rfl:      aspirin 325 MG EC tablet, Take 325 mg by mouth daily., Disp: , Rfl:      atorvastatin (LIPITOR) 40 MG tablet, Take 40 mg by mouth at bedtime., Disp: , Rfl:      clopidogrel (PLAVIX) 75 mg tablet, Take 75 mg by mouth daily., Disp: , Rfl:      fluticasone-salmeterol (ADVAIR) 250-50 mcg/dose DISKUS, Inhale 1 puff 2 (two) times a day., Disp: , Rfl:      glipiZIDE (GLUCOTROL XL) 10 MG 24 hr tablet, Take 10 mg by mouth every evening. And 5 mg at noon, Disp: , Rfl:      lisinopril (PRINIVIL,ZESTRIL) 20 MG tablet, Take 20 mg by mouth daily., Disp: , Rfl:      senna-docusate (SENNOSIDES-DOCUSATE SODIUM) 8.6-50 mg tablet, Take 2 tablets by mouth 2 (two) times a day., Disp: , Rfl:      sitaGLIPtin (JANUVIA) 100 MG tablet, Take 100 mg by mouth daily., Disp: , Rfl:     .There were no vitals filed for this visit.  Blood pressure 137/64 pulse 61 respirations 16 saturation with oxygen 96% temperature 97.5  Physical Exam   Constitutional: He is oriented to person, place, and time. No distress.   HENT:    Neck: Neck supple. No thyromegaly present.   Cardiovascular: Normal rate, regular rhythm and normal heart sounds.    Pulmonary/Chest: Breath sounds normal.   Dysphagia.   Abdominal: Bowel sounds are normal. There is no tenderness. There is no guarding.   Musculoskeletal:   History of right pontine infarct.  Left hemiparesis   Lymphadenopathy:     He has no cervical adenopathy.   Neurological: He is oriented to person, place, and time.   Mild left facial droop   Skin: Skin is warm and dry. No rash noted.   Psychiatric: His behavior is normal.   History of depression and anxiety    LABS:   Lab Results    Component Value Date    WBC 10.3 04/09/2018    HGB 11.5 (L) 04/09/2018    HCT 35.2 (L) 03/26/2018    MCV 94 03/26/2018     03/26/2018     Results for orders placed or performed in visit on 03/26/18   Basic Metabolic Panel   Result Value Ref Range    Sodium 141 136 - 145 mmol/L    Potassium 4.1 3.5 - 5.0 mmol/L    Chloride 101 98 - 107 mmol/L    CO2 24 22 - 31 mmol/L    Anion Gap, Calculation 16 5 - 18 mmol/L    Glucose 67 (L) 70 - 125 mg/dL    Calcium 9.6 8.5 - 10.5 mg/dL    BUN 18 8 - 22 mg/dL    Creatinine 0.79 0.70 - 1.30 mg/dL    GFR MDRD Af Amer >60 >60 mL/min/1.73m2    GFR MDRD Non Af Amer >60 >60 mL/min/1.73m2           ASSESSMENT:      ICD-10-CM    1. COPD (chronic obstructive pulmonary disease) J44.9    2. Dysphagia R13.10    3. Hypertension I10    4. CVA (cerebral vascular accident) I63.9        PLAN:    No further changes at this time.  Is in pretty good spirits.  Making pretty good progress.  Enjoyed visiting with him again and will continue to manage and follow.      Electronically signed by: Michael Duane Johnson, CNP

## 2021-06-17 NOTE — PROGRESS NOTES
Code Status:  POLST AVAILABLE  Visit Type: Problem Visit     Facility:  [facility] [[id number]]         Facility Type: SNF (Skilled Nursing Facility, TCU)    History of Present Illness: Tariq Pinzon is a 68 y.o. male whom I am seeing today for follow-up on the TCU.  Patient recently hospitalized on 2/15/2018.  He presented to the hospital with left-sided weakness and CT scan showed a subacute/chronic cortical infarct in the right coronary radiata.  CT angiogram was done and showed intracranial atherosclerotic sclerosis and narrowing but no large vessel occlusion.  An MRI showed right pontine infarct as well as small left temporal infarct and right coronary radiata coronary infarct.  He has a history of cerebrovascular disease and had not been taking his medications at home.  He has underlying diabetes.  Poor management.  Underlying COPD hypertension and dyslipidemia.  He is oxygen dependent at home however  he tells me he only wears this at night.  He has left dense hemiparesis.  Some dysphagia as well as aphasia.  He continues in therapy including speech therapy.  No movement on the left.  History of alcohol abuse as well as adjustment disorder with anxiety.     Today patient sitting up in wheelchair.  Patient with underlying COPD.  He is continued with a very wet nonproductive cough.  Usually he denies increased shortness of breath however today reports difficulty breathing and increased difficulty while lying in bed at night.  He continues on O2 at 2-3 L.  He has been refusing his nebulizers.  He is on 2 inhalers.  Chest x-ray obtained.  The comparison of previous checks x-ray.  No acute findings.  COPD as compared to previous x-ray noted.  He is continued on low-dose prednisone 10 mg daily.  Also Mucinex twice daily.      Active Ambulatory Problems     Diagnosis Date Noted     CVA (cerebral vascular accident) 03/19/2018     Left hemiparesis 03/19/2018     Hypertension 03/19/2018      Dysarthria 03/19/2018     Dysphagia 03/19/2018     Diabetes mellitus 03/22/2018     COPD (chronic obstructive pulmonary disease) 04/16/2018     Resolved Ambulatory Problems     Diagnosis Date Noted     No Resolved Ambulatory Problems     Past Medical History:   Diagnosis Date     Abnormality of gait      Acute bronchospasm      Adjustment disorder with anxiety      Alcohol use disorder, mild, abuse      CAD (coronary artery disease)      COPD (chronic obstructive pulmonary disease)      CVA (cerebral vascular accident)      DM2 (diabetes mellitus, type 2)      Dysarthria      Dysphagia      HLD (hyperlipidemia)      HTN (hypertension)      Hyperglycemia      Left hemiparesis        Current Outpatient Prescriptions   Medication Sig     albuterol (PROAIR HFA;PROVENTIL HFA;VENTOLIN HFA) 90 mcg/actuation inhaler Inhale 1-2 puffs every 4 (four) hours as needed for wheezing.     aspirin 325 MG EC tablet Take 325 mg by mouth daily.     atorvastatin (LIPITOR) 40 MG tablet Take 40 mg by mouth at bedtime.     clopidogrel (PLAVIX) 75 mg tablet Take 75 mg by mouth daily.     fluticasone-salmeterol (ADVAIR) 250-50 mcg/dose DISKUS Inhale 1 puff 2 (two) times a day.     glipiZIDE (GLUCOTROL XL) 10 MG 24 hr tablet Take 10 mg by mouth every evening. And 5 mg at noon     lisinopril (PRINIVIL,ZESTRIL) 20 MG tablet Take 20 mg by mouth daily.     senna-docusate (SENNOSIDES-DOCUSATE SODIUM) 8.6-50 mg tablet Take 2 tablets by mouth 2 (two) times a day.     sitaGLIPtin (JANUVIA) 100 MG tablet Take 100 mg by mouth daily.       No Known Allergies      Review of Systems   No fevers or chills. No headache, lightheadedness or dizziness. Continues  SOB with increased complaints today including difficulty at night.  Continues with wet non productive cough. No chest pains or palpitations. Appetite is good.Denies any dysphagia.  No nausea, vomiting, constipation or diarrhea.  Loose stools improved with decreasing stool softener.  No  dysuria.    Physical Exam   PHYSICAL EXAMINATION:  Vital signs:  /77, heart rate 58, respirations 20, temperature 97.4, 95% on 2 L current weight is 154 pounds.  General: Awake, Alert, oriented x3, appropriately, follows simple commands, conversant  HEENT:PERRLA, Pink conjunctiva, anicteric sclerae, moist oral mucosa.  Slight left-sided facial droop.  Tongue is midline.    NECK: Supple, with  No lymphadenopathy  CVS:  S1  S2, without murmur or gallop.  Regular rate and rhythm on exam.  LUNG: Wet non productive cough. Lungs CTA. O2 on @ 2L NC.  Increased dyspnea with talking.  BACK: No kyphosis of the thoracic spine  ABDOMEN: Soft, nontender to palpation, with positive bowel sounds  : No irritation at penial tip. No blood noted in pad.   EXTREMITIES:   No LE edema.  He is able to move his left lower extremity kicked his foot out.  Move up and down.  Increased movement in the left hand and fingers.  Grasp present.  Some difficulty with overhead raise.   SKIN: Warm and dry.  Scabbed areas to right lower extremity.  Some bruising surrounding.  NEUROLOGIC: Intact, pulses palpable  PSYCHIATRIC: Cognition intact.         Assessment/Plan:  1. CVA (cerebral vascular accident)     2. Left hemiparesis     3. COPD (chronic obstructive pulmonary disease)     4. Diabetes mellitus       Patient with CVA with left-sided hemiparesis.  He is gaining strength in his left side.  Underlying COPD oxygen dependent.  Patient experiencing increased dyspnea with continued wet nonproductive cough.  He is continued on prednisone 10 mg daily.  He is on multiple inhalers.  Recently started on Mucinex twice daily.  Chest x-ray showed no acute findings other than COPD as previous.  We will treat him with a burst of prednisone starting at 30  With a wean down.  Diabetes.  Currently only on oral anti-hyperglycemics.  Will need to monitor blood sugars with increase in steroids.      Electronically signed by: Sarah Smith, JORGE LUIS

## 2021-06-17 NOTE — PROGRESS NOTES
Code Status:  POLST AVAILABLE  Visit Type: Problem Visit     Facility:  [facility] [[id number]]         Facility Type: SNF (Skilled Nursing Facility, TCU)    History of Present Illness: Tariq Pinzon is a 68 y.o. male whom I am seeing today for follow-up on the TCU.  Patient recently hospitalized on 2/15/2018.  He presented to the hospital with left-sided weakness and CT scan showed a subacute/chronic cortical infarct in the right coronary radiata.  CT angiogram was done and showed intracranial atherosclerotic sclerosis and narrowing but no large vessel occlusion.  An MRI showed right pontine infarct as well as small left temporal infarct and right coronary radiata coronary infarct.  He has a history of cerebrovascular disease and had not been taking his medications at home.  He has underlying diabetes.  Poor management.  Underlying COPD hypertension and dyslipidemia.  He is oxygen dependent at home however  he tells me he only wears this at night.  He has left dense hemiparesis.  Some dysphagia as well as aphasia.  He continues in therapy including speech therapy.  No movement on the left.  History of alcohol abuse as well as adjustment disorder with anxiety.     Today patient sitting up in wheelchair. He continues on Oxygen @ 2L. He has wet non productive cough. He continues on burst of prednisone and nebs. He is unable to expel sputum. I will give him mucinex. He was transferring with nursing student this am and fell on the floor. He has some mild bruising to left hip. This is his stroke affected side. He denies any pain. No increased pain with PROM. UA/UC showed no growth.     Active Ambulatory Problems     Diagnosis Date Noted     CVA (cerebral vascular accident) 03/19/2018     Left hemiparesis 03/19/2018     Hypertension 03/19/2018     Dysarthria 03/19/2018     Dysphagia 03/19/2018     Diabetes mellitus 03/22/2018     Resolved Ambulatory Problems     Diagnosis Date Noted     No Resolved  Ambulatory Problems     Past Medical History:   Diagnosis Date     Abnormality of gait      Acute bronchospasm      Adjustment disorder with anxiety      Alcohol use disorder, mild, abuse      CAD (coronary artery disease)      COPD (chronic obstructive pulmonary disease)      CVA (cerebral vascular accident)      DM2 (diabetes mellitus, type 2)      Dysarthria      Dysphagia      HLD (hyperlipidemia)      HTN (hypertension)      Hyperglycemia      Left hemiparesis        Current Outpatient Prescriptions   Medication Sig     albuterol (PROAIR HFA;PROVENTIL HFA;VENTOLIN HFA) 90 mcg/actuation inhaler Inhale 1-2 puffs every 4 (four) hours as needed for wheezing.     aspirin 325 MG EC tablet Take 325 mg by mouth daily.     atorvastatin (LIPITOR) 40 MG tablet Take 40 mg by mouth at bedtime.     clopidogrel (PLAVIX) 75 mg tablet Take 75 mg by mouth daily.     fluticasone-salmeterol (ADVAIR) 250-50 mcg/dose DISKUS Inhale 1 puff 2 (two) times a day.     glipiZIDE (GLUCOTROL XL) 10 MG 24 hr tablet Take 10 mg by mouth every evening. And 5 mg at noon     lisinopril (PRINIVIL,ZESTRIL) 20 MG tablet Take 20 mg by mouth daily.     senna-docusate (SENNOSIDES-DOCUSATE SODIUM) 8.6-50 mg tablet Take 2 tablets by mouth 2 (two) times a day.     sitaGLIPtin (JANUVIA) 100 MG tablet Take 100 mg by mouth daily.       No Known Allergies      Review of Systems   No fevers or chills. No headache, lightheadedness or dizziness. Continues  SOB, wet non productive cough. No chest pains or palpitations. Appetite is good.Denies any dysphagia.  No nausea, vomiting, constipation or diarrhea.Deneis any pain from his fall.       Physical Exam   PHYSICAL EXAMINATION:  Vital signs:   Vitals:    04/12/18 2214   BP: 129/79   Pulse: 89   Resp: 16   Temp: 97.4  F (36.3  C)   SpO2: 95%     General: Awake, Alert, oriented x3, appropriately, follows simple commands, conversant  HEENT:PERRLA, Pink conjunctiva, anicteric sclerae, moist oral mucosa.  Slight  left-sided facial droop.  Tongue is midline.    NECK: Supple, with  No lymphadenopathy  CVS:  S1  S2, without murmur or gallop.  Regular rate and rhythm on exam.  LUNG: Wet non productive cough. Rhonchi throughout with expiratory wheezing.   BACK: No kyphosis of the thoracic spine  ABDOMEN: Soft, nontender to palpation, with positive bowel sounds  : No irritation at penial tip. No blood noted in pad.   EXTREMITIES:   No LE edema. SKIN: Warm and dry, no rashes or erythema noted  NEUROLOGIC: Intact, pulses palpable.  Left-sided hemiparesis. He does have some movement in his left thumb. He is able to move his LLE. No pain in his left hip with PROM>.   PSYCHIATRIC: Cognition intact.         Assessment/Plan:  1. COPD (chronic obstructive pulmonary disease)     2. CVA (cerebral vascular accident)     3. Left hemiparesis     4. 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 Hypertension     6. Diabetes mellitus     7. Fall         S/P CVA with left-sided hemiparesis. He is gaining some strength in his LLE. Wet non productive cough with increased SOB. CXR showed COPD. He continues on prednisone taper and nebs. Reports not being able to expel sputum. Lungs are CTA. I will order Mucinex. He continues on oxygen @ 2L NC. He continues on ST. HTN blood pressures satisfactory controlled. DM well controlled. Fall this am during transfer. No injury     Electronically signed by: Sarah Smith, JORGE LUIS

## 2021-06-17 NOTE — PROGRESS NOTES
[facility]    Facility:   [facility] [[id number]]   Code Status: FULL CODE      CHIEF COMPLAINT/REASON FOR VISIT:  Chief Complaint   Patient presents with     Follow Up     rehab,       HISTORY:      HPI: Tariq is a 68 y.o. male who I had a chance to revisit with secondary to his hospitalization February 15, 2018 second left-sided weakness with a CAT scan showing a subacute/chronic lacunar infarct and a right pontine infarct.  He does have left-sided weakness/hemiparesis.  He is actually got a little more movement in his left leg and some in his left arm that is able to at least help pick it up and actually there is some movement in his fingers are moving to his pretty pleased with that he is able to ambulate 100 feet.  He has been normotensive and afebrile.  Is currently on prednisone.  Blood sugars in the morning with a past week ranging anywhere from 115-170 in the p.m. has been in the 2-300 range but he also is on some prednisone he is also being treated so we will see how things go anything with the prednisone sure he will pop back down back to normal again otherwise he does not want to be any sliding scale insulin.  He is on a level 3 diet with thin liquids.  He is getting extra house nutrient supplements.  Does have a good sense of humor.  He does have chronic COPD is getting his nebulizations without any exacerbation.  Denies heartburn or reflux.    Past Medical History:   Diagnosis Date     Abnormality of gait      Acute bronchospasm      Adjustment disorder with anxiety      Alcohol use disorder, mild, abuse      CAD (coronary artery disease)      COPD (chronic obstructive pulmonary disease)      CVA (cerebral vascular accident)      DM2 (diabetes mellitus, type 2)      Dysarthria      Dysphagia      HLD (hyperlipidemia)      HTN (hypertension)      Hyperglycemia      Left hemiparesis              No family history on file.  Social History     Social History      Marital status:      Spouse name: N/A     Number of children: N/A     Years of education: N/A     Social History Main Topics     Smoking status: Unknown If Ever Smoked     Smokeless tobacco: Not on file     Alcohol use Yes     Drug use: Not on file     Sexual activity: Not on file     Other Topics Concern     Not on file     Social History Narrative         Review of Systems  Currently denies any chills fever coughing wheezing flulike symptoms nausea vomiting diarrhea dysuria rashes or sores.  Does have a history of recent right pontine infarct CVD CAD hypertension hyperlipidemia COPD        Current Outpatient Prescriptions:      albuterol (PROAIR HFA;PROVENTIL HFA;VENTOLIN HFA) 90 mcg/actuation inhaler, Inhale 1-2 puffs every 4 (four) hours as needed for wheezing., Disp: , Rfl:      aspirin 325 MG EC tablet, Take 325 mg by mouth daily., Disp: , Rfl:      atorvastatin (LIPITOR) 40 MG tablet, Take 40 mg by mouth at bedtime., Disp: , Rfl:      clopidogrel (PLAVIX) 75 mg tablet, Take 75 mg by mouth daily., Disp: , Rfl:      fluticasone-salmeterol (ADVAIR) 250-50 mcg/dose DISKUS, Inhale 1 puff 2 (two) times a day., Disp: , Rfl:      glipiZIDE (GLUCOTROL XL) 10 MG 24 hr tablet, Take 10 mg by mouth every evening. And 5 mg at noon, Disp: , Rfl:      lisinopril (PRINIVIL,ZESTRIL) 20 MG tablet, Take 20 mg by mouth daily., Disp: , Rfl:      senna-docusate (SENNOSIDES-DOCUSATE SODIUM) 8.6-50 mg tablet, Take 2 tablets by mouth 2 (two) times a day., Disp: , Rfl:      sitaGLIPtin (JANUVIA) 100 MG tablet, Take 100 mg by mouth daily., Disp: , Rfl:   .There were no vitals filed for this visit.  Blood pressure 141/77 pulse 90 respirations 20 temperature 97.6  Physical Exam   Constitutional: He is oriented to person, place, and time. No distress.   HENT:     Cardiovascular: Normal rate, regular rhythm and normal heart sounds.    Pulmonary/Chest: Chronic expiratory wheeze.  Dysphagia.   Abdominal: Bowel sounds are normal. There  is no tenderness. There is no guarding.   Musculoskeletal:   History of right pontine infarct.  Left hemiparesis   Lymphadenopathy:     He has no cervical adenopathy.   Neurological: He is oriented to person, place, and time.   Mild left facial droop   Skin: Skin is warm and dry. No rash noted.   Psychiatric: His behavior is normal.   History of depression and anxiety     LABS:   Lab Results   Component Value Date    WBC 10.3 04/09/2018    HGB 11.5 (L) 04/09/2018    HCT 35.2 (L) 03/26/2018    MCV 94 03/26/2018     03/26/2018     No results found for: HGBA1C  Results for orders placed or performed in visit on 03/26/18   Basic Metabolic Panel   Result Value Ref Range    Sodium 141 136 - 145 mmol/L    Potassium 4.1 3.5 - 5.0 mmol/L    Chloride 101 98 - 107 mmol/L    CO2 24 22 - 31 mmol/L    Anion Gap, Calculation 16 5 - 18 mmol/L    Glucose 67 (L) 70 - 125 mg/dL    Calcium 9.6 8.5 - 10.5 mg/dL    BUN 18 8 - 22 mg/dL    Creatinine 0.79 0.70 - 1.30 mg/dL    GFR MDRD Af Amer >60 >60 mL/min/1.73m2    GFR MDRD Non Af Amer >60 >60 mL/min/1.73m2           ASSESSMENT:      ICD-10-CM    1. Left hemiparesis G81.94    2. Dysphagia R13.10    3. COPD (chronic obstructive pulmonary disease) J44.9    4. CVA (cerebral vascular accident) I63.9        PLAN:    Continue to monitor the blood sugars in particular because he is on prednisone and hopefully does come back down to normal.  Perhaps in the future running A1c otherwise he is making some progress from a therapy standpoint.  No recent care conference.  Does seem to be in pretty good spirits and humor at this time.  Level 3 diet with thin liquids.      Electronically signed by: Michael Duane Johnson, CNP

## 2021-06-18 NOTE — PROGRESS NOTES
Code Status:  POLST AVAILABLE  Visit Type: Discharge Summary     Facility:  [facility] [[id number]]         Facility Type: SNF (Skilled Nursing Facility, TCU)     Date of Service 5/10/18    History of Present Illness: Tariq Pinzon is a 68 y.o. male whom I am seeing today for follow-up on the TCU.  Patiently recently readmitted to the TCU secondary to inability to care for himself at home.  He was discharged a couple weeks ago home with his daughter.  She is 8 months pregnant and is unable to lift patient or provide increased cares.  Patient seen by his primary care provider.  Clinically stable at the time.  It is questionable whether or not he is a candidate for long-term care.  He did readmit to the TCU on O2 at 2L NC. Patient recently hospitalized on 2/15/2018.  He presented to the hospital with left-sided weakness and CT scan showed a subacute/chronic cortical infarct in the right coronary radiata.  CT angiogram was done and showed intracranial atherosclerotic sclerosis and narrowing but no large vessel occlusion.  An MRI showed right pontine infarct as well as small left temporal infarct and right coronary radiata coronary infarct.  He has a history of cerebrovascular disease and had not been taking his medications at home.  He has underlying diabetes.  Poor management.  Underlying COPD hypertension and dyslipidemia.  He is oxygen dependent at home however  he tells me he only wears this at night.  He has left dense hemiparesis.  Some dysphagia as well as aphasia.  He continues in therapy including speech therapy.  No movement on the left.  History of alcohol abuse as well as adjustment disorder with anxiety.     Today pt sitting up in wheelchair.  Yesterday patient toward long-term care bed.  Today we talked about this.  He seems somewhat down now however tends to deny.  We have discussed the use of antidepressant however denies.  Recent increased shortness of breath.  He is now wearing  oxygen all the time at 2 L.  I did increase his steroids.  Less tightness through chest on today's exam.  Continues with occasional wet coarse cough.  Afebrile.  Recently started metformin for diabetes.  Was having diarrhea with Januvia.  Currently denies any diarrhea.    Active Ambulatory Problems     Diagnosis Date Noted     CVA (cerebral vascular accident) (H) 03/19/2018     Left hemiparesis (H) 03/19/2018     Hypertension 03/19/2018     Dysarthria 03/19/2018     Dysphagia 03/19/2018     Diabetes mellitus (H) 03/22/2018     COPD (chronic obstructive pulmonary disease) (H) 04/16/2018     Resolved Ambulatory Problems     Diagnosis Date Noted     No Resolved Ambulatory Problems     Past Medical History:   Diagnosis Date     Abnormality of gait      Acute bronchospasm      Adjustment disorder with anxiety      Alcohol use disorder, mild, abuse      Asthma      CAD (coronary artery disease)      COPD (chronic obstructive pulmonary disease) (H)      CVA (cerebral vascular accident) (H)      DM2 (diabetes mellitus, type 2) (H)      Dysarthria      Dysphagia      HLD (hyperlipidemia)      HTN (hypertension)      Hyperglycemia      Hyperlipidemia      Hypertension      Left hemiparesis (H)      Long-term use of aspirin therapy      Myocardial infarction      Pulmonary nodules      Tobacco abuse        Meds reviewed at the facility.     No Known Allergies      Review of Systems   No fevers or chills. No headache, lightheadedness or dizziness. No SOB, chest pains or palpitations. He continues with occasional coarse cough. Appetite is good. No nausea, vomiting, constipation. No dysuria, frequency, burning or pain with urination.         Physical Exam   PHYSICAL EXAMINATION:  Vital signs:  /81, heart rate 96, respirations 20, 98% on 2 L, temp 96.9.  General: Awake, Alert, oriented x3, appropriately, follows simple commands, conversant  HEENT:PERRLA, Pink conjunctiva, anicteric sclerae, moist oral mucosa.  Slight  left-sided facial droop.  Tongue is midline.    NECK: Supple, with  No lymphadenopathy  CVS:  S1  S2, without murmur or gallop.  Regular rate and rhythm on exam.  LUNG: No wheezes rales or rhonchi. Occasional coarse cough. Off his oxygen on room air.  BACK: No kyphosis of the thoracic spine  ABDOMEN: Soft, nontender to palpation, with positive bowel sounds  EXTREMITIES:   No LE edema.  He is able to move his left lower extremity and kick his foot out.  Move up and down.  Increased movement in the left hand and fingers.  Grasp present.  Some difficulty with overhead raise.  SKIN: Warm and dry.    NEUROLOGIC: Intact, pulses palpable  PSYCHIATRIC: Cognition intact.  Pseudobulbar affect with occasional crying.        Assessment/Plan:  1. CVA (cerebral vascular accident) (H)     2. Left hemiparesis (H)     3. COPD (chronic obstructive pulmonary disease) (H)     4. Diabetes mellitus (H)     5. Pseudobulbar affect         Patient with history of CVA with left-sided hemiparesis.  Recently readmitted secondary to inability to care for himself at home.  Continues in therapy.  Gaining some strength on the left side.  Underlying COPD.  He is steroid-dependent.  Was using oxygen at home mostly for comfort and anxiety. He has resumed wearing oxygen @ 2L NC. I did increase his prednisone to 10mg daily. Today less tight sounding.  He has occasional coarse cough.   Has underlying diabetes type 2.  Blood sugars in the 200s.  He does continue on glipizide and Januvia.  I discontinued his Januvia secondary to loose stools.  I did start metformin. Side effects may include loose stools so we will have to monitor. Continue to monitor blood sugars 4 times daily.  Will need follow-up of his renal function later in the week.  Pseudobulbar affect with occasional outbursts of crying.  Denies any long-term depressive symptoms.  Does have some underlying anxiety.  Denies need for med.    Patient will discharge on Monday to long-term care with  current meds and treatments.  Continue with PT OT and speech therapy.          Electronically signed by: Sarah Smith CNP

## 2021-06-18 NOTE — LETTER
Letter by Sarah Smith CNP at      Author: Sarah Smith CNP Service: -- Author Type: --    Filed:  Encounter Date: 2/18/2019 Status: (Other)         Patient: Tariq Pinzon   MR Number: [id number]   YOB: 1950   Date of Visit: 2/18/2019     Code Status:  Full Code  Visit Type: Problem Visit     Facility:  [facility] [[id number]]         Facility Type: Long Term Care      History of Present Illness: Tariq Pinzon is a 69 y.o. male whom I am seeing today for follow up of respiratory status.   Past medical history includes COPD, CVA with left-sided paresis, hypertension, dysphagia, diabetes type 2, CAD and hypertension.  Patient with recent COPD exacerbation.  He has completed his antibiotic therapy as well as prednisone taper.  Late last week patient having increased cough and congestion.  Follow-up brown mucus per the resident.  He was given.  C[place].  He did require oxygen at 3 L over the weekend.  Patient continues on nebulizers and inhalers.    Today patient sitting up in wheelchair.  He tells me he has been coughing up thick brown tinged sputum.  States overall feels much better now that he was able to cough up and out all this mucus.   Lung sounds are clear. Cough dry. He is a-febrile. He has mx skin lesions to his LE. He has been picking at the areas and they are very red.         Active Ambulatory Problems     Diagnosis Date Noted   ? CVA (cerebral vascular accident) (H) 03/19/2018   ? Left hemiparesis (H) 03/19/2018   ? Hypertension 03/19/2018   ? Dysarthria 03/19/2018   ? Dysphagia 03/19/2018   ? Diabetes mellitus (H) 03/22/2018   ? COPD (chronic obstructive pulmonary disease) (H) 04/16/2018     Resolved Ambulatory Problems     Diagnosis Date Noted   ? No Resolved Ambulatory Problems     Past Medical History:   Diagnosis Date   ? Abnormality of gait    ? Acute bronchospasm    ? Adjustment disorder with anxiety    ? Alcohol use disorder, mild, abuse     ? Asthma    ? CAD (coronary artery disease)    ? COPD (chronic obstructive pulmonary disease) (H)    ? CVA (cerebral vascular accident) (H)    ? DM2 (diabetes mellitus, type 2) (H)    ? Dysarthria    ? Dysphagia    ? HLD (hyperlipidemia)    ? HTN (hypertension)    ? Hyperglycemia    ? Hyperlipidemia    ? Hypertension    ? Left hemiparesis (H)    ? Long-term use of aspirin therapy    ? Myocardial infarction (H)    ? Pulmonary nodules    ? Tobacco abuse        Meds reviewed at the facility.     No Known Allergies      Review of Systems   No fevers or chills. No headache, lightheadedness or dizziness.  Shortness of breath improving.  Occasional use of oxygen.  Continues with occasional cough however much improved.. He feels the nebulizers are helpful. No chest pains or palpitations. Appetite is good.  He is drinking fluids.  No nausea, vomiting, constipation or diarrhea. No dysuria, frequency, burning or pain with urination.  Denies picking at his leg wounds.  Otherwise review of systems are negative.       Physical Exam   PHYSICAL EXAMINATION  Vital signs: /53, P 86, R 24, T 98, O2 sat 94% on room air.  Current weight 142.8pounds.  General: Awake, Alert, oriented x3, appropriately, follows simple commands, conversant. Sitting up in wheelchair.   HEENT: Slight left-sided facial droop.  Tongue is midline.   NECK: Supple, with  No lymphadenopathy  CARDIOVASCULAR: S1-S2 without murmur gallop.  RESPIRATORY: Patient off his oxygen today.  No wheezes rales or rhonchi.  Dry.  ABDOMEN: Soft nondistended.  EXTREMITIES:   Left side hemiparesis.  No lower extremity edema.  Moves all extremities.  SKIN: Multiple skin lesions to lower extremities.  Right leg with redness surrounding lesions.  Appears as the patient has been picking at the area.  Some bleeding noted.  NEUROLOGIC: CVA with left-sided hemiparesis, pulses palpable  PSYCHIATRIC: Cognition intact.  Pleasant affect.        Assessment/Plan:  1. Chronic obstructive  pulmonary disease with acute lower respiratory infection (H)     2. Skin lesion of right leg       Patient with recent increased coughing with increased production of phlegm no shortness of breath.  Continues on nebulizer treatments.  Reported home over the weekend.  He was given [place].  Today off his oxygen.  Lung sounds clear.  Continues with nebulizers and inhalers.  Dry cough on exam.  Refuses prednisone for maintenance.  Skin lesions of the lower extremities with prominent lesions on right leg.  This is wax and wane.  Could be given his diabetes and for prednisone use.  Appears somewhat inflammed.  It appears he has been picking at the areas.  Bactroban twice daily lesions cover with dry sterile dressing as needed.      Electronically signed by: Sarah Smith CNP

## 2021-06-18 NOTE — LETTER
Letter by Sarah Smith CNP at      Author: Sarah Smith CNP Service: -- Author Type: --    Filed:  Encounter Date: 1/3/2019 Status: (Other)         Patient: Tariq Pinzon   MR Number: [id number]   YOB: 1950   Date of Visit: 1/3/2019     Code Status:  POLST AVAILABLE  Visit Type: Problem Visit     Facility:  [facility] [[id number]]         Facility Type: SNF (Skilled Nursing Facility, TCU)     Date of Service 5/10/18    History of Present Illness: Tariq Pinzon is a 68 y.o. male whom I am seeing today for cough. Past medical history includes CVA with left-sided hemiparesis, hypertension, dysphagia, diabetes mellitus type 2 and COPD. Pt with increased coughing he reports over the last week. He is using oxygen more. He has oxygen on @ 2L today. He is shortness of breath at rest with talking. He has coarse wet cough. He is a-febrile.       Active Ambulatory Problems     Diagnosis Date Noted   ? CVA (cerebral vascular accident) (H) 03/19/2018   ? Left hemiparesis (H) 03/19/2018   ? Hypertension 03/19/2018   ? Dysarthria 03/19/2018   ? Dysphagia 03/19/2018   ? Diabetes mellitus (H) 03/22/2018   ? COPD (chronic obstructive pulmonary disease) (H) 04/16/2018     Resolved Ambulatory Problems     Diagnosis Date Noted   ? No Resolved Ambulatory Problems     Past Medical History:   Diagnosis Date   ? Abnormality of gait    ? Acute bronchospasm    ? Adjustment disorder with anxiety    ? Alcohol use disorder, mild, abuse    ? Asthma    ? CAD (coronary artery disease)    ? COPD (chronic obstructive pulmonary disease) (H)    ? CVA (cerebral vascular accident) (H)    ? DM2 (diabetes mellitus, type 2) (H)    ? Dysarthria    ? Dysphagia    ? HLD (hyperlipidemia)    ? HTN (hypertension)    ? Hyperglycemia    ? Hyperlipidemia    ? Hypertension    ? Left hemiparesis (H)    ? Long-term use of aspirin therapy    ? Myocardial infarction (H)    ? Pulmonary nodules    ? Tobacco abuse         Meds reviewed at the facility.     No Known Allergies      Review of Systems   No fevers or chills. No headache, lightheadedness or dizziness. Increased shortness of breath over the last 3 days, He is wearing oxygen more. Increased cough.  No chest pains or palpitations. Appetite is good. No nausea, vomiting, constipation or diarrhea. No dysuria, frequency, burning or pain with urination. Otherwise review of systems are negative.       Physical Exam   PHYSICAL EXAMINATION  Vital signs: BP 99/60, P 88, R 16, T 98.4, O2 sat 99%. Current weight 138.2 pounds.  General: Awake, Alert, oriented x3, appropriately, follows simple commands, conversant. Sitting up in wheelchair.   HEENT: Slight left-sided facial droop.  Tongue is midline.   NECK: Supple, with  No lymphadenopathy  CARDIOVASCULAR: S1-S2 without murmur gallop.  RESPIRATORY: Scattered rhonchi throughout. Wet coarse non productive cough. shortness of breath at rest. Oxygen on @ 2L NC.    EXTREMITIES:   Left side hemiparesis. Contracture to left wrist. He is able to tolerate PROM with wrist and hand.   SKIN: Warm and dry.   NEUROLOGIC: CVA with left-sided hemiparesis, pulses palpable  PSYCHIATRIC: Cognition intact.  Pleasant affect.        Assessment/Plan:  1. Chronic obstructive pulmonary disease with acute lower respiratory infection (H)     2. Cerebrovascular accident (CVA), unspecified mechanism (H)     3. Left hemiparesis (H)       Pt with underlying COPD. He has had increased cough and congestion over the last week. CXR ordered. CXR showed no pulmonary vascular congestion. Peribronchial thickening in the mid and lower lung fields bilaterally consistent with bronchitis. No superimposed focal infiltrate. I did order Doxycycline 100 mg two times a day X 7 days and prednisone 10 mg daily X 5 days then 5 mg X 3 days then d/c. He continues on O2 @ 2L NC and nebulizer treatments.       Electronically signed by: Sarah Smith, JORGE LUIS

## 2021-06-18 NOTE — PROGRESS NOTES
Code Status:  POLST AVAILABLE  Visit Type: Problem Visit     Facility:  [facility] [[id number]]         Facility Type: SNF (Skilled Nursing Facility, TCU)     Date of Service 5/10/18    History of Present Illness: Tariq Pinzon is a 68 y.o. male whom I am seeing today for follow-up on the TCU.  Patient recently hospitalized on 2/15/2018.  He presented to the hospital with left-sided weakness and CT scan showed a subacute/chronic cortical infarct in the right coronary radiata.  CT angiogram was done and showed intracranial atherosclerotic sclerosis and narrowing but no large vessel occlusion.  An MRI showed right pontine infarct as well as small left temporal infarct and right coronary radiata coronary infarct.  He has a history of cerebrovascular disease and had not been taking his medications at home.  He has underlying diabetes.  Poor management.  Underlying COPD hypertension and dyslipidemia.  He is oxygen dependent at home however  he tells me he only wears this at night.  He has left dense hemiparesis.  Some dysphagia as well as aphasia.  He continues in therapy including speech therapy.  No movement on the left.  History of alcohol abuse as well as adjustment disorder with anxiety.     Today pt sitting up in wheelchair with oxygen on.  Reports increased shortness of breath today.  Oxygen on 2 L nasal cannula.  He does wear oxygen at home.  Continues on prednisone taper.  Increased production of thick yellow phlegm today.  Continues with wet nonproductive cough.  Phlegm almost chokes patients at times during visit.  Continues on Mucinex 4 times daily.  He has refused nebulizer treatments.  He is on 2 inhalers.  Currently afebrile.  He was to discharge home today however given this change in condition as well as out side conditions high pollen, humidity would like to keep and observe.      Active Ambulatory Problems     Diagnosis Date Noted     CVA (cerebral vascular accident) 03/19/2018      Left hemiparesis 03/19/2018     Hypertension 03/19/2018     Dysarthria 03/19/2018     Dysphagia 03/19/2018     Diabetes mellitus 03/22/2018     COPD (chronic obstructive pulmonary disease) 04/16/2018     Resolved Ambulatory Problems     Diagnosis Date Noted     No Resolved Ambulatory Problems     Past Medical History:   Diagnosis Date     Abnormality of gait      Acute bronchospasm      Adjustment disorder with anxiety      Alcohol use disorder, mild, abuse      CAD (coronary artery disease)      COPD (chronic obstructive pulmonary disease)      CVA (cerebral vascular accident)      DM2 (diabetes mellitus, type 2)      Dysarthria      Dysphagia      HLD (hyperlipidemia)      HTN (hypertension)      Hyperglycemia      Left hemiparesis        Meds reviewed at the facility.     No Known Allergies      Review of Systems   Refer to HPI otherwise negative.    Physical Exam   PHYSICAL EXAMINATION:  Vital signs:  /83  Pulse (!) 102  Temp 97.6  F (36.4  C)  Resp 18  Wt 152 lb 3.2 oz (69 kg)  SpO2 97%    General: Awake, Alert, oriented x3, appropriately, follows simple commands, conversant  HEENT:PERRLA, Pink conjunctiva, anicteric sclerae, moist oral mucosa.  Slight left-sided facial droop.  Tongue is midline.    NECK: Supple, with  No lymphadenopathy  CVS:  S1  S2, without murmur or gallop.  Regular rate and rhythm on exam.  LUNG: On oxygen at 2 L nasal cannula.  Somewhat dyspneic at rest.  Increased production of thick yellow to white phlegm.  Wet nonproductive cough.  Crackles in the lower lobes.  Difficulty clearing phlegm at times almost becomes choked on it.  BACK: No kyphosis of the thoracic spine  ABDOMEN: Soft, nontender to palpation, with positive bowel sounds  : No irritation at penial tip. No blood noted in pad.   EXTREMITIES:   No LE edema.  He is able to move his left lower extremity kicked his foot out.  Move up and down.  Increased movement in the left hand and fingers.  Grasp present.  Some  difficulty with overhead raise.   SKIN: Warm and dry.  Scabbed areas to right lower extremity.  Some bruising surrounding.  NEUROLOGIC: Intact, pulses palpable  PSYCHIATRIC: Cognition intact.  Pseudobulbar affect with occasional crying.        Assessment/Plan:  1. COPD (chronic obstructive pulmonary disease)     2. COPD exacerbation     3. Diabetes mellitus       Patient with underlying COPD now and acute COPD exacerbation.  Today he is back on oxygen at 2 L nasal cannula with some increased shortness of breath and increased production of thick yellow sputum white mucus.  Continues on a burst of prednisone with taper.  Currently at 20 mg daily.  He refuses nebulizers.  Continues on to inhalers.  Is currently afebrile.  He does become choked on sputum.  Continues on Mucinex 4 times daily.  Patient was to discharge home.  Patient to stay until early next week.  We will continue with steroids I will add antibiotics for acute exacerbation.  Patient will need home oxygen 2 L nasal cannula continuously.  We will also culture sputum.      Electronically signed by: Sarah Smith, JORGE LUIS

## 2021-06-18 NOTE — LETTER
Letter by Sarah Smith CNP at      Author: Sarah Smith CNP Service: -- Author Type: --    Filed:  Encounter Date: 2/13/2019 Status: (Other)         Patient: Tariq Pinzon   MR Number: [id number]   YOB: 1950   Date of Visit: 2/13/2019     Code Status:  Full Code  Visit Type: Problem Visit     Facility:  [facility] [[id number]]         Facility Type: Long Term Care      History of Present Illness: Tariq Pinzon is a 69 y.o. male whom I am seeing today for pharmacy recommendation regarding mx inhalers and nebs.  Past medical history includes COPD, CVA with left-sided paresis, hypertension, dysphagia, diabetes type 2, CAD and hypertension.  Patient with recent COPD exacerbation.  He has completed his antibiotic therapy as well as prednisone taper.  Pharmacy recommendation asked to review current orders.  He has multiple as needed nebulizers as well as inhalers including Atrovent, ipratropium, albuterol and Advair Diskus.  Patient is very reluctant to take medication.  He does prefer inhalers however he continues with occasional wet nonproductive coarse cough.  He will not take more steroids for maintenance.  Has been asking for nebs in the evening.  I did discuss this at length with him.  If he would take the DuoNeb it would have both the albuterol and ipratropium therefore we could reduce some of the different medications.  He will continue to need Advair Diskus.        Active Ambulatory Problems     Diagnosis Date Noted   ? CVA (cerebral vascular accident) (H) 03/19/2018   ? Left hemiparesis (H) 03/19/2018   ? Hypertension 03/19/2018   ? Dysarthria 03/19/2018   ? Dysphagia 03/19/2018   ? Diabetes mellitus (H) 03/22/2018   ? COPD (chronic obstructive pulmonary disease) (H) 04/16/2018     Resolved Ambulatory Problems     Diagnosis Date Noted   ? No Resolved Ambulatory Problems     Past Medical History:   Diagnosis Date   ? Abnormality of gait    ? Acute  bronchospasm    ? Adjustment disorder with anxiety    ? Alcohol use disorder, mild, abuse    ? Asthma    ? CAD (coronary artery disease)    ? COPD (chronic obstructive pulmonary disease) (H)    ? CVA (cerebral vascular accident) (H)    ? DM2 (diabetes mellitus, type 2) (H)    ? Dysarthria    ? Dysphagia    ? HLD (hyperlipidemia)    ? HTN (hypertension)    ? Hyperglycemia    ? Hyperlipidemia    ? Hypertension    ? Left hemiparesis (H)    ? Long-term use of aspirin therapy    ? Myocardial infarction (H)    ? Pulmonary nodules    ? Tobacco abuse        Meds reviewed at the facility.     No Known Allergies      Review of Systems   No fevers or chills. No headache, lightheadedness or dizziness.  Patient again presenting with shortness of breath with exertion.  He is wearing oxygen again.  Continues with occasional cough. He feels the nebulizers are helpful. No chest pains or palpitations. Appetite is good.  He is drinking fluids.  No nausea, vomiting, constipation or diarrhea. No dysuria, frequency, burning or pain with urination. Otherwise review of systems are negative.       Physical Exam   PHYSICAL EXAMINATION  Vital signs: BP 89/51, pulse 76, respirations 20, temperature 98.3, O2 sat 95% on room air.  Current weight 142.8pounds.  General: Awake, Alert, oriented x3, appropriately, follows simple commands, conversant. Sitting up in wheelchair.   HEENT: Slight left-sided facial droop.  Tongue is midline.   NECK: Supple, with  No lymphadenopathy  CARDIOVASCULAR: S1-S2 without murmur gallop.  RESPIRATORY: Patient off his oxygen today. Occasional coarse wet nonproductive cough. No wheezes, rales or rhonchi.    EXTREMITIES:   Left side hemiparesis.   SKIN: Warm and dry.   NEUROLOGIC: CVA with left-sided hemiparesis, pulses palpable  PSYCHIATRIC: Cognition intact.  Pleasant affect.        Assessment/Plan:  1. Chronic obstructive pulmonary disease with acute lower respiratory infection (H)       Patient with multiple  different foot drug regimen for his COPD.  Patient will continue on Advair Diskus 1 puff twice daily.  I will DC his pro-air.  I will DC his albuterol nebs.  Will institute ipratropium albuterol nebs twice daily scheduled as well as every 4 hours as needed.  I will DC his ipratropium inhaler.      Electronically signed by: Sarah Smith, CNP

## 2021-06-18 NOTE — LETTER
Letter by Sarah Smith CNP at      Author: Sarah Smith CNP Service: -- Author Type: --    Filed:  Encounter Date: 1/16/2019 Status: (Other)         Patient: Tariq Pinzon   MR Number: [id number]   YOB: 1950   Date of Visit: 1/16/2019     Code Status:  Full Code  Visit Type: Problem Visit     Facility:  [facility] [[id number]]         Facility Type: Long Term Care    Date of Service 5/10/18    History of Present Illness: Tariq Pinzon is a 68 y.o. male whom I am seeing today for follow-up of COPD exacerbation.  Patient recently seen and treated for acute on chronic COPD with exacerbation.  Patient continues on doxycycline as well as prednisone taper.  Today he is awaiting today a haircut.  He is off his oxygen.  No dyspnea at rest.  Slight dyspnea with talking.  Continues with a very wet coarse nonproductive cough.  Afebrile.  He is eating and drinking well.  Reports some difficulty expelling mucus.  Continues on nebulizers 4 times daily.      Active Ambulatory Problems     Diagnosis Date Noted   ? CVA (cerebral vascular accident) (H) 03/19/2018   ? Left hemiparesis (H) 03/19/2018   ? Hypertension 03/19/2018   ? Dysarthria 03/19/2018   ? Dysphagia 03/19/2018   ? Diabetes mellitus (H) 03/22/2018   ? COPD (chronic obstructive pulmonary disease) (H) 04/16/2018     Resolved Ambulatory Problems     Diagnosis Date Noted   ? No Resolved Ambulatory Problems     Past Medical History:   Diagnosis Date   ? Abnormality of gait    ? Acute bronchospasm    ? Adjustment disorder with anxiety    ? Alcohol use disorder, mild, abuse    ? Asthma    ? CAD (coronary artery disease)    ? COPD (chronic obstructive pulmonary disease) (H)    ? CVA (cerebral vascular accident) (H)    ? DM2 (diabetes mellitus, type 2) (H)    ? Dysarthria    ? Dysphagia    ? HLD (hyperlipidemia)    ? HTN (hypertension)    ? Hyperglycemia    ? Hyperlipidemia    ? Hypertension    ? Left hemiparesis (H)    ?  Long-term use of aspirin therapy    ? Myocardial infarction (H)    ? Pulmonary nodules    ? Tobacco abuse        Meds reviewed at the facility.     No Known Allergies      Review of Systems   No fevers or chills. No headache, lightheadedness or dizziness.  Patient again presenting with shortness of breath with exertion.  He is wearing oxygen again.  Continues with cough.  He is having difficulty expelling mucus.  He feels the nebulizers are helpful. No chest pains or palpitations. Appetite is good.  He is drinking fluids.  No nausea, vomiting, constipation or diarrhea. No dysuria, frequency, burning or pain with urination. Otherwise review of systems are negative.       Physical Exam   PHYSICAL EXAMINATION  Vital signs: /66, heart rate 75, respirations 16, temperature 97.6, O2 sat 100% on 2 L.  Current weight 138.2 pounds.  General: Awake, Alert, oriented x3, appropriately, follows simple commands, conversant. Sitting up in wheelchair.   HEENT: Slight left-sided facial droop.  Tongue is midline.   NECK: Supple, with  No lymphadenopathy  CARDIOVASCULAR: S1-S2 without murmur gallop.  RESPIRATORY: Patient off his oxygen today.  No dyspnea at rest.  Continues with coarse wet nonproductive cough.  Expiratory wheezing noted.   EXTREMITIES:   Left side hemiparesis.   SKIN: Warm and dry.   NEUROLOGIC: CVA with left-sided hemiparesis, pulses palpable  PSYCHIATRIC: Cognition intact.  Pleasant affect.        Assessment/Plan:  1. Chronic obstructive pulmonary disease with acute lower respiratory infection (H)       Acute on chronic COPD with recurrent exacerbation.  He continues on doxycycline.  Continues on prednisone taper.  He is off his oxygen today.  He does wear this at night.  Less dyspnea at rest.  Continues with coarse wet cough.  Difficulty expelling sputum.  Will order [place] 10 cc p.o. every 4 hours as needed.  He continues with nebulizers 4 times daily.    Electronically signed by: Sarah Smith,  CNP

## 2021-06-18 NOTE — PROGRESS NOTES
Code Status:  POLST AVAILABLE  Visit Type: Problem Visit     Facility:  [facility] [[id number]]         Facility Type: SNF (Skilled Nursing Facility, TCU)     Date of Service 5/10/18    History of Present Illness: Tariq Pinzon is a 68 y.o. male whom I am seeing today for follow-up on the TCU.  Patiently recently readmitted to the TCU secondary to inability to care for himself at home.  He was discharged a couple weeks ago home with his daughter.  She is 8 months pregnant and is unable to lift patient or provide increased cares.  Patient seen by his primary care provider.  Clinically stable at the time.  It is questionable whether or not he is a candidate for long-term care.  He did readmit to the TCU on O2 at 2L NC.     Patient recently hospitalized on 2/15/2018.  He presented to the hospital with left-sided weakness and CT scan showed a subacute/chronic cortical infarct in the right coronary radiata.  CT angiogram was done and showed intracranial atherosclerotic sclerosis and narrowing but no large vessel occlusion.  An MRI showed right pontine infarct as well as small left temporal infarct and right coronary radiata coronary infarct.  He has a history of cerebrovascular disease and had not been taking his medications at home.  He has underlying diabetes.  Poor management.  Underlying COPD hypertension and dyslipidemia.  He is oxygen dependent at home however  he tells me he only wears this at night.  He has left dense hemiparesis.  Some dysphagia as well as aphasia.  He continues in therapy including speech therapy.  No movement on the left.  History of alcohol abuse as well as adjustment disorder with anxiety.     Today pt sitting up in wheelchair. He is per his usual. Today he is off his oxygen. He continues with occasional coarse cough. No dyspnea at rest. He continues with left sided paresis. He contributes his loose stools to Junuvia. He has refused this for 3 days. His blood sugars in  the 200s. He states loose stools have now subsided. Nursing staff report depressive symptoms. He does pseudobulbar affect with occasional outbreaks of crying. He denies any depressive symptoms. He also denies any need for med. He continues to joke around.       Active Ambulatory Problems     Diagnosis Date Noted     CVA (cerebral vascular accident) (H) 03/19/2018     Left hemiparesis (H) 03/19/2018     Hypertension 03/19/2018     Dysarthria 03/19/2018     Dysphagia 03/19/2018     Diabetes mellitus (H) 03/22/2018     COPD (chronic obstructive pulmonary disease) (H) 04/16/2018     Resolved Ambulatory Problems     Diagnosis Date Noted     No Resolved Ambulatory Problems     Past Medical History:   Diagnosis Date     Abnormality of gait      Acute bronchospasm      Adjustment disorder with anxiety      Alcohol use disorder, mild, abuse      Asthma      CAD (coronary artery disease)      COPD (chronic obstructive pulmonary disease) (H)      CVA (cerebral vascular accident) (H)      DM2 (diabetes mellitus, type 2) (H)      Dysarthria      Dysphagia      HLD (hyperlipidemia)      HTN (hypertension)      Hyperglycemia      Hyperlipidemia      Hypertension      Left hemiparesis (H)      Long-term use of aspirin therapy      Myocardial infarction      Pulmonary nodules      Tobacco abuse        Meds reviewed at the facility.     No Known Allergies      Review of Systems   No fevers or chills. No headache, lightheadedness or dizziness. No SOB, chest pains or palpitations. He continues with occasional coarse cough. Appetite is good. No nausea, vomiting, constipation. Diarrhea is improving. No dysuria, frequency, burning or pain with urination.         Physical Exam   PHYSICAL EXAMINATION:  Vital signs:  /69, HR 70, R 16, T 97.6. Weight 147.4 lbs.   General: Awake, Alert, oriented x3, appropriately, follows simple commands, conversant  HEENT:PERRLA, Pink conjunctiva, anicteric sclerae, moist oral mucosa.  Slight left-sided  facial droop.  Tongue is midline.    NECK: Supple, with  No lymphadenopathy  CVS:  S1  S2, without murmur or gallop.  Regular rate and rhythm on exam.  LUNG: No wheezes rales or rhonchi. Occasional coarse cough. Off his oxygen on room air.  BACK: No kyphosis of the thoracic spine  ABDOMEN: Soft, nontender to palpation, with positive bowel sounds  EXTREMITIES:   No LE edema.  He is able to move his left lower extremity and kick his foot out.  Move up and down.  Increased movement in the left hand and fingers.  Grasp present.  Some difficulty with overhead raise.  SKIN: Warm and dry.    NEUROLOGIC: Intact, pulses palpable  PSYCHIATRIC: Cognition intact.  Pseudobulbar affect with occasional crying.        Assessment/Plan:  1. CVA (cerebral vascular accident) (H)     2. Left hemiparesis (H)     3. Diabetes mellitus (H)     4. COPD (chronic obstructive pulmonary disease) (H)     5. Pseudobulbar affect         Patient with history of CVA with left-sided hemiparesis.  Recently readmitted secondary to inability to care for himself at home.  Continues in therapy.  Gaining some strength on the left side.  Underlying COPD.  He is steroid-dependent.  Was using oxygen at home mostly for comfort and anxiety.  Currently off oxygen today O2 sat 94% on room air.  He has occasional coarse cough.  I will increase his prednisone 5-10.  Has underlying diabetes type 2.  Blood sugars in the 200s.  He does continue on glipizide and Januvia.  He is now refusing his Januvia the last 3 days secondary to loose stools.  States loose stools are subsiding since being off the Januvia.  Will start Metformin 500 mg twice daily.  Side effects may include loose stools so we will have to monitor.  Will need follow-up of his renal function later in the week.  Pseudobulbar affect with occasional outbursts of crying.  Denies any long-term depressive symptoms.  Does have some underlying anxiety.  Denies need for med.          Electronically signed by:  Sarah Smith, CNP

## 2021-06-18 NOTE — PROGRESS NOTES
[facility]      Facility:    [facility] [[id number]]  Code Status: UNKNOWN      Chief Complaint/Reason for Visit:  Chief Complaint   Patient presents with     H & P     Readmission to the [facility] secondary to unable to care for himself at home and needing more therapies.  He recent CVA with left-sided hemiparesis.  Hypertension, COPD with chronic steroid use, type 2 diabetes, non-ST segment MI with 2 vessel coronary disease requiring drug-eluting stent.       HPI:   Tariq is a 68 y.o. male who residing at the [facility] after CVA with left hemiparesis.  He did when he was discharged to his home in the care of his family and was there for a couple of weeks.  He then returned to his primary care doctor's office on 6/6/2018.  At that time he felt he was not doing well at home and wanted to go back to [facility] to help with his ADLs with physical and Occupational Therapy.  He feels that his left-sided weakness is worsening and since he had returned home.  His daughter happens to be 8 months pregnant and cannot do any heavy lifting.  In the clinic that they blood pressure was pretty much stable and temperature was stable.  His pulse was 84 and it was decided to transfer back here to the TCU [facility] which he arrived in stable condition.    Patient does have minimal movement of his of his left side at this time is moving her bowels okay and he does not get short of breath on chronic oxygen secondary to his COPD.  He did tell me the main reason why is here because his daughter who is his caretaker is 8 months pregnant.  He may be candidate for long-term care at this time and we will evaluate him with physical and Occupational Therapy.  Otherwise he has no new concerns today he is moving his bowels well and has no other concerns.    Past Medical History:  Past Medical History:   Diagnosis Date      Abnormality of gait      Acute bronchospasm      Adjustment disorder with anxiety      Alcohol use disorder, mild, abuse      Asthma      CAD (coronary artery disease)      COPD (chronic obstructive pulmonary disease)     with emphysema     CVA (cerebral vascular accident)      DM2 (diabetes mellitus, type 2)      Dysarthria      Dysphagia      HLD (hyperlipidemia)      HTN (hypertension)      Hyperglycemia      Hyperlipidemia      Hypertension      Left hemiparesis      Long-term use of aspirin therapy      Myocardial infarction     NSTEMI     Pulmonary nodules     left     Tobacco abuse            Surgical History:  History reviewed. No pertinent surgical history.    Family History:   Family History   Problem Relation Age of Onset     Diabetes Mother      Asthma Mother      Lung cancer Father      Asthma Maternal Grandmother        Social History:    Social History     Social History     Marital status:      Spouse name: N/A     Number of children: N/A     Years of education: N/A     Social History Main Topics     Smoking status: Former Smoker     Packs/day: 2.00     Years: 38.00     Smokeless tobacco: Former User     Alcohol use No     Drug use: None     Sexual activity: Not Asked     Other Topics Concern     None     Social History Narrative          Review of Systems   Constitutional:        Patient says his left-sided weakness has gotten somewhat better and he does have some movement in his hand and he can move his leg somewhat.  I am unsure where he was when he left the TCU.  Patient denies any pain fevers chills nausea vomiting diarrhea change in vision hearing taste or smell weakness one side the other chest pain or shortness of breath.  The remainder the review of systems is negative.       Vitals:    06/08/18 0732   BP: 113/72   Pulse: 63   Resp: 18   Temp: (!) 93.8  F (34.3  C)   SpO2: 99%       Physical Exam   Constitutional: No distress.   HENT:   Head: Normocephalic and atraumatic.   Eyes:  Conjunctivae are normal. Right eye exhibits no discharge. Left eye exhibits no discharge.   Neck: No thyromegaly present.   Cardiovascular: Normal rate and regular rhythm.    Pulmonary/Chest: Effort normal. No respiratory distress. He has wheezes. He has no rales. He exhibits no tenderness.   Abdominal: Soft. Bowel sounds are normal. He exhibits distension. There is no tenderness. There is no rebound.   Musculoskeletal: He exhibits no edema or tenderness.   Neurological:   Patient has minimal movement in his left hand but he has good sensation.  He can move his leg up and down but cannot wiggle his toes or dorsiflex on the left side right side is normal and he still has a left facial droop.   Skin: Skin is warm and dry. He is not diaphoretic.   Psychiatric: He has a normal mood and affect. His behavior is normal.       Medication List:  Current Outpatient Prescriptions   Medication Sig     albuterol (PROAIR HFA;PROVENTIL HFA;VENTOLIN HFA) 90 mcg/actuation inhaler Inhale 2 puffs every 4 (four) hours.      aspirin 325 MG EC tablet Take 325 mg by mouth daily.     atorvastatin (LIPITOR) 40 MG tablet Take 40 mg by mouth at bedtime.     fluticasone-salmeterol (ADVAIR) 250-50 mcg/dose DISKUS Inhale 1 puff 2 (two) times a day.     glipiZIDE (GLUCOTROL XL) 10 MG 24 hr tablet Take 10 mg by mouth 2 (two) times a day. And 5 mg at noon      ipratropium (ATROVENT HFA) 17 mcg/actuation inhaler Inhale 2 puffs every 6 (six) hours.     lisinopril (PRINIVIL,ZESTRIL) 20 MG tablet Take 20 mg by mouth daily.     metoprolol tartrate (LOPRESSOR) 25 MG tablet Take 25 mg by mouth 2 (two) times a day.     nitroglycerin (NITROSTAT) 0.4 MG SL tablet Place 0.4 mg under the tongue every 5 (five) minutes as needed for chest pain.     omeprazole (PRILOSEC OTC) 20 MG tablet Take 20 mg by mouth daily.     predniSONE (DELTASONE) 10 mg tablet Take 5 mg by mouth daily. Patient taking 15 mg started today ×5 days then should reduce to 10 mg ×5 days then 5  mg daily indefinitely.      sitaGLIPtin (JANUVIA) 100 MG tablet Take 100 mg by mouth daily.       Labs: Recent laboratory values in the transitional care were done on 5/21/2018 with a CBC as follows White count was 11.9, heme hemoglobin was 12.5, platelets are 285,000.  On 5/9/2018 PSA basic metabolic profile brain natruretic peptide were all within normal limits.      Assessment:    ICD-10-CM    1. CVA (cerebral vascular accident) I63.9    2. Left hemiparesis G81.94    3. Diabetes mellitus E11.9    4. COPD (chronic obstructive pulmonary disease) J44.9    5. Dysphagia R13.10    6. Hypertension I10        Plan: Plan at this time will continue meds as ordered at this time and he does have hypertension however his blood pressure is in adequate control at this time I will check his blood sugars 4 times daily at this time and it does not appear that his left-sided hemiparesis has changed all that much is chronic obstructive pulmonary disease is about the same however I will have him evaluated by physical and occupational therapy today.  He will need to follow-up with neurology as soon as possible and blood sugars will be done 4 times daily.  I will continue to monitor above medical problems and no other changes to care plan at this time.        Electronically signed by: Elver Murphy DO

## 2021-06-18 NOTE — LETTER
Letter by Sarah Smith CNP at      Author: Sarah Smith CNP Service: -- Author Type: --    Filed:  Encounter Date: 1/21/2019 Status: (Other)         Patient: Tariq Pinzon   MR Number: [id number]   YOB: 1950   Date of Visit: 1/21/2019     Code Status:  Full Code  Visit Type: Problem Visit     Facility:  [facility] [[id number]]         Facility Type: Long Term Care    Date of Service 5/10/18    History of Present Illness: Tariq Pinzon is a 68 y.o. male whom I am seeing today for follow-up of COPD exacerbation.  Patient recently seen and treated for acute on chronic COPD with exacerbation.  Patient continues on doxycycline as well as prednisone taper.  He is off his oxygen.  Continues on nebulizer treatments.  Continues with occasional coarse cough.  Expiratory wheezing resolved.  Bilateral lower lobes.  Continues with overall fatigue.  Steroid use.  Blood sugars occasionally elevated in the 200s.  Currently on glipizide.    Active Ambulatory Problems     Diagnosis Date Noted   ? CVA (cerebral vascular accident) (H) 03/19/2018   ? Left hemiparesis (H) 03/19/2018   ? Hypertension 03/19/2018   ? Dysarthria 03/19/2018   ? Dysphagia 03/19/2018   ? Diabetes mellitus (H) 03/22/2018   ? COPD (chronic obstructive pulmonary disease) (H) 04/16/2018     Resolved Ambulatory Problems     Diagnosis Date Noted   ? No Resolved Ambulatory Problems     Past Medical History:   Diagnosis Date   ? Abnormality of gait    ? Acute bronchospasm    ? Adjustment disorder with anxiety    ? Alcohol use disorder, mild, abuse    ? Asthma    ? CAD (coronary artery disease)    ? COPD (chronic obstructive pulmonary disease) (H)    ? CVA (cerebral vascular accident) (H)    ? DM2 (diabetes mellitus, type 2) (H)    ? Dysarthria    ? Dysphagia    ? HLD (hyperlipidemia)    ? HTN (hypertension)    ? Hyperglycemia    ? Hyperlipidemia    ? Hypertension    ? Left hemiparesis (H)    ? Long-term use of  aspirin therapy    ? Myocardial infarction (H)    ? Pulmonary nodules    ? Tobacco abuse        Meds reviewed at the facility.     No Known Allergies      Review of Systems   No fevers or chills. No headache, lightheadedness or dizziness.  Patient again presenting with shortness of breath with exertion.  He is wearing oxygen again.  Continues with occasional cough. He feels the nebulizers are helpful. No chest pains or palpitations. Appetite is good.  He is drinking fluids.  No nausea, vomiting, constipation or diarrhea. No dysuria, frequency, burning or pain with urination. Otherwise review of systems are negative.       Physical Exam   PHYSICAL EXAMINATION  Vital signs: /69, pulse 73, respirations 16, O2 sat 95% on room air.  Temperature 97.9.  Current weight 138.2 pounds.  General: Awake, Alert, oriented x3, appropriately, follows simple commands, conversant. Sitting up in wheelchair.   HEENT: Slight left-sided facial droop.  Tongue is midline.   NECK: Supple, with  No lymphadenopathy  CARDIOVASCULAR: S1-S2 without murmur gallop.  RESPIRATORY: Patient off his oxygen today. Occasional coarse wet nonproductive cough. Crackles in BLL. Wheezing resolved.    EXTREMITIES:   Left side hemiparesis.   SKIN: Warm and dry.   NEUROLOGIC: CVA with left-sided hemiparesis, pulses palpable  PSYCHIATRIC: Cognition intact.  Pleasant affect.        Assessment/Plan:  1. Chronic obstructive pulmonary disease with acute lower respiratory infection (H)     2. Cerebrovascular accident (CVA), unspecified mechanism (H)     3. Type 2 diabetes mellitus with complication, without long-term current use of insulin (H)       Acute on chronic COPD with recurrent exacerbation.  He continues on doxycycline and prednisone taper.  Currently using nebulizers 4 times daily.  Also as needed guaifenesin.  Cough improving.  Expiratory wheezing resolved.  He does have crackles in bilateral lower lobes.  Steroid-induced hyperglycemia.  Continues on  glipizide.  Blood sugars with occasional 200s.  Will initiate sliding scale.  He does have underlying CVA.    Electronically signed by: Sarah Smith, JORGE LUIS

## 2021-06-18 NOTE — PROGRESS NOTES
Code Status:  POLST AVAILABLE  Visit Type: Problem Visit     Facility:  [facility] [[id number]]         Facility Type: SNF (Skilled Nursing Facility, TCU)     Date of Service 5/10/18    History of Present Illness: Tariq Pinzon is a 68 y.o. male whom I am seeing today for follow-up on the TCU.  Patient recently hospitalized on 2/15/2018.  He presented to the hospital with left-sided weakness and CT scan showed a subacute/chronic cortical infarct in the right coronary radiata.  CT angiogram was done and showed intracranial atherosclerotic sclerosis and narrowing but no large vessel occlusion.  An MRI showed right pontine infarct as well as small left temporal infarct and right coronary radiata coronary infarct.  He has a history of cerebrovascular disease and had not been taking his medications at home.  He has underlying diabetes.  Poor management.  Underlying COPD hypertension and dyslipidemia.  He is oxygen dependent at home however  he tells me he only wears this at night.  He has left dense hemiparesis.  Some dysphagia as well as aphasia.  He continues in therapy including speech therapy.  No movement on the left.  History of alcohol abuse as well as adjustment disorder with anxiety.     Today pt sitting up in wheelchair.  Denies any shortness of breath.  He is off his oxygen.  He does continue with wet nonproductive cough.  He is unable to expel phlegm.  Refuses nebulizers.  Continues on multiple inhalers.  Is on Mucinex for thinning of secretions.  Continues on a prednisone taper.  Continues with some increased movement on the left side.      Active Ambulatory Problems     Diagnosis Date Noted     CVA (cerebral vascular accident) 03/19/2018     Left hemiparesis 03/19/2018     Hypertension 03/19/2018     Dysarthria 03/19/2018     Dysphagia 03/19/2018     Diabetes mellitus 03/22/2018     COPD (chronic obstructive pulmonary disease) 04/16/2018     Resolved Ambulatory Problems     Diagnosis  Date Noted     No Resolved Ambulatory Problems     Past Medical History:   Diagnosis Date     Abnormality of gait      Acute bronchospasm      Adjustment disorder with anxiety      Alcohol use disorder, mild, abuse      CAD (coronary artery disease)      COPD (chronic obstructive pulmonary disease)      CVA (cerebral vascular accident)      DM2 (diabetes mellitus, type 2)      Dysarthria      Dysphagia      HLD (hyperlipidemia)      HTN (hypertension)      Hyperglycemia      Left hemiparesis        Meds reviewed at the facility.     No Known Allergies      Review of Systems   No fevers or chills. No headache, lightheadedness or dizziness. Pt with decreased SOB.  Is not requiring oxygen.  Continues with occasional wet non productive cough. No chest pains or palpitations. Appetite is good.Denies any dysphagia.  No nausea, vomiting, constipation or diarrhea.  No frequency, burning or pain.  He cries occasionally consistent with pseudobulbar affect.  Denies any medication.    Physical Exam   PHYSICAL EXAMINATION:  Vital signs:  /80  Pulse 66  Temp 97.8  F (36.6  C)  Resp 16  SpO2 96%    General: Awake, Alert, oriented x3, appropriately, follows simple commands, conversant  HEENT:PERRLA, Pink conjunctiva, anicteric sclerae, moist oral mucosa.  Slight left-sided facial droop.  Tongue is midline.    NECK: Supple, with  No lymphadenopathy  CVS:  S1  S2, without murmur or gallop.  Regular rate and rhythm on exam.  LUNG:Off his oxygen today. No dyspnea at rest. Occasional wet non productive cough.   BACK: No kyphosis of the thoracic spine  ABDOMEN: Soft, nontender to palpation, with positive bowel sounds  : No irritation at penial tip. No blood noted in pad.   EXTREMITIES:   No LE edema.  He is able to move his left lower extremity kicked his foot out.  Move up and down.  Increased movement in the left hand and fingers.  Grasp present.  Some difficulty with overhead raise.   SKIN: Warm and dry.  Scabbed areas to  right lower extremity.  Some bruising surrounding.  NEUROLOGIC: Intact, pulses palpable  PSYCHIATRIC: Cognition intact.  Pseudobulbar affect with occasional crying.        Assessment/Plan:  1. CVA (cerebral vascular accident)     2. Left hemiparesis     3. COPD (chronic obstructive pulmonary disease)     4. Diabetes mellitus        CVA with left-sided hemiparesis.  He is gaining strength in the left side.  Increased movement on the left lower extremity.  Underlying COPD.  Continues on prednisone taper.  Breathing improved.  No longer on oxygen.  Does continue with occasional wet nonproductive cough.  Difficulty clearing secretions.  Continues on Mucinex twice daily.  Will increase this to 4 times a day.  He has refused nebulizers.  Continues on multiple inhalers.  Diabetes.  Elevated blood sugars secondary to prednisone burst.  I did start him on sensitive sliding scale.  Blood sugars occasionally in the 200s.  He does continue on oral anti-hyperglycemics as well.  Will attempt to DC sliding scale before discharge.      Electronically signed by: Sarah Smith, JORGE LUIS

## 2021-06-18 NOTE — PROGRESS NOTES
Code Status:  POLST AVAILABLE  Visit Type: Discharge Summary     Facility:  [facility] [[id number]]         Facility Type: SNF (Skilled Nursing Facility, TCU)     Date of Service 5/10/18    History of Present Illness: Tariq Pinzon is a 68 y.o. male whom I am seeing today for follow-up on the TCU.  Patient recently hospitalized on 2/15/2018.  He presented to the hospital with left-sided weakness and CT scan showed a subacute/chronic cortical infarct in the right coronary radiata.  CT angiogram was done and showed intracranial atherosclerotic sclerosis and narrowing but no large vessel occlusion.  An MRI showed right pontine infarct as well as small left temporal infarct and right coronary radiata coronary infarct.  He has a history of cerebrovascular disease and had not been taking his medications at home.  He has underlying diabetes.  Poor management.  Underlying COPD hypertension and dyslipidemia.  He is oxygen dependent at home however  he tells me he only wears this at night.  He has left dense hemiparesis.  Some dysphagia as well as aphasia.  He continues in therapy including speech therapy.  No movement on the left.  History of alcohol abuse as well as adjustment disorder with anxiety.     Today pt sitting up in wheelchair.  He continues on Levaquin and prednisone taper.  Cough improved.  No longer on oxygen.  O2 sats 94% with activity on room air.  I have been told he has oxygen concentrator at home as needed.  He will need prednisone for maintenance.  Continues with left-sided hemiplegia.  Increased movement of the lower extremity.  Diabetes.  Blood sugar slightly elevated secondary to steroid use.      Active Ambulatory Problems     Diagnosis Date Noted     CVA (cerebral vascular accident) 03/19/2018     Left hemiparesis 03/19/2018     Hypertension 03/19/2018     Dysarthria 03/19/2018     Dysphagia 03/19/2018     Diabetes mellitus 03/22/2018     COPD (chronic obstructive pulmonary  disease) 04/16/2018     Resolved Ambulatory Problems     Diagnosis Date Noted     No Resolved Ambulatory Problems     Past Medical History:   Diagnosis Date     Abnormality of gait      Acute bronchospasm      Adjustment disorder with anxiety      Alcohol use disorder, mild, abuse      CAD (coronary artery disease)      COPD (chronic obstructive pulmonary disease)      CVA (cerebral vascular accident)      DM2 (diabetes mellitus, type 2)      Dysarthria      Dysphagia      HLD (hyperlipidemia)      HTN (hypertension)      Hyperglycemia      Left hemiparesis        Meds reviewed at the facility.     No Known Allergies      Review of Systems   No fevers or chills. No headache, lightheadedness or dizziness. No SOB, chest pains or palpitations. Appetite is good. No nausea, vomiting, constipation or diarrhea. No dysuria, frequency, burning or pain with urination. Otherwise review of systems are negative.         Physical Exam   PHYSICAL EXAMINATION:  Vital signs:  /79, heart rate 84 respirations 20, temperature 98.4, 96% on room air.  Current weight 154 pounds.  General: Awake, Alert, oriented x3, appropriately, follows simple commands, conversant  HEENT:PERRLA, Pink conjunctiva, anicteric sclerae, moist oral mucosa.  Slight left-sided facial droop.  Tongue is midline.    NECK: Supple, with  No lymphadenopathy  CVS:  S1  S2, without murmur or gallop.  Regular rate and rhythm on exam.  LUNG: No wheezes rales or rhonchi.  Cough much improved.  No longer wet sounding.  Off his oxygen on room air.  BACK: No kyphosis of the thoracic spine  ABDOMEN: Soft, nontender to palpation, with positive bowel sounds  EXTREMITIES:   No LE edema.  He is able to move his left lower extremity kicked his foot out.  Move up and down.  Increased movement in the left hand and fingers.  Grasp present.  Some difficulty with overhead raise.   SKIN: Warm and dry.  Scabbed areas to right lower extremity.  Some bruising surrounding.  NEUROLOGIC:  Intact, pulses palpable  PSYCHIATRIC: Cognition intact.  Pseudobulbar affect with occasional crying.        Assessment/Plan:  1. CVA (cerebral vascular accident)     2. Left hemiparesis     3. COPD exacerbation     4. COPD (chronic obstructive pulmonary disease)     5. Diabetes mellitus     6. Coronary artery disease     7. Pseudobulbar affect         Patient status post CVA with left-sided hemiparesis.  He is moving his lower extremity better. Patient with underlying COPD now and acute COPD exacerbation.   Continues on a burst of prednisone with taper.  Currently at 20 mg daily which will go to 15 mg a day ×5 days then 10 mg ×5 days then 5 mg for maintenance dose.  He also continues on Levaquin for 4 more days.  The cough was wet today.  He is off his oxygen.  Lung sounds clear.  He refuses nebulizers.  Continues on to inhalers.  He does continue on Mucinex.  He will need a follow-up pulmonology post discharge.  Diabetes.  Occasional blood sugars in the 200s.  He has been on sliding scale along with his oral anti-hyperglycemics.  He tells me he will not take insulin at home.  Will discontinue this.  Coronary artery disease.  No chest pain.  Blood pressures are satisfactory.  Pseudobulbar affect.  Currently on no treatment.    Patient will discharge home with current medications and treatments.  Home PT OT home health aide and RN.  Patient should follow-up with pulmonology as well as primary care provider.      Electronically signed by: Sarah Smith CNP

## 2021-06-18 NOTE — LETTER
Letter by Sarah Smith CNP at      Author: Sarah Smith CNP Service: -- Author Type: --    Filed:  Encounter Date: 1/7/2019 Status: (Other)         Patient: Tariq Pinzon   MR Number: [id number]   YOB: 1950   Date of Visit: 1/7/2019     Code Status:  POLST AVAILABLE  Visit Type: Problem Visit     Facility:  [facility] [[id number]]         Facility Type: SNF (Skilled Nursing Facility, TCU)     Date of Service 5/10/18    History of Present Illness: Tariq Pinzon is a 68 y.o. male whom I am seeing today for follow up of recent COPD exacerbation. Past medical history includes CVA with left-sided hemiparesis, hypertension, dysphagia, diabetes mellitus type 2 and COPD. Today pt off oxygen. He tells me he continues to use it at night. He is a-febrile. He continues on Doxycycline and Prednisone taper. He continues with occasional coarse cough. Lungs with occasional expiratory wheeze. Much improved from last visit.       Active Ambulatory Problems     Diagnosis Date Noted   ? CVA (cerebral vascular accident) (H) 03/19/2018   ? Left hemiparesis (H) 03/19/2018   ? Hypertension 03/19/2018   ? Dysarthria 03/19/2018   ? Dysphagia 03/19/2018   ? Diabetes mellitus (H) 03/22/2018   ? COPD (chronic obstructive pulmonary disease) (H) 04/16/2018     Resolved Ambulatory Problems     Diagnosis Date Noted   ? No Resolved Ambulatory Problems     Past Medical History:   Diagnosis Date   ? Abnormality of gait    ? Acute bronchospasm    ? Adjustment disorder with anxiety    ? Alcohol use disorder, mild, abuse    ? Asthma    ? CAD (coronary artery disease)    ? COPD (chronic obstructive pulmonary disease) (H)    ? CVA (cerebral vascular accident) (H)    ? DM2 (diabetes mellitus, type 2) (H)    ? Dysarthria    ? Dysphagia    ? HLD (hyperlipidemia)    ? HTN (hypertension)    ? Hyperglycemia    ? Hyperlipidemia    ? Hypertension    ? Left hemiparesis (H)    ? Long-term use of aspirin  therapy    ? Myocardial infarction (H)    ? Pulmonary nodules    ? Tobacco abuse        Meds reviewed at the facility.     No Known Allergies      Review of Systems   No fevers or chills. No headache, lightheadedness or dizziness. Pt reports shortness of breath improving. He continues with occasional cough. He is only wearing oxygen at night now.   No chest pains or palpitations. Appetite is good. No nausea, vomiting, constipation or diarrhea. No dysuria, frequency, burning or pain with urination. Otherwise review of systems are negative.       Physical Exam   PHYSICAL EXAMINATION  Vital signs: BP 99/60, respirations 16, heart rate 88, temperature 98.9,  O2 sat 96% on room air.  Current weight 138.2 pounds.  General: Awake, Alert, oriented x3, appropriately, follows simple commands, conversant. Sitting up in wheelchair.   HEENT: Slight left-sided facial droop.  Tongue is midline.   NECK: Supple, with  No lymphadenopathy  CARDIOVASCULAR: S1-S2 without murmur gallop.  RESPIRATORY: Off oxygen. Lungs with occasional expiratory wheezing. Occasional coarse cough noted.    EXTREMITIES:   Left side hemiparesis. Contracture to left wrist. He is able to tolerate PROM with wrist and hand.   SKIN: Warm and dry.   NEUROLOGIC: CVA with left-sided hemiparesis, pulses palpable  PSYCHIATRIC: Cognition intact.  Pleasant affect.        Assessment/Plan:  1. Chronic obstructive pulmonary disease with acute lower respiratory infection (H)       Pt with underlying COPD. He has had increased cough and congestion over the last week. CXR ordered. CXR showed no pulmonary vascular congestion. Peribronchial thickening in the mid and lower lung fields bilaterally consistent with bronchitis.He continues on Doxycyline and prednisone taper. Cough improving. He continues on nebs. He is only using oxygen at night now.       Electronically signed by: Sarah Smith, CNP

## 2021-06-18 NOTE — LETTER
Letter by Sarah Smith CNP at      Author: Sarah Smith CNP Service: -- Author Type: --    Filed:  Encounter Date: 1/14/2019 Status: (Other)         Patient: Tariq Pinzon   MR Number: [id number]   YOB: 1950   Date of Visit: 1/14/2019     Code Status:  Full Code  Visit Type: Problem Visit     Facility:  [facility] [[id number]]         Facility Type: Long Term Care    Date of Service 5/10/18    History of Present Illness: Tariq Pinzon is a 68 y.o. male whom I am seeing today at the patient's request.  Patient recently seen and treated for acute on chronic COPD with exacerbation.  Patient completed his oral antibiotics as well as a burst of prednisone.  He initially was improving however over the last 2 days increased cough and production of sputum.  He has been afebrile. Today he is wearing oxygen at 2L. He is very shortness of breath with talking.  He continues with a very wet coarse nonproductive cough.  He reports having difficulty expressing mucus.  He continues on nebulizers 4 times daily.      Active Ambulatory Problems     Diagnosis Date Noted   ? CVA (cerebral vascular accident) (H) 03/19/2018   ? Left hemiparesis (H) 03/19/2018   ? Hypertension 03/19/2018   ? Dysarthria 03/19/2018   ? Dysphagia 03/19/2018   ? Diabetes mellitus (H) 03/22/2018   ? COPD (chronic obstructive pulmonary disease) (H) 04/16/2018     Resolved Ambulatory Problems     Diagnosis Date Noted   ? No Resolved Ambulatory Problems     Past Medical History:   Diagnosis Date   ? Abnormality of gait    ? Acute bronchospasm    ? Adjustment disorder with anxiety    ? Alcohol use disorder, mild, abuse    ? Asthma    ? CAD (coronary artery disease)    ? COPD (chronic obstructive pulmonary disease) (H)    ? CVA (cerebral vascular accident) (H)    ? DM2 (diabetes mellitus, type 2) (H)    ? Dysarthria    ? Dysphagia    ? HLD (hyperlipidemia)    ? HTN (hypertension)    ? Hyperglycemia    ?  Hyperlipidemia    ? Hypertension    ? Left hemiparesis (H)    ? Long-term use of aspirin therapy    ? Myocardial infarction (H)    ? Pulmonary nodules    ? Tobacco abuse        Meds reviewed at the facility.     No Known Allergies      Review of Systems   No fevers or chills. No headache, lightheadedness or dizziness.  Patient again presenting with shortness of breath with exertion.  He is wearing oxygen again.  Continues with cough.  He tells me it kept him up last night.  He is having difficulty expelling mucus.  He feels the nebulizers are helpful. No chest pains or palpitations. Appetite is good.  He is drinking fluids.  No nausea, vomiting, constipation or diarrhea. No dysuria, frequency, burning or pain with urination. Otherwise review of systems are negative.       Physical Exam   PHYSICAL EXAMINATION  Vital signs: /66, heart rate 75, respirations 20, O2 sat 96% on 2 L. current weight 138.2 pounds.  General: Awake, Alert, oriented x3, appropriately, follows simple commands, conversant. Sitting up in wheelchair.   HEENT: Slight left-sided facial droop.  Tongue is midline.   NECK: Supple, with  No lymphadenopathy  CARDIOVASCULAR: S1-S2 without murmur gallop.  RESPIRATORY: Patient wearing oxygen again and today at 2 L nasal cannula.  Some dyspnea with talking.  Coarse wet nonproductive cough.  Tight coarse wheezing noted.   EXTREMITIES:   Left side hemiparesis.   SKIN: Warm and dry.   NEUROLOGIC: CVA with left-sided hemiparesis, pulses palpable  PSYCHIATRIC: Cognition intact.  Pleasant affect.        Assessment/Plan:  1. Chronic obstructive pulmonary disease with acute lower respiratory infection (H)       Acute on chronic COPD with recurrent exacerbation.  He was treated about 1 week ago with antibiotics and prednisone.  He did improve however the last 2 days presented with increased shortness of breath and coughing.  He is back on oxygen at 2 L nasal cannula.  He is currently afebrile.  I will resume his  doxycycline twice daily for 7 days.  I will also resume his prednisone.  We will treat with a prednisone burst and taper starting with 30 mg daily times 5 days then reduce by 5 mg increments.  He will need monitoring of his blood sugars so I will increase this to 4 times daily given the increased prednisone.  He may need sliding scale.  Will have staff call me if blood sugars are greater than 150.  Continues on nebulizers 4 times daily.         Electronically signed by: Sarah Smith CNP

## 2021-06-18 NOTE — PROGRESS NOTES
Code Status:  POLST AVAILABLE  Visit Type: Problem Visit     Facility:  [facility] [[id number]]         Facility Type: SNF (Skilled Nursing Facility, TCU)     Date of Service 5/10/18    History of Present Illness: Tariq Pinzon is a 68 y.o. male whom I am seeing today for follow-up on the TCU.  Patient recently hospitalized on 2/15/2018.  He presented to the hospital with left-sided weakness and CT scan showed a subacute/chronic cortical infarct in the right coronary radiata.  CT angiogram was done and showed intracranial atherosclerotic sclerosis and narrowing but no large vessel occlusion.  An MRI showed right pontine infarct as well as small left temporal infarct and right coronary radiata coronary infarct.  He has a history of cerebrovascular disease and had not been taking his medications at home.  He has underlying diabetes.  Poor management.  Underlying COPD hypertension and dyslipidemia.  He is oxygen dependent at home however  he tells me he only wears this at night.  He has left dense hemiparesis.  Some dysphagia as well as aphasia.  He continues in therapy including speech therapy.  No movement on the left.  History of alcohol abuse as well as adjustment disorder with anxiety.     Today pt without his oxygen. No dyspnea at rest. He reports some improvement in his cough. He also reports improvement in his SOB. Lungs CTA. He continues on taper of prednisone. His blood sugars are elevated. He continues only on oral antihyperglycemics.       Active Ambulatory Problems     Diagnosis Date Noted     CVA (cerebral vascular accident) 03/19/2018     Left hemiparesis 03/19/2018     Hypertension 03/19/2018     Dysarthria 03/19/2018     Dysphagia 03/19/2018     Diabetes mellitus 03/22/2018     COPD (chronic obstructive pulmonary disease) 04/16/2018     Resolved Ambulatory Problems     Diagnosis Date Noted     No Resolved Ambulatory Problems     Past Medical History:   Diagnosis Date     Abnormality  of gait      Acute bronchospasm      Adjustment disorder with anxiety      Alcohol use disorder, mild, abuse      CAD (coronary artery disease)      COPD (chronic obstructive pulmonary disease)      CVA (cerebral vascular accident)      DM2 (diabetes mellitus, type 2)      Dysarthria      Dysphagia      HLD (hyperlipidemia)      HTN (hypertension)      Hyperglycemia      Left hemiparesis        Meds reviewed at the facility.     No Known Allergies      Review of Systems   No fevers or chills. No headache, lightheadedness or dizziness. Pt with decreased SOB. Today he is off his oxygen. Continues with occasional wet non productive cough. No chest pains or palpitations. Appetite is good.Denies any dysphagia.  No nausea, vomiting, constipation or diarrhea.  No frequency, burning or pain.     Physical Exam   PHYSICAL EXAMINATION:  Vital signs:  /68  Pulse 77  Temp 98.2  F (36.8  C)  Resp 16  Wt 153 lb 3.2 oz (69.5 kg)  SpO2 94%    General: Awake, Alert, oriented x3, appropriately, follows simple commands, conversant  HEENT:PERRLA, Pink conjunctiva, anicteric sclerae, moist oral mucosa.  Slight left-sided facial droop.  Tongue is midline.    NECK: Supple, with  No lymphadenopathy  CVS:  S1  S2, without murmur or gallop.  Regular rate and rhythm on exam.  LUNG:Off his oxygen today. No dyspnea at rest. Occasional wet non productive cough.   BACK: No kyphosis of the thoracic spine  ABDOMEN: Soft, nontender to palpation, with positive bowel sounds  : No irritation at penial tip. No blood noted in pad.   EXTREMITIES:   No LE edema.  He is able to move his left lower extremity kicked his foot out.  Move up and down.  Increased movement in the left hand and fingers.  Grasp present.  Some difficulty with overhead raise.   SKIN: Warm and dry.  Scabbed areas to right lower extremity.  Some bruising surrounding.  NEUROLOGIC: Intact, pulses palpable  PSYCHIATRIC: Cognition intact.         Assessment/Plan:  1. CVA  (cerebral vascular accident)     2. Left hemiparesis     3. COPD (chronic obstructive pulmonary disease)     4. Diabetes mellitus       Pt with recent increased SOB and continued wet non productive cough. He has underlying COPD. I recently started him on Prednisone burst. He continues on taper. Today he is off his oxygen. His cough is somewhat improved. He denies any SOB today. He continues on Mucin ex. He refuses to use nebs. He has underlying DM and with recent increase in prednisone his blood sugars are consistently in the 200s-300s in the late evening. He continues on Glipizide and Januvia.  I will start him on Novolog sensitive sliding scale with pm meal. CVA with left sided hemiparesis. He is increasing strength on the left side.     Upon discharge pt with need a hospital bed for positioning post CVA with left sided hemiparesis, COPD with chronic use of oxygen and SOB when lying flat. Pt will need a electrical bed due to CVA with left sided hemiparesis and in ability to operate manual bed. Pt will need this for 99 months. Pt will also need a standard manual wheelchair.   Pt will need a standard wheelchair upon discharge. He has mobility limitations impairing his ability to participate in ADLS such toileting, feeding, dressing, grooming and bathing in customary locations in the home without wheelchair.CVA with left sided hemiparesis.  Patient/cargiver are able to safely use a manual wheelchair. Pt's functional mobility deficit can be sufficiently resolved by the use of a manual wheel chair. Patients mobility can't be safely resolved by use of a cane, walker or crutches. Pt high risk for falls with impulstivity, poor insight to deficits and SOB with exertion.         Electronically signed by: Sarah Smith, CNP

## 2021-06-18 NOTE — PROGRESS NOTES
Code Status:  POLST AVAILABLE  Visit Type: Problem Visit     Facility:  [facility] [[id number]]         Facility Type: SNF (Skilled Nursing Facility, TCU)     Date of Service 5/10/18    History of Present Illness: Tariq Pinzon is a 68 y.o. male whom I am seeing today for refusal of diabetic medication.  Patient transferred from TCU to long-term care recently.  Patiently recently readmitted to the TCU secondary to inability to care for himself at home.  He was discharged a couple weeks ago home with his daughter.  She is 8 months pregnant and is unable to lift patient or provide increased cares.  Patient seen by his primary care provider.  Clinically stable at the time.  It is questionable whether or not he is a candidate for long-term care.  He did readmit to the TCU on O2 at 2L NC. Patient recently hospitalized on 2/15/2018.  He presented to the hospital with left-sided weakness and CT scan showed a subacute/chronic cortical infarct in the right coronary radiata.  CT angiogram was done and showed intracranial atherosclerotic sclerosis and narrowing but no large vessel occlusion.  An MRI showed right pontine infarct as well as small left temporal infarct and right coronary radiata coronary infarct.  He has a history of cerebrovascular disease and had not been taking his medications at home.  He has underlying diabetes.  Poor management.  Underlying COPD hypertension and dyslipidemia.  He is oxygen dependent at home however  he tells me he only wears this at night.  He has left dense hemiparesis.  Some dysphagia as well as aphasia.  He continues in therapy including speech therapy.  No movement on the left.  History of alcohol abuse as well as adjustment disorder with anxiety.     Today pt sitting up in wheelchair.  Transfer from TCU to long-term care.  Nursing staff report patient is refusing his metformin.  His has underlying diabetes.  He was previously on Januvia.  He was refusing to take this  secondary to loose stools.  This was discontinued.  He was started on Metformin.  Reports loose stools.  Nursing staff deny.  Blood sugars occasionally in the high 200s.  He reports being noncompliant with his diet.  We did discuss long-acting insulin on today's visit.  He currently is refusing.  He tells me he will cut out all sugar.  Reviewed risks factors such as stroke.    Active Ambulatory Problems     Diagnosis Date Noted     CVA (cerebral vascular accident) (H) 03/19/2018     Left hemiparesis (H) 03/19/2018     Hypertension 03/19/2018     Dysarthria 03/19/2018     Dysphagia 03/19/2018     Diabetes mellitus (H) 03/22/2018     COPD (chronic obstructive pulmonary disease) (H) 04/16/2018     Resolved Ambulatory Problems     Diagnosis Date Noted     No Resolved Ambulatory Problems     Past Medical History:   Diagnosis Date     Abnormality of gait      Acute bronchospasm      Adjustment disorder with anxiety      Alcohol use disorder, mild, abuse      Asthma      CAD (coronary artery disease)      COPD (chronic obstructive pulmonary disease) (H)      CVA (cerebral vascular accident) (H)      DM2 (diabetes mellitus, type 2) (H)      Dysarthria      Dysphagia      HLD (hyperlipidemia)      HTN (hypertension)      Hyperglycemia      Hyperlipidemia      Hypertension      Left hemiparesis (H)      Long-term use of aspirin therapy      Myocardial infarction      Pulmonary nodules      Tobacco abuse        Meds reviewed at the facility.     No Known Allergies      Review of Systems   No fevers or chills. No headache, lightheadedness or dizziness. No SOB, chest pains or palpitations. He continues with occasional coarse cough. Appetite is good. No nausea, vomiting, constipation. No dysuria, frequency, burning or pain with urination.         Physical Exam   PHYSICAL EXAMINATION:  Vital signs: Reviewed in the record.  General: Awake, Alert, oriented x3, appropriately, follows simple commands, conversant  HEENT:PERRLA, Pink  conjunctiva, anicteric sclerae, moist oral mucosa.  Slight left-sided facial droop.  Tongue is midline.    NECK: Supple, with  No lymphadenopathy  EXTREMITIES:   No LE edema.  He is able to move his left lower extremity and kick his foot out.  Move up and down.  Increased movement in the left hand and fingers.  Grasp present.  Some difficulty with overhead raise.  SKIN: Warm and dry.    NEUROLOGIC: Intact, pulses palpable  PSYCHIATRIC: Cognition intact.  Pseudobulbar affect with occasional crying.        Assessment/Plan:  1. CVA (cerebral vascular accident) (H)     2. Diabetes mellitus (H)     3. Left hemiparesis (H)         Patient with underlying diabetes type 2.  He is continued on glipizide was previously on Januvia.  Patient was started on low-dose dose of this was discontinued.  Was placed on metformin but has been refusing this for the last 3 days.  Patient reporting loose stools however staff denies.  Blood sugars in the upper 200s occasionally.  He reports noncompliance with his diet.  I did speak with him about long-acting insulin.  Refusing currently.  Tells me he will be more compliant with his diet.  Will monitor the next 4 days and reevaluate.  I did review risk factors of elevated blood sugars as well as stroke risk.          Electronically signed by: Sarah Smith CNP

## 2021-06-19 NOTE — PROGRESS NOTES
Code Status:  POLST AVAILABLE  Visit Type: Problem Visit     Facility:  [facility] [[id number]]         Facility Type: SNF (Skilled Nursing Facility, TCU)     Date of Service 5/10/18    History of Present Illness: Tariq Pinzon is a 68 y.o. male whom I am seeing today for follow up of DM and COPD symptoms.   Past medical history includes CVA with left-sided hemiparesis, hypertension, dysphagia, diabetes mellitus type 2 and COPD. Pt is being weaned from O2. He is down to 1L NC. He denies any shortness of breath. He had no wheezing on exam today. He continues on prednisone 5 mg daily. He did not tolerate wean during his TCU stay. He is requesting today to stop this. I did recently decrease to 5 mg from 10 mg. He continues on Abreva and albuterol inhaler.  He has underlying DM. He continues on glipizide and Lantus. He often refuses his insulin. Today review of  blood sugars show that his blood sugars are improving. In the early am blood sugars . Mid am , afternoon blood sugars 179-271 and -276. He was having loose stools over the weekend however this has resolved. Pt currently able to ambulate with his daughter and PT.     Active Ambulatory Problems     Diagnosis Date Noted     CVA (cerebral vascular accident) (H) 03/19/2018     Left hemiparesis (H) 03/19/2018     Hypertension 03/19/2018     Dysarthria 03/19/2018     Dysphagia 03/19/2018     Diabetes mellitus (H) 03/22/2018     COPD (chronic obstructive pulmonary disease) (H) 04/16/2018     Resolved Ambulatory Problems     Diagnosis Date Noted     No Resolved Ambulatory Problems     Past Medical History:   Diagnosis Date     Abnormality of gait      Acute bronchospasm      Adjustment disorder with anxiety      Alcohol use disorder, mild, abuse      Asthma      CAD (coronary artery disease)      COPD (chronic obstructive pulmonary disease) (H)      CVA (cerebral vascular accident) (H)      DM2 (diabetes mellitus, type 2) (H)       Dysarthria      Dysphagia      HLD (hyperlipidemia)      HTN (hypertension)      Hyperglycemia      Hyperlipidemia      Hypertension      Left hemiparesis (H)      Long-term use of aspirin therapy      Myocardial infarction      Pulmonary nodules      Tobacco abuse        Meds reviewed at the facility.     No Known Allergies      Review of Systems   Refer to HPI otherwise negative.    Physical Exam   PHYSICAL EXAMINATION:  Vital signs: /62, P 78, R 16, O2 sat 99%. current weight 147.5 pounds.  General: Awake, Alert, oriented x3, appropriately, follows simple commands, conversant  HEENT:PERRLA, Pink conjunctiva, anicteric sclerae, moist oral mucosa.  Slight left-sided facial droop.  Tongue is midline.    NECK: Supple, with  No lymphadenopathy  CARDIOVASCULAR: S1-S2 without murmur gallop.  RESPIRATORY: No wheezes, rales and rhonchi. No cough on exam. No dyspnea at rest. Pt on O2 at 1L NC.    EXTREMITIES:   Left side hemiparesis.   SKIN: Warm and dry.  Multiple scabbed areas to his extremities.Purpura from long-standing steroid use.  NEUROLOGIC: Intact, pulses palpable  PSYCHIATRIC: Cognition intact. Pleasant affect.         Assessment/Plan:  1. COPD (chronic obstructive pulmonary disease) (H)     2. Diabetes mellitus (H)     3. CVA (cerebral vascular accident) (H)     4. Left hemiparesis (H)       COPD. Continue to attempt to wean from O2. Currently on 1L NC.He continues on Prednisone 5 mg daily. I recently reduced from 10 mg to 5 mg. I will further reduce to 2.5 mg.  DM type II. He continues on Lantus 10 Q HS and glipizide 10mg two times a day. Blood sugars improving. Will continue to make adjustments. CVA with left sided hemiparesis. He is requesting to ambulate with staff. I will have PT eval and treat.         Electronically signed by: Sarah Smith, CNP

## 2021-06-19 NOTE — LETTER
Letter by Sarah Smith CNP at      Author: Sarah Smith CNP Service: -- Author Type: --    Filed:  Encounter Date: 6/12/2019 Status: (Other)         Patient: Tariq Pinzon   MR Number: [id number]   YOB: 1950   Date of Visit: 6/12/2019     Code Status:  Full Code  Visit Type: Discharge Summary     Facility:  [facility] [[id number]]         Facility Type: Long Term Care      History of Present Illness: Tariq Pinzon is a 69 y.o. male whom I am seeing today for discharge to another LTC facility down in [facility]. Past medical history includes COPD, CVA with left-sided paresis, hypertension, dysphagia, diabetes type 2, CAD and hypertension. Pt with left sided weakness. He is able to ambulate with hosea walker. COPD dependent on oxygen at night.  He continues on Advair and Duo Nebs. Pt hospitalized in March for pulmonary edema.There were some suspicious nodule versus mass found on CT.  He  has refused follow-up pulmonary consult as well as chest CT. Pt with mx abrasions to LE with recent redness surrounding. He picks at his skin. Pt treated with Bactroban topically. He has had a history of pseudo-bulbar affect disorder post stroke however today he says he has a long-standing history of posttraumatic stress disorder.  This was back in the early 2000's.  He was seen by psychiatry.  He said he did take an antidepressant for about 5 months and then took himself off of that.  This was sometime around 2008.  He denies the need for medication.  His PHQ 9 in March was 4 out of 27. BIMS score was 15. DM well controlled with glipizide. He has been weaned off his insulin.     Active Ambulatory Problems     Diagnosis Date Noted   ? CVA (cerebral vascular accident) (H) 03/19/2018   ? Left hemiparesis (H) 03/19/2018   ? Hypertension 03/19/2018   ? Dysarthria 03/19/2018   ? Dysphagia 03/19/2018   ? Diabetes mellitus (H) 03/22/2018   ? COPD (chronic obstructive pulmonary disease)  (H) 04/16/2018     Resolved Ambulatory Problems     Diagnosis Date Noted   ? No Resolved Ambulatory Problems     Past Medical History:   Diagnosis Date   ? Abnormality of gait    ? Acute bronchospasm    ? Adjustment disorder with anxiety    ? Alcohol use disorder, mild, abuse    ? Asthma    ? CAD (coronary artery disease)    ? COPD (chronic obstructive pulmonary disease) (H)    ? CVA (cerebral vascular accident) (H)    ? DM2 (diabetes mellitus, type 2) (H)    ? Dysarthria    ? Dysphagia    ? HLD (hyperlipidemia)    ? HTN (hypertension)    ? Hyperglycemia    ? Hyperlipidemia    ? Hypertension    ? Left hemiparesis (H)    ? Long-term use of aspirin therapy    ? Myocardial infarction (H)    ? Pulmonary nodules    ? Tobacco abuse        Meds reviewed at the facility.     No Known Allergies      Review of Systems   No fevers or chills. No headache, lightheadedness or dizziness.  Denies shortness of breath or chest pain.  Only using oxygen at night.  Reports only a dry cough.  Appetite is good. No nausea, vomiting, constipation or diarrhea. No dysuria, frequency, burning or pain with urination. He would like to stop taking all meds.  He often picks at lesions. Increased feelings of loneliness. Hx of PTSD. He denies the need for meds.       Physical Exam   PHYSICAL EXAMINATION  Vital signs: /73, heart rate 66 and respirations 18, temperature 97, O2 sat 95% on room air. Current weight 138.8 pounds.  General: Awake, Alert, oriented x3, appropriately, follows simple commands, conversant. Sitting up in wheelchair.   HEENT: Slight left-sided facial droop.  Tongue is midline.   NECK: Supple, with  No lymphadenopathy  CARDIOVASCULAR: S1-S2 without murmur gallop.  RESPIRATORY:   No wheezes rales or rhonchi.  Dry cough.  ABDOMEN: Soft non-distended with positive bowel sounds.   EXTREMITIES:   Left side hemiparesis.  No lower extremity edema.  Moves all extremities.  SKIN: Multiple skin lesions to lower extremities  healing.  NEUROLOGIC: CVA with left-sided hemiparesis, pulses palpable  PSYCHIATRIC: Cognition intact.  Happy today on exam. Making jokes.     Labs:  Lab Results   Component Value Date    WBC 11.9 (H) 05/21/2018    HGB 12.5 (L) 05/21/2018    HCT 37.1 (L) 05/21/2018    MCV 91 05/21/2018     05/21/2018     Results for orders placed or performed in visit on 05/09/18   Basic Metabolic Panel   Result Value Ref Range    Sodium 141 136 - 145 mmol/L    Potassium 4.0 3.5 - 5.0 mmol/L    Chloride 104 98 - 107 mmol/L    CO2 29 22 - 31 mmol/L    Anion Gap, Calculation 8 5 - 18 mmol/L    Glucose 99 70 - 125 mg/dL    Calcium 9.3 8.5 - 10.5 mg/dL    BUN 18 8 - 22 mg/dL    Creatinine 0.84 0.70 - 1.30 mg/dL    GFR MDRD Af Amer >60 >60 mL/min/1.73m2    GFR MDRD Non Af Amer >60 >60 mL/min/1.73m2             Assessment/Plan:  1. Cerebrovascular accident (CVA), unspecified mechanism (H)   -continues on ASA, Statin and Metoprolol.       2. Chronic obstructive pulmonary disease with acute lower respiratory infection (H)   -On Adv  air and and Duo Nebs.   -Oxygen dependent over night.       3. Type 2 diabetes mellitus with complication, without long-term current use of insulin (H)   -satisfactory controlled on glipizide. Weaned off insulin.       4. 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 Pseudobulbar affect   -Denies need for med. Hx of PTSD.       6. Left hemiparesis (H)   -Ambulates with hosea walker.       7. Essential hypertension   -satisfactory on Metoprolol.      8. Skin lesion of  leg   -Bactroban ointment.       Pt to d/c to LTC with current meds and treatments including nebs and oxygen. PT, OT, ST to eval and tx. Follow up with PCP in 1 week.     Electronically signed by: Sarah Smith, JORGE LUIS

## 2021-06-19 NOTE — LETTER
Letter by Elver Murphy DO at      Author: Elver Murphy DO Service: -- Author Type: --    Filed:  Encounter Date: 3/28/2019 Status: (Other)         Patient: Tariq Pinzon   MR Number: [id number]   YOB: 1950   Date of Visit: 3/28/2019     [facility]    Facility:   [facility] [[id number]]   Code Status: FULL CODE      CHIEF COMPLAINT/REASON FOR VISIT:  Chief Complaint   Patient presents with   ? Review Of Multiple Medical Conditions       HISTORY:      HPI: Tariq is a 69 y.o. male Z is a had a CVA with left-sided hemiparesis as well as dysphasia.  And he has had hypoxemia.  He is currently weaned him off of the oxygen and he is doing well off the oxygen satting appropriately 94% on room air with a respiratory rate of 17.  He was recently sent to the emergency room found to have a pleural effusion with some mild leukocytosis a white count is 11.3.  He was placed on oral antibiotics and he continues on Keflex.  He did complete his antibiotics at this time he did discontinue nebulizers now he is off the oxygen.  He does need to follow-up with pulmonology and have a chest CT secondary to secondary nodules that were found but he is somewhat hesitant to do this.  He has no new concerns at this time and nursing staff have no new concerns.    Past Medical History:   Diagnosis Date   ? Abnormality of gait    ? Acute bronchospasm    ? Adjustment disorder with anxiety    ? Alcohol use disorder, mild, abuse    ? Asthma    ? CAD (coronary artery disease)    ? COPD (chronic obstructive pulmonary disease) (H)     with emphysema   ? CVA (cerebral vascular accident) (H)    ? DM2 (diabetes mellitus, type 2) (H)    ? Dysarthria    ? Dysphagia    ? HLD (hyperlipidemia)    ? HTN (hypertension)    ? Hyperglycemia    ? Hyperlipidemia    ? Hypertension    ? Left hemiparesis (H)    ? Long-term use of aspirin therapy    ? Myocardial infarction (H)     NSTEMI   ? Pulmonary nodules      left   ? Tobacco abuse              Family History   Problem Relation Age of Onset   ? Diabetes Mother    ? Asthma Mother    ? Lung cancer Father    ? Asthma Maternal Grandmother      Social History     Socioeconomic History   ? Marital status:      Spouse name: None   ? Number of children: None   ? Years of education: None   ? Highest education level: None   Occupational History   ? None   Social Needs   ? Financial resource strain: None   ? Food insecurity:     Worry: None     Inability: None   ? Transportation needs:     Medical: None     Non-medical: None   Tobacco Use   ? Smoking status: Former Smoker     Packs/day: 2.00     Years: 38.00     Pack years: 76.00   ? Smokeless tobacco: Former User   Substance and Sexual Activity   ? Alcohol use: No   ? Drug use: None   ? Sexual activity: None   Lifestyle   ? Physical activity:     Days per week: None     Minutes per session: None   ? Stress: None   Relationships   ? Social connections:     Talks on phone: None     Gets together: None     Attends Oriental orthodox service: None     Active member of club or organization: None     Attends meetings of clubs or organizations: None     Relationship status: None   ? Intimate partner violence:     Fear of current or ex partner: None     Emotionally abused: None     Physically abused: None     Forced sexual activity: None   Other Topics Concern   ? None   Social History Narrative   ? None         Review of Systems   Constitutional:        Patient denies any pain fevers chills nausea vomiting diarrhea change in vision hearing taste  or smell he does have weakness on his left side which has not changed and he has no chest pain or shortness of breath.  He urinated without difficulty without any urgency frequency burning with urination and is moving his bowels without difficulty.  The remainder the review of systems is negative.       .  Vitals:    03/28/19 2000   BP: 129/69   Pulse: 62   Resp: 17   Temp: 98.7  F (37.1  C)    SpO2: 94%       Physical Exam   Constitutional: No distress.   HENT:   Head: Normocephalic and atraumatic.   Eyes: Conjunctivae are normal. Right eye exhibits no discharge. Left eye exhibits no discharge.   Neck: Neck supple. No thyromegaly present.   Cardiovascular: Normal rate and regular rhythm.   No murmur heard.  Pulmonary/Chest: Effort normal and breath sounds normal. No respiratory distress. He has no wheezes. He has no rales.   Abdominal: Soft. Bowel sounds are normal. He exhibits no distension. There is no tenderness.   Musculoskeletal: He exhibits no edema or tenderness.   Lymphadenopathy:     He has no cervical adenopathy.   Neurological:   Left-sided hemiparesis is noted has not changed since last exam.   Skin: Skin is warm and dry. He is not diaphoretic.   Psychiatric: He has a normal mood and affect. His behavior is normal.         LABS:       ASSESSMENT:      ICD-10-CM    1. Chronic obstructive pulmonary disease with acute lower respiratory infection (H) J44.0    2. Type 2 diabetes mellitus with complication, without long-term current use of insulin (H) E11.8    3. 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 Left hemiparesis (H) G81.94    5. Cerebrovascular accident (CVA), unspecified mechanism (H) I63.9        PLAN: Plan at this time will continue to monitor above medical problems and no new changes.  Care plan was reviewed and is appropriate.  I was unable to get his blood sugars up at this time on the matrix.  We will check these later.      Total  minutes of which % was spent counseling and coordination of care of the above plan.    Electronically signed by: Elver Murphy DO

## 2021-06-19 NOTE — LETTER
Letter by Sarah Smith CNP at      Author: Sarah Smith CNP Service: -- Author Type: --    Filed:  Encounter Date: 4/22/2019 Status: (Other)         Patient: Tariq Pinzon   MR Number: [id number]   YOB: 1950   Date of Visit: 4/22/2019     Code Status:  Full Code  Visit Type: Problem Visit     Facility:  [facility] [[id number]]         Facility Type: Long Term Care      History of Present Illness: Tariq Pinzon is a 69 y.o. male whom I am seeing today for sores to his LE.   Past medical history includes COPD, CVA with left-sided paresis, hypertension, dysphagia, diabetes type 2, CAD and hypertension.  Patient with recent episode in which she became very weak with slurred speech.  He does have a history of CVA. Pt with mx abrasions to LE with redness surrounding. He picks at his skin. He initially denies any itching but then points to a bottle of gold bond. He also has an area of scratching to his wrist. He is a-febrile. I have treated him with oral antibiotics in the past due to like lesions. He often refuses bactroban to be placed on lesions.           Active Ambulatory Problems     Diagnosis Date Noted   ? CVA (cerebral vascular accident) (H) 03/19/2018   ? Left hemiparesis (H) 03/19/2018   ? Hypertension 03/19/2018   ? Dysarthria 03/19/2018   ? Dysphagia 03/19/2018   ? Diabetes mellitus (H) 03/22/2018   ? COPD (chronic obstructive pulmonary disease) (H) 04/16/2018     Resolved Ambulatory Problems     Diagnosis Date Noted   ? No Resolved Ambulatory Problems     Past Medical History:   Diagnosis Date   ? Abnormality of gait    ? Acute bronchospasm    ? Adjustment disorder with anxiety    ? Alcohol use disorder, mild, abuse    ? Asthma    ? CAD (coronary artery disease)    ? COPD (chronic obstructive pulmonary disease) (H)    ? CVA (cerebral vascular accident) (H)    ? DM2 (diabetes mellitus, type 2) (H)    ? Dysarthria    ? Dysphagia    ? HLD (hyperlipidemia)     ? HTN (hypertension)    ? Hyperglycemia    ? Hyperlipidemia    ? Hypertension    ? Left hemiparesis (H)    ? Long-term use of aspirin therapy    ? Myocardial infarction (H)    ? Pulmonary nodules    ? Tobacco abuse        Meds reviewed at the facility.     No Known Allergies      Review of Systems   No fevers or chills. No headache, lightheadedness or dizziness.  Denies shortness of breath or chest pain.  Only using oxygen at night.  Reports only a dry cough.  Appetite is good. No nausea, vomiting, constipation or diarrhea. No dysuria, frequency, burning or pain with urination. Reports itching denies need for additional treatment other than gold bond. He often picks at lesions.       Physical Exam   PHYSICAL EXAMINATION  Vital signs: /67, P 75, R 20, T 96.7,  O2 sat 97%.   Current weight 137.2 pounds.  General: Awake, Alert, oriented x3, appropriately, follows simple commands, conversant. Sitting up in wheelchair.   NECK: Supple  EXTREMITIES:   Left side hemiparesis.He is able to wiggle his fingers. He can raise his left arm half way.   No lower extremity edema.  SKIN: Multiple skin lesions to lower extremities. Mid calf lesion with redness and slightly warm maddy wound. No drainage. Scratches to his right wrist region.   NEUROLOGIC: CVA with left-sided hemiparesis, pulses palpable  PSYCHIATRIC: Cognition intact.        Assessment/Plan:  1. Skin lesion of right leg     2. Cerebrovascular accident (CVA), unspecified mechanism (H)       Pt with hx of CVA. Mx lesions to LE with hx of picking and scratching. He has gold bond powder for itching. Denies the need for Sarna. He has been refusing his Bactroban ont. Area red on exam. Continue Bactroban two times a day to lesions.       Electronically signed by: Sarah Smith, JORGE LUIS

## 2021-06-19 NOTE — PROGRESS NOTES
Code Status:  POLST AVAILABLE  Visit Type: Problem Visit     Facility:  [facility] [[id number]]         Facility Type: SNF (Skilled Nursing Facility, TCU)     Date of Service 5/10/18    History of Present Illness: Tariq Pinzon is a 68 y.o. male whom I am seeing today at the request the nursing staff.  Past medical history includes CVA with left-sided hemiparesis, hypertension, dysphagia, diabetes mellitus type 2 and COPD.  Patient sitting up in wheelchair.  He is off his oxygen.  Nursing staff report the patient has been refusing his prednisone over the last 3 days.  He is also been refusing his Lantus 10 out of the 14 days.  He denies any shortness of breath.  Denies any cough on exam today.  Lung sounds are clear.  He has been weaned off his oxygen.  Continues on prednisone 2.5 mg daily.  Diabetes type 2.  Has been on glipizide and Lantus.  Review of his blood sugars despite his refusal show that they have been in the upper 90s to mid 100s.  Patient tells me he is watching his diet closely.    Active Ambulatory Problems     Diagnosis Date Noted     CVA (cerebral vascular accident) (H) 03/19/2018     Left hemiparesis (H) 03/19/2018     Hypertension 03/19/2018     Dysarthria 03/19/2018     Dysphagia 03/19/2018     Diabetes mellitus (H) 03/22/2018     COPD (chronic obstructive pulmonary disease) (H) 04/16/2018     Resolved Ambulatory Problems     Diagnosis Date Noted     No Resolved Ambulatory Problems     Past Medical History:   Diagnosis Date     Abnormality of gait      Acute bronchospasm      Adjustment disorder with anxiety      Alcohol use disorder, mild, abuse      Asthma      CAD (coronary artery disease)      COPD (chronic obstructive pulmonary disease) (H)      CVA (cerebral vascular accident) (H)      DM2 (diabetes mellitus, type 2) (H)      Dysarthria      Dysphagia      HLD (hyperlipidemia)      HTN (hypertension)      Hyperglycemia      Hyperlipidemia      Hypertension      Left  hemiparesis (H)      Long-term use of aspirin therapy      Myocardial infarction      Pulmonary nodules      Tobacco abuse        Meds reviewed at the facility.     No Known Allergies      Review of Systems   No fevers or chills. No headache, lightheadedness or dizziness. No SOB, chest pains or palpitations.  Denies any cough.  Reports difficulty with smoke about 2 weeks ago however now is not using his oxygen.  Appetite is good.  He is watching what is eating abiding by a diabetic diet.  No nausea, vomiting, constipation or diarrhea. No dysuria, frequency, burning or pain with urination. Otherwise review of systems are negative.     Physical Exam   PHYSICAL EXAMINATION:  Vital signs: /69, pulse 75, respirations 20 temperature 97.1, O2 sat 94% on room air.. current weight 148.8 pounds.  General: Awake, Alert, oriented x3, appropriately, follows simple commands, conversant  HEENT:PERRLA, Pink conjunctiva, anicteric sclerae, moist oral mucosa.  Slight left-sided facial droop.  Tongue is midline.    NECK: Supple, with  No lymphadenopathy  CARDIOVASCULAR: S1-S2 without murmur gallop.  RESPIRATORY: No wheezes, rales and rhonchi. No cough on exam. No dyspnea at rest.  Patient off oxygen.   EXTREMITIES:   Left side hemiparesis.   SKIN: Warm and dry.   NEUROLOGIC: I CVA with left-sided hemiparesis, pulses palpable  PSYCHIATRIC: Cognition intact. Pleasant affect.         Assessment/Plan:  1. COPD (chronic obstructive pulmonary disease) (H)     2. Diabetes mellitus (H)       Patient with chronic COPD.  He has been on prednisone with a recent wean down to 2.5 mg daily.  He has refused this times 3 days straight.  Denies any shortness of breath.  Denies any cough.  No dyspnea at rest.  Has been weaned off his O2.  Will DC his prednisone.  Diabetes mellitus type 2.  Continues on glipizide 10 mg 2 times daily as well as Lantus 10 units and NovoLog 4 units nightly evening.  He has refused his Lantus 10 times over the last 14  days.  Will DC this.  Sugars have been in the upper 90s to mid 100s.  This should also show improvement with discontinuation of his prednisone.  Patient reports that he is watching his diet.        Electronically signed by: Sarah Smith CNP

## 2021-06-19 NOTE — PROGRESS NOTES
Code Status:  POLST AVAILABLE  Visit Type: Problem Visit     Facility:  [facility] [[id number]]         Facility Type: SNF (Skilled Nursing Facility, TCU)     Date of Service 5/10/18    History of Present Illness: Tariq Pinzon is a 68 y.o. male whom I am seeing today at the request the nursing staff.  Past medical history includes CVA with left-sided hemiparesis, hypertension, dysphagia, diabetes mellitus type 2 and COPD.  Patient continues on O2.  He tells me he does not need this that he is taking it off and the nursing staff keep putting it back on.  We can attempt to wean him.  He denies any shortness of breath.  Continues with wet frothy cough at times.  He is steroid-dependent.  He is continued on prednisone 10 mg daily.  He wants to come off of prednisone.  We did attempt to do so 4 times during his TCU stay which was unsuccessful.  Listening to his lungs he does have some occasional expiratory wheezing.  He will refused nebulizer treatments.  He does have Abreva and albuterol inhaler.  He also complains of the prednisone is making his blood sugars elevated.  Blood sugars are elevated in the evenings up to 300.  He continues on Lantus and glipizide.  Lantus was started during his TCU stay.        Active Ambulatory Problems     Diagnosis Date Noted     CVA (cerebral vascular accident) (H) 03/19/2018     Left hemiparesis (H) 03/19/2018     Hypertension 03/19/2018     Dysarthria 03/19/2018     Dysphagia 03/19/2018     Diabetes mellitus (H) 03/22/2018     COPD (chronic obstructive pulmonary disease) (H) 04/16/2018     Resolved Ambulatory Problems     Diagnosis Date Noted     No Resolved Ambulatory Problems     Past Medical History:   Diagnosis Date     Abnormality of gait      Acute bronchospasm      Adjustment disorder with anxiety      Alcohol use disorder, mild, abuse      Asthma      CAD (coronary artery disease)      COPD (chronic obstructive pulmonary disease) (H)      CVA (cerebral  vascular accident) (H)      DM2 (diabetes mellitus, type 2) (H)      Dysarthria      Dysphagia      HLD (hyperlipidemia)      HTN (hypertension)      Hyperglycemia      Hyperlipidemia      Hypertension      Left hemiparesis (H)      Long-term use of aspirin therapy      Myocardial infarction      Pulmonary nodules      Tobacco abuse        Meds reviewed at the facility.     No Known Allergies      Review of Systems   Refer to HPI otherwise negative.    Physical Exam   PHYSICAL EXAMINATION:  Vital signs: /62, heart rate 78, respirations 16, O2 sat 98% on 1 L, temperature 98.6, current weight 147.5 pounds.  General: Awake, Alert, oriented x3, appropriately, follows simple commands, conversant  HEENT:PERRLA, Pink conjunctiva, anicteric sclerae, moist oral mucosa.  Slight left-sided facial droop.  Tongue is midline.    NECK: Supple, with  No lymphadenopathy  CARDIOVASCULAR: S1-S2 without murmur gallop.  RESPIRATORY: Occasional expiratory wheezing.  O2 on 1 L.  No dyspnea at rest.   EXTREMITIES:   No LE edema.  He is able to move his left lower extremity and kick his foot out.  Move up and down.  Increased movement in the left hand and fingers.  Grasp present.  Some difficulty with overhead raise.  SKIN: Warm and dry.  Multiple scabbed areas to his extremities.Purpura from long-standing steroid use.  NEUROLOGIC: Intact, pulses palpable  PSYCHIATRIC: Cognition intact. Pleasant affect.         Assessment/Plan:  1. COPD (chronic obstructive pulmonary disease) (H)     2. Diabetes mellitus (H)     3. CVA (cerebral vascular accident) (H)       Patient with underlying CVA with left-sided hemiparesis.  He has a history of COPD.  He is steroid-dependent at 10 mg daily.  I attempted to wean him off the prednisone during his TCU stay which was unsuccessful.  Today he is requesting to stop the prednisone.  I will decrease to 5 mg daily.  He does have occasional expiratory wheezing.  He refuses nebulizer treatments.  He is  taking his Abreva and Proventil inhaler.  Will need to monitor breath sounds.  He is also stating that he can go without his oxygen.  Currently using oxygen at 1 L.  Will attempt to wean him off.  We did speak at length about possible need of long-term steroids for maintenance.  Diabetes mellitus type 2.  He has steroid-induced hyperglycemia.  Blood sugars in the 2-300s in the late evening.  He was started on Lantus 10 units during his TCU stay.  Continues on glipizide 10 twice daily.  Morning blood sugars are low.  Will add 4 units of Humalog at supper.  My goal would be to attempt to wean him off the glipizide.  Increase Lantus until he is stabilized.        Electronically signed by: Sarah Smith, JORGE LUIS

## 2021-06-19 NOTE — PROGRESS NOTES
[facility]      Facility:    [facility] [[id number]]  Code Status: UNKNOWN      Chief Complaint/Reason for Visit:  Chief Complaint   Patient presents with     H & P     History of CVA with left-sided hemiparesis.  Dysphagia dysphasia type 2 diabetes COPD which is oxygen dependent at this time.  Which is oxygen dependent at this time.       HPI:   Tariq is a 68 y.o. male who was residing here at the [facility] and then transferred here over to the long-term care.  He currently does have type 2 diabetes COPD and CVA with left-sided hemiparesis as well as hypertension.  This has improved.  His blood sugars are pretty much in normal range with .  He is on prednisone for COPD and he was being weaned off O2 however he claims because of the smog in the air from the outside he needed 2 L today.  However he satting 97% I turned out to 1-1/2 L and he did not exhibit any shortness of breath.  He is doing well at this time denies any pain fevers chills nausea vomiting diarrhea change in vision irritates his mild weakness one side of the chest pain shortness of breath.  Remainder review of systems is negative.    Past Medical History:  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     CVA (cerebral vascular accident) (H)      DM2 (diabetes mellitus, type 2) (H)      Dysarthria      Dysphagia      HLD (hyperlipidemia)      HTN (hypertension)      Hyperglycemia      Hyperlipidemia      Hypertension      Left hemiparesis (H)      Long-term use of aspirin therapy      Myocardial infarction     NSTEMI     Pulmonary nodules     left     Tobacco abuse            Surgical History:  History reviewed. No pertinent surgical  history.    Family History:   Family History   Problem Relation Age of Onset     Diabetes Mother      Asthma Mother      Lung cancer Father      Asthma Maternal Grandmother        Social History:    Social History     Social History     Marital status:      Spouse name: N/A     Number of children: N/A     Years of education: N/A     Social History Main Topics     Smoking status: Former Smoker     Packs/day: 2.00     Years: 38.00     Smokeless tobacco: Former User     Alcohol use No     Drug use: None     Sexual activity: Not Asked     Other Topics Concern     None     Social History Narrative          Review of Systems   Constitutional:        Please see history of chief complaint.  Patient has no issues at this time claims he is doing well.  And back to baseline.       Vitals:    08/14/18 1327   BP: 128/69   Pulse: 75   Resp: 20   Temp: 97.1  F (36.2  C)   SpO2: 97%       Physical Exam   Constitutional: No distress.   HENT:   Head: Normocephalic and atraumatic.   Eyes: Conjunctivae and EOM are normal. Right eye exhibits no discharge. Left eye exhibits no discharge.   Neck: Normal range of motion. Neck supple. No thyromegaly present.   Pulmonary/Chest:   Patient is poor inspiratory volume with slight end expiratory wheeze but no crackles or rales.   Abdominal: Soft. Bowel sounds are normal. He exhibits no distension. There is no tenderness.   Musculoskeletal: He exhibits no edema or tenderness.   Neurological: He is alert.   Skin: Skin is warm and dry. He is not diaphoretic.   Psychiatric: He has a normal mood and affect. His behavior is normal.       Medication List:  Current Outpatient Prescriptions   Medication Sig     albuterol (PROAIR HFA;PROVENTIL HFA;VENTOLIN HFA) 90 mcg/actuation inhaler Inhale 2 puffs every 4 (four) hours.      aspirin 325 MG EC tablet Take 325 mg by mouth daily.     atorvastatin (LIPITOR) 40 MG tablet Take 40 mg by mouth at bedtime.     fluticasone-salmeterol (ADVAIR) 250-50  mcg/dose DISKUS Inhale 1 puff 2 (two) times a day.     glipiZIDE (GLUCOTROL XL) 10 MG 24 hr tablet Take 10 mg by mouth 2 (two) times a day. And 5 mg at noon      ipratropium (ATROVENT HFA) 17 mcg/actuation inhaler Inhale 2 puffs every 6 (six) hours.     lisinopril (PRINIVIL,ZESTRIL) 20 MG tablet Take 20 mg by mouth daily.     metoprolol tartrate (LOPRESSOR) 25 MG tablet Take 25 mg by mouth 2 (two) times a day.     nitroglycerin (NITROSTAT) 0.4 MG SL tablet Place 0.4 mg under the tongue every 5 (five) minutes as needed for chest pain.     omeprazole (PRILOSEC OTC) 20 MG tablet Take 20 mg by mouth daily.     predniSONE (DELTASONE) 10 mg tablet Take 5 mg by mouth daily. Patient taking 15 mg started today ×5 days then should reduce to 10 mg ×5 days then 5 mg daily indefinitely.      sitaGLIPtin (JANUVIA) 100 MG tablet Take 100 mg by mouth daily.       Labs: There are no recent labs on chart at this time.  His last labs I did review a 5/21/2018 and 5/9/2018 basic metabolic profile was within normal limits.  Brain injury peptide was 24 and PSA was 2.4.  I do not have an A1c on him at this time.      Assessment:    ICD-10-CM    1. COPD (chronic obstructive pulmonary disease) (H) J44.9    2. Left hemiparesis (H) G81.94    3. Diabetes mellitus (H) E11.9    4. Dysphagia R13.10        Plan: Plan at this time encourage the use of prednisone when needed however we will continue to wean him off his oxygen.  Given the humidity today it is no wonder why he needs 2 L.  But his O2 sat to 95% his respiratory rate is 20.  He has no chest pain or shortness of breath at this time so we will continue to wean and give him a trial at 1.5 L.  He does have left hemiparesis which is improving and diabetes which is in adequate control at this time.  He does have dysphagia which is being treated at this time he is followed by speech therapy.  I will continue to monitor above medical problems recommend next time nurse practitioner does see  patients might want to order a A1c I do not see one on the chart at this time.  I will continue to monitor above medical problems and no other changes to care plan at this time.        Electronically signed by: Elver Murphy,

## 2021-06-19 NOTE — LETTER
Letter by Sarah Smith CNP at      Author: Sarah Smith CNP Service: -- Author Type: --    Filed:  Encounter Date: 3/18/2019 Status: (Other)         Patient: Tariq Pinzon   MR Number: [id number]   YOB: 1950   Date of Visit: 3/18/2019     Code Status:  Full Code  Visit Type: Problem Visit     Facility:  [facility] [[id number]]         Facility Type: Long Term Care      History of Present Illness: Tariq Pinzon is a 69 y.o. male whom I am seeing today for follow up of recent pneumonia.  Past medical history includes COPD, CVA with left-sided paresis, hypertension, dysphagia, diabetes type 2, CAD and hypertension.  Patient with recent episode in which she became very weak with slurred speech.  He does have a history of CVA.  He was sent to the ER.  He was found to have left pleural effusion with mild leukocytosis.  White blood cell count 11.3.  He was placed on oral antibiotics.  He continues on Keflex.  However he was on this prior.  He did complete his Cefdinir.  He is afebrile.  He is off his oxygen during the day.  He does wear oxygen at night.  That is his baseline.  Continues on nebulizers.  Cough is dry.  He states he is only producing white phlegm.  There was a call from the hospital to his daughter in regards to a urgent need for follow-up with pulmonology.  There were some suspicious of nodule versus mass according to the notes in the record.  I do not have his hospitalization records.  He was not at a [facility] facility.  He is refusing follow-up pulmonary consult as well as chest CT.  I did speak with him at length about this today.  I talked with him in regards to possible lung CA.  He tells me he wishes not to know if he does have cancer.  He does report feelings of anger towards his children today.  He also told nursing staff that he would like to be a DNR/DNI.        Active Ambulatory Problems     Diagnosis Date Noted   ? CVA (cerebral vascular  accident) (H) 03/19/2018   ? Left hemiparesis (H) 03/19/2018   ? Hypertension 03/19/2018   ? Dysarthria 03/19/2018   ? Dysphagia 03/19/2018   ? Diabetes mellitus (H) 03/22/2018   ? COPD (chronic obstructive pulmonary disease) (H) 04/16/2018     Resolved Ambulatory Problems     Diagnosis Date Noted   ? No Resolved Ambulatory Problems     Past Medical History:   Diagnosis Date   ? Abnormality of gait    ? Acute bronchospasm    ? Adjustment disorder with anxiety    ? Alcohol use disorder, mild, abuse    ? Asthma    ? CAD (coronary artery disease)    ? COPD (chronic obstructive pulmonary disease) (H)    ? CVA (cerebral vascular accident) (H)    ? DM2 (diabetes mellitus, type 2) (H)    ? Dysarthria    ? Dysphagia    ? HLD (hyperlipidemia)    ? HTN (hypertension)    ? Hyperglycemia    ? Hyperlipidemia    ? Hypertension    ? Left hemiparesis (H)    ? Long-term use of aspirin therapy    ? Myocardial infarction (H)    ? Pulmonary nodules    ? Tobacco abuse        Meds reviewed at the facility.     No Known Allergies      Review of Systems   No fevers or chills. No headache, lightheadedness or dizziness.  Denies shortness of breath or chest pain.  Only using oxygen at night.  Reports only a dry cough.  Appetite is good. No nausea, vomiting, constipation or diarrhea. No dysuria, frequency, burning or pain with urination.  Reports feelings of anger towards his children.  Otherwise review of systems are negative.       Physical Exam   PHYSICAL EXAMINATION  Vital signs: /69, P 62, R 17, T 98.4, O2 sat 90%.   Current weight 140.6 pounds.  General: Awake, Alert, oriented x3, appropriately, follows simple commands, conversant. Sitting up in wheelchair.   HEENT: Slight left-sided facial droop.  Tongue is midline.   NECK: Supple, with  No lymphadenopathy  CARDIOVASCULAR: S1-S2 without murmur gallop.  RESPIRATORY: Patient off his oxygen today.  No wheezes rales or rhonchi.  Dry cough.  ABDOMEN: Soft nondistended.  EXTREMITIES:    Left side hemiparesis.  No lower extremity edema.  Moves all extremities.  SKIN: Multiple skin lesions to lower extremities healing.  NEUROLOGIC: CVA with left-sided hemiparesis, pulses palpable  PSYCHIATRIC: Cognition intact.  Angry.      Assessment/Plan:  1. Chronic obstructive pulmonary disease with acute lower respiratory infection (H)     2. Pneumonia of left lower lobe due to infectious organism (H)       Patient with underlying COPD.  Recently treated for pneumonia and bronchitis.  Recent episode of increased lethargy with slurred speech.  He was seen at the ER.  Treated with left lower lobe pneumonia.  According to the notes there was a CT scan which showed possible nodules versus mass.  He was to have a follow-up CT scan as well as pulmonology visit however he is refusing this.  He also refuses again today.  His wishes to not know if he has lung cancer.  He continues to use oxygen at night.  Is completed his cefdinir.  He does continue on some Keflex for lower extremity lesions.  He has 3 more days of this.  He continues on nebulizer treatments.  Cough improved.  He has completed his prednisone taper.  Will repeat CBC on Thursday.  Patient wishes to be DNR/DNI.  We will change his CODE STATUS.      Electronically signed by: Sarah Smith, JORGE LUIS

## 2021-06-20 NOTE — PROGRESS NOTES
Code Status:  POLST AVAILABLE  Visit Type: Problem Visit     Facility:  [facility] [[id number]]         Facility Type: SNF (Skilled Nursing Facility, TCU)     Date of Service 5/10/18    History of Present Illness: Tariq Pinzon is a 68 y.o. male whom I am seeing today following choking episode in the dining room.  Past medical history includes CVA with left-sided hemiparesis, hypertension, dysphagia, diabetes mellitus type 2 and COPD.  Patient with underlying CVA.  He has denied dysphagia.  This is improved over time.  Patient eating soup in dining room.  Got choked on a being.  He was able to clear this on his own.  Lung sounds clear post episode.  He did have some shortness of breath following the episode.  Oxygen was placed at 2 L nasal cannula.    Active Ambulatory Problems     Diagnosis Date Noted     CVA (cerebral vascular accident) (H) 03/19/2018     Left hemiparesis (H) 03/19/2018     Hypertension 03/19/2018     Dysarthria 03/19/2018     Dysphagia 03/19/2018     Diabetes mellitus (H) 03/22/2018     COPD (chronic obstructive pulmonary disease) (H) 04/16/2018     Resolved Ambulatory Problems     Diagnosis Date Noted     No Resolved Ambulatory Problems     Past Medical History:   Diagnosis Date     Abnormality of gait      Acute bronchospasm      Adjustment disorder with anxiety      Alcohol use disorder, mild, abuse      Asthma      CAD (coronary artery disease)      COPD (chronic obstructive pulmonary disease) (H)      CVA (cerebral vascular accident) (H)      DM2 (diabetes mellitus, type 2) (H)      Dysarthria      Dysphagia      HLD (hyperlipidemia)      HTN (hypertension)      Hyperglycemia      Hyperlipidemia      Hypertension      Left hemiparesis (H)      Long-term use of aspirin therapy      Myocardial infarction      Pulmonary nodules      Tobacco abuse        Meds reviewed at the facility.     No Known Allergies      Review of Systems   Refer to HPI otherwise negative.    Physical  Exam   PHYSICAL EXAMINATION  Vital signs: /75, heart rate 70, respirations 18, O2 sat 97% on room air.  Current temperature 97.6.  Current weight 147.8 pounds.  General: Awake, Alert, oriented x3, appropriately, follows simple commands, conversant.  Sitting up at table in the dining room.  Patient in coughing spell with difficulty clearing.  HEENT: Slight left-sided facial droop.  Tongue is midline.  Watery eyes.  Red face.  NECK: Supple, with  No lymphadenopathy  CARDIOVASCULAR: S1-S2 without murmur gallop.  RESPIRATORY: No wheezes, rales and rhonchi.  Coughing excessively attempting to clear throat.  Was able to however with increased shortness of breath following episode.  Oxygen placed on at 2 L nasal cannula.   EXTREMITIES:   Left side hemiparesis.   SKIN: Warm and dry.   NEUROLOGIC: CVA with left-sided hemiparesis, pulses palpable  PSYCHIATRIC: Cognition intact.        Assessment/Plan:  1. CVA (cerebral vascular accident) (H)     2. 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 Choking episode       Patient eating soup in the dining room when he had a choking episode.  He was able to clear a bean from the soup on his own.  Lung sounds clear post episode.  He did have some increased shortness of breath.  O2 was applied at 2 L nasal cannula.  His history of CVA with left-sided hemiparesis.  No recent dysphagia.  He is tolerating a regular diet.  However will have speech eval.        Electronically signed by: Sarah Smith, JORGE LUIS

## 2021-06-20 NOTE — PROGRESS NOTES
Code Status:  POLST AVAILABLE  Visit Type: Review Of Multiple Medical Conditions     Facility:  [facility] [[id number]]         Facility Type: SNF (Skilled Nursing Facility, TCU)     Date of Service 5/10/18    History of Present Illness: Tariq Pinzon is a 68 y.o. male whom I am seeing today for follow-up of chronic medical conditions.  Past medical history includes CVA with left-sided hemiparesis, hypertension, dysphagia, diabetes mellitus type 2 and COPD.  Patient with recent increased dose of oxygen.  Oxygen on 2 L nasal cannula.  He tells me that the air pollution and of breath.  He is now off of steroids.  No cough.  Lung sounds clear.  Diabetes well controlled.  Now off insulin.  He is very cognizant of his diet.  Pressures well controlled.  Able to self propel his wheelchair around the unit.  He does have pseudobulbar affect.  Denies the need for any medication    Active Ambulatory Problems     Diagnosis Date Noted     CVA (cerebral vascular accident) (H) 03/19/2018     Left hemiparesis (H) 03/19/2018     Hypertension 03/19/2018     Dysarthria 03/19/2018     Dysphagia 03/19/2018     Diabetes mellitus (H) 03/22/2018     COPD (chronic obstructive pulmonary disease) (H) 04/16/2018     Resolved Ambulatory Problems     Diagnosis Date Noted     No Resolved Ambulatory Problems     Past Medical History:   Diagnosis Date     Abnormality of gait      Acute bronchospasm      Adjustment disorder with anxiety      Alcohol use disorder, mild, abuse      Asthma      CAD (coronary artery disease)      COPD (chronic obstructive pulmonary disease) (H)      CVA (cerebral vascular accident) (H)      DM2 (diabetes mellitus, type 2) (H)      Dysarthria      Dysphagia      HLD (hyperlipidemia)      HTN (hypertension)      Hyperglycemia      Hyperlipidemia      Hypertension      Left hemiparesis (H)      Long-term use of aspirin therapy      Myocardial infarction      Pulmonary nodules      Tobacco abuse         Meds reviewed at the facility.     No Known Allergies      Review of Systems   No fevers or chills. No headache, lightheadedness or dizziness. No SOB, chest pains or palpitations.  Denies any cough.  Patient using oxygen more often.  Reports humidity and air pollution on others increased shortness of breath.  Is improved with use of oxygen. Appetite is good.  He is watching what is eating abiding by a diabetic diet.  No nausea, vomiting, constipation or diarrhea. No dysuria, frequency, burning or pain with urination. Otherwise review of systems are negative.     Physical Exam   PHYSICAL EXAMINATION  Vital signs: /64, heart rate 66, respirations 22, temperature 97.3, O2 sat 99% 2 L.  Current weight 147.8 pounds.  General: Awake, Alert, oriented x3, appropriately, follows simple commands, conversant  HEENT:PERRLA, Pink conjunctiva, anicteric sclerae, moist oral mucosa.  Slight left-sided facial droop.  Tongue is midline.    NECK: Supple, with  No lymphadenopathy  CARDIOVASCULAR: S1-S2 without murmur gallop.  RESPIRATORY: No wheezes, rales and rhonchi. No cough on exam. No dyspnea at rest.  Patient with oxygen on at 2 L nasal cannula.   EXTREMITIES:   Left side hemiparesis.   SKIN: Warm and dry.   NEUROLOGIC: CVA with left-sided hemiparesis, pulses palpable  PSYCHIATRIC: Cognition intact. Pleasant affect.  Crying uncontrollably at times.  Pseudobulbar affect noted.        Assessment/Plan:  1. COPD (chronic obstructive pulmonary disease) (H)     2. CVA (cerebral vascular accident) (H)     3. Left hemiparesis (H)     4. Diabetes mellitus (H)     5. Pseudobulbar affect       Patient with history of CVA with hemiparesis.  Left upper extremity weakness.  He is able to move his lower extremities.  He is able to self propel his wheelchair.  Diabetes.  Now off his insulin.  Blood sugars are satisfactory controlled.  COPD.  Increasing dyspnea secondary to air pollution and humidity.  He is now using oxygen at 2 L  more consistently.  He is off his steroids.  He was refusing these.  Lung sounds decreased in the bases however overall clear.  No dyspnea at rest.  We will continue to monitor.        Electronically signed by: Sarah Smith CNP

## 2021-06-21 NOTE — PROGRESS NOTES
Code Status:  POLST AVAILABLE  Visit Type: Problem Visit     Facility:  [facility] [[id number]]         Facility Type: SNF (Skilled Nursing Facility, TCU)     Date of Service 5/10/18    History of Present Illness: Tariq Pinzon is a 68 y.o. male whom I am seeing at the request of the pt.  Past medical history includes CVA with left-sided hemiparesis, hypertension, dysphagia, diabetes mellitus type 2 and COPD.  Patient with underlying CVA with left sides hosea plegia. He has start of contracture to left wrist. He does have a hand splint however he does not always wear it. He shows me at the bedside exercises he practices for his arm, hand and wrist. Symptoms consistent with pseudobulbar affect. He is requesting today to stop more of his meds including his advair inh and diabetes meds. His blood sugars are now at a steady state. I advise against this. He continues to require oxygen at times. His lungs are clear. He recently had a choking episode in the dining room.     Active Ambulatory Problems     Diagnosis Date Noted     CVA (cerebral vascular accident) (H) 03/19/2018     Left hemiparesis (H) 03/19/2018     Hypertension 03/19/2018     Dysarthria 03/19/2018     Dysphagia 03/19/2018     Diabetes mellitus (H) 03/22/2018     COPD (chronic obstructive pulmonary disease) (H) 04/16/2018     Resolved Ambulatory Problems     Diagnosis Date Noted     No Resolved Ambulatory Problems     Past Medical History:   Diagnosis Date     Abnormality of gait      Acute bronchospasm      Adjustment disorder with anxiety      Alcohol use disorder, mild, abuse      Asthma      CAD (coronary artery disease)      COPD (chronic obstructive pulmonary disease) (H)      CVA (cerebral vascular accident) (H)      DM2 (diabetes mellitus, type 2) (H)      Dysarthria      Dysphagia      HLD (hyperlipidemia)      HTN (hypertension)      Hyperglycemia      Hyperlipidemia      Hypertension      Left hemiparesis (H)      Long-term use  of aspirin therapy      Myocardial infarction      Pulmonary nodules      Tobacco abuse        Meds reviewed at the facility.     No Known Allergies      Review of Systems   No fevers or chills. No headache, lightheadedness or dizziness. No SOB, chest pains or palpitations. Appetite is good. No nausea, vomiting, constipation or diarrhea. No dysuria, frequency, burning or pain with urination. Otherwise review of systems are negative.       Physical Exam   PHYSICAL EXAMINATION  Vital signs: /67, heart rate 72, respirations 16, temperature 97.9, O2 sat 93% on room air.  Current weight 147.8 pounds.  General: Awake, Alert, oriented x3, appropriately, follows simple commands, conversant. .  HEENT: Slight left-sided facial droop.  Tongue is midline.   NECK: Supple, with  No lymphadenopathy  CARDIOVASCULAR: S1-S2 without murmur gallop.  RESPIRATORY: No wheezes, rales and rhonchi.   EXTREMITIES:   Left side hemiparesis. He does start of contracture to left wrist. He is able to tolerate PROM with wrist and hand. He shows me exercises he is doing at the side of bed to help arm and hand.   SKIN: Warm and dry.   NEUROLOGIC: CVA with left-sided hemiparesis, pulses palpable  PSYCHIATRIC: Cognition intact. Uncontrollable crying at times.         Assessment/Plan:  1. CVA (cerebral vascular accident) (H)     2. 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 Diabetes mellitus (H)     4. COPD (chronic obstructive pulmonary disease) (H)     5. Pseudobulbar affect       Pt with hx of CVA with left sided hemiparesis. He is starting to develop contracture to his left wrist. He does attempt to exercise the joint in his room. He has a left wrist splint and I have asked him to wear it more often. Dysphagia with recent choking episode in dining room. I did ask for ST to evaluate. DM blood sugars satisfactory controlled with glipizide. He is no longer on [place]. He is wishing to stop glipizide. I have advised against this and reviewed  causes of stroke. COPD stable. He continues on advair and albuterol. He wishes to d/c his Advair. Will continue. He does use occasional oxygen at 2L NC. No cough or increased production of phlegm. Lungs are CTA. Pseudobulbar affect with occasional outburst of crying. He denies the need for med.     Electronically signed by: Sarah Smith, JORGE LUIS

## 2021-06-21 NOTE — PROGRESS NOTES
[facility]    Facility:   [facility] [[id number]]   Code Status: POLST AVAILABLE      CHIEF COMPLAINT/REASON FOR VISIT:  Chief Complaint   Patient presents with     Review Of Multiple Medical Conditions     CVA with left-sided hemiparesis, hand contractures with wrist splint, hypertension, dysphasia, diabetes, COPD.       HISTORY:      HPI: Tariq is a 68 y.o. male who resides here at the [place] long-term care for multiple medical problems he does have left-sided hemiparesis from recent stroke and has underlying COPD.  There is mention in the notes that he does have some pseudo-pseudobulbar bulbar effect at this time is not currently not being treated for this.  He is on Advair and he does have diabetes and is on glipizide.  But sugars have been running between 70 and 137.    He was seen by nurse practitioner early on for patient wanted to address to stop some of his medications goes by diabetic medications as well as his Advair.  Given his COPD his diabetes blood sugars have been in good control I would I did recommend against this.    Patient has no new concerns today he denies any fevers chills nausea vomiting diarrhea and claims he is doing okay he is eating and drinking without difficulty.    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     CVA (cerebral vascular accident) (H)      DM2 (diabetes mellitus, type 2) (H)      Dysarthria      Dysphagia      HLD (hyperlipidemia)      HTN (hypertension)      Hyperglycemia      Hyperlipidemia      Hypertension      Left hemiparesis (H)      Long-term use of aspirin therapy      Myocardial infarction (H)     NSTEMI     Pulmonary nodules     left     Tobacco abuse              Family  History   Problem Relation Age of Onset     Diabetes Mother      Asthma Mother      Lung cancer Father      Asthma Maternal Grandmother      Social History     Social History     Marital status:      Spouse name: N/A     Number of children: N/A     Years of education: N/A     Social History Main Topics     Smoking status: Former Smoker     Packs/day: 2.00     Years: 38.00     Smokeless tobacco: Former User     Alcohol use No     Drug use: None     Sexual activity: Not Asked     Other Topics Concern     None     Social History Narrative         Review of Systems   Constitutional:        Patient denies any pain fevers chills nausea vomiting diarrhea change in vision hearing taste or smell weakness one-sided chest pain shortness of breath.  He still has left-sided hemiparesis but he can move his leg left leg okay.  He denies any other problems at this time the remainder the review of systems negative.       .  Vitals:    10/17/18 1044   BP: 126/67   Pulse: 72   Resp: 16   Temp: 97.9  F (36.6  C)   SpO2: 99%       Physical Exam   Constitutional: He is oriented to person, place, and time. No distress.   HENT:   Head: Normocephalic and atraumatic.   Mouth/Throat: No oropharyngeal exudate.   Eyes: Conjunctivae are normal. Right eye exhibits no discharge. Left eye exhibits no discharge. No scleral icterus.   Neck: Neck supple. No thyromegaly present.   Cardiovascular: Normal rate.    No murmur heard.  Pulmonary/Chest: Effort normal and breath sounds normal. No respiratory distress. He has no wheezes. He has no rales.   Abdominal: Soft. Bowel sounds are normal. He exhibits no distension. There is no tenderness.   Musculoskeletal: He exhibits no edema.   Neurological: He is alert and oriented to person, place, and time.   Left-sided hemiparesis however he can move his left leg without difficulty.   Skin: Skin is warm and dry. He is not diaphoretic.   Psychiatric: He has a normal mood and affect. His behavior is  normal.         LABS:       ASSESSMENT:      ICD-10-CM    1. CVA (cerebral vascular accident) (H) I63.9    2. Dysphasia due to cerebrovascular disease I69.921    3. Diabetes mellitus (H) E11.9    4. Left hemiparesis (H) G81.94    5. COPD (chronic obstructive pulmonary disease) (H) J44.9        PLAN: At this time is to continue to monitor above medical problems I do agree that pulling back in his COPD medications would not be a good idea or his diabetic medications.  His left hemiparesis stayed about the same and he continues to follow with speech therapy.  I will continue to monitor above medical problems and no other changes to care plan at this time.      Total  minutes of which % was spent counseling and coordination of care of the above plan.    Electronically signed by: Elver Murphy DO

## 2021-06-22 NOTE — PROGRESS NOTES
Code Status:  POLST AVAILABLE  Visit Type: Problem Visit     Facility:  [facility] [[id number]]         Facility Type: SNF (Skilled Nursing Facility, TCU)     Date of Service 5/10/18    History of Present Illness: Tariq Pinzon is a 68 y.o. male whom I am seeing today for cough. Past medical history includes CVA with left-sided hemiparesis, hypertension, dysphagia, diabetes mellitus type 2 and COPD. Pt with increased coughing he reports over the last week. He is using oxygen more. He has oxygen on @ 2L today. He is shortness of breath at rest with talking. He has coarse wet cough. He is a-febrile.       Active Ambulatory Problems     Diagnosis Date Noted     CVA (cerebral vascular accident) (H) 03/19/2018     Left hemiparesis (H) 03/19/2018     Hypertension 03/19/2018     Dysarthria 03/19/2018     Dysphagia 03/19/2018     Diabetes mellitus (H) 03/22/2018     COPD (chronic obstructive pulmonary disease) (H) 04/16/2018     Resolved Ambulatory Problems     Diagnosis Date Noted     No Resolved Ambulatory Problems     Past Medical History:   Diagnosis Date     Abnormality of gait      Acute bronchospasm      Adjustment disorder with anxiety      Alcohol use disorder, mild, abuse      Asthma      CAD (coronary artery disease)      COPD (chronic obstructive pulmonary disease) (H)      CVA (cerebral vascular accident) (H)      DM2 (diabetes mellitus, type 2) (H)      Dysarthria      Dysphagia      HLD (hyperlipidemia)      HTN (hypertension)      Hyperglycemia      Hyperlipidemia      Hypertension      Left hemiparesis (H)      Long-term use of aspirin therapy      Myocardial infarction (H)      Pulmonary nodules      Tobacco abuse        Meds reviewed at the facility.     No Known Allergies      Review of Systems   No fevers or chills. No headache, lightheadedness or dizziness. Increased shortness of breath over the last 3 days, He is wearing oxygen more. Increased cough.  No chest pains or palpitations.  Appetite is good. No nausea, vomiting, constipation or diarrhea. No dysuria, frequency, burning or pain with urination. Otherwise review of systems are negative.       Physical Exam   PHYSICAL EXAMINATION  Vital signs: BP 99/60, P 88, R 16, T 98.4, O2 sat 99%. Current weight 138.2 pounds.  General: Awake, Alert, oriented x3, appropriately, follows simple commands, conversant. Sitting up in wheelchair.   HEENT: Slight left-sided facial droop.  Tongue is midline.   NECK: Supple, with  No lymphadenopathy  CARDIOVASCULAR: S1-S2 without murmur gallop.  RESPIRATORY: Scattered rhonchi throughout. Wet coarse non productive cough. shortness of breath at rest. Oxygen on @ 2L NC.    EXTREMITIES:   Left side hemiparesis. Contracture to left wrist. He is able to tolerate PROM with wrist and hand.   SKIN: Warm and dry.   NEUROLOGIC: CVA with left-sided hemiparesis, pulses palpable  PSYCHIATRIC: Cognition intact.  Pleasant affect.        Assessment/Plan:  1. Chronic obstructive pulmonary disease with acute lower respiratory infection (H)     2. Cerebrovascular accident (CVA), unspecified mechanism (H)     3. Left hemiparesis (H)       Pt with underlying COPD. He has had increased cough and congestion over the last week. CXR ordered. CXR showed no pulmonary vascular congestion. Peribronchial thickening in the mid and lower lung fields bilaterally consistent with bronchitis. No superimposed focal infiltrate. I did order Doxycycline 100 mg two times a day X 7 days and prednisone 10 mg daily X 5 days then 5 mg X 3 days then d/c. He continues on O2 @ 2L NC and nebulizer treatments.       Electronically signed by: Sarah Smith CNP

## 2021-06-22 NOTE — PROGRESS NOTES
Code Status:  POLST AVAILABLE  Visit Type: Review Of Multiple Medical Conditions     Facility:  [facility] [[id number]]         Facility Type: SNF (Skilled Nursing Facility, TCU)     Date of Service 5/10/18    History of Present Illness: Tariq Pinzon is a 68 y.o. male whom I am seeing today for follow-up of chronic medical conditions.  Past medical history includes CVA with left-sided hemiparesis, hypertension, dysphagia, diabetes mellitus type 2 and COPD.  Patient sitting up in wheelchair.  Denies any pain.  He has recently started therapy at the [facility] [place].  He tells me he is walking there with a hemiwalker.  I did talk with the nurse manager in regards to starting to walk him with a hemiwalker at the facility.  He is gaining strength.  No pseudobulbar affect symptoms noted on today's visit.  Very pleasant and chatty.  He is increased his alcohol from 1 ounce to 4 ounces.  COPD.  Stable.  No cough on exam.  Lung sounds clear.  Using less oxygen.  He does have a as needed.  Diabetes.  Satisfactory controlled with glipizide.  Hypertension.  Blood pressure within normal limits.    Active Ambulatory Problems     Diagnosis Date Noted     CVA (cerebral vascular accident) (H) 03/19/2018     Left hemiparesis (H) 03/19/2018     Hypertension 03/19/2018     Dysarthria 03/19/2018     Dysphagia 03/19/2018     Diabetes mellitus (H) 03/22/2018     COPD (chronic obstructive pulmonary disease) (H) 04/16/2018     Resolved Ambulatory Problems     Diagnosis Date Noted     No Resolved Ambulatory Problems     Past Medical History:   Diagnosis Date     Abnormality of gait      Acute bronchospasm      Adjustment disorder with anxiety      Alcohol use disorder, mild, abuse      Asthma      CAD (coronary artery disease)      COPD (chronic obstructive pulmonary disease) (H)      CVA (cerebral vascular accident) (H)      DM2 (diabetes mellitus, type 2) (H)      Dysarthria      Dysphagia      HLD (hyperlipidemia)       HTN (hypertension)      Hyperglycemia      Hyperlipidemia      Hypertension      Left hemiparesis (H)      Long-term use of aspirin therapy      Myocardial infarction (H)      Pulmonary nodules      Tobacco abuse        Meds reviewed at the facility.     No Known Allergies      Review of Systems   No fevers or chills. No headache, lightheadedness or dizziness. No SOB, chest pains or palpitations. Appetite is good. No nausea, vomiting, constipation or diarrhea. No dysuria, frequency, burning or pain with urination. Otherwise review of systems are negative.       Physical Exam   PHYSICAL EXAMINATION  Vital signs: BP 98/58, heart rate 82, respirations 18 temperature 97.7, O2 sat 95% on room air.  Current weight 138.2 pounds.  General: Awake, Alert, oriented x3, appropriately, follows simple commands, conversant. .  HEENT: Slight left-sided facial droop.  Tongue is midline.   NECK: Supple, with  No lymphadenopathy  CARDIOVASCULAR: S1-S2 without murmur gallop.  RESPIRATORY: No wheezes, rales and rhonchi.   EXTREMITIES:   Left side hemiparesis. Contracture to left wrist. He is able to tolerate PROM with wrist and hand.   SKIN: Warm and dry.   NEUROLOGIC: CVA with left-sided hemiparesis, pulses palpable  PSYCHIATRIC: Cognition intact.  Pleasant affect.        Assessment/Plan:  1. Cerebrovascular accident (CVA), unspecified mechanism (H)     2. Left hemiparesis (H)     3. 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 Type 2 diabetes mellitus with complication, without long-term current use of insulin (H)     5. Chronic obstructive pulmonary disease, unspecified COPD type (H)     6. Pseudobulbar affect     7. Essential hypertension           Pt with hx of CVA with left sided hemiparesis.  Patient has started therapy at [facility] out patiently.  He is ambulating there with a hemiwalker.  I would like to see patient walk with hemiwalker at the facility.  Nursing will reach out to therapy for guidelines.  History of  dysphagia.  Continues on downgraded diet.  Diabetes.  Blood sugars well controlled on glipizide.  COPD.  He is using less oxygen.  Continues on Advair and albuterol.  Hypertension.  Blood pressure satisfactory controlled on lisinopril and metoprolol.  Pseudobulbar affect.  No crying on today's visit.  He is requesting increase alcohol intake.  Will need to be very careful with this.  I will allow him 4 ounces at bedtime.        Electronically signed by: Sarah Smith, JORGE LUIS

## 2021-06-22 NOTE — PROGRESS NOTES
Code Status:  POLST AVAILABLE  Visit Type: Problem Visit     Facility:  [facility] [[id number]]         Facility Type: SNF (Skilled Nursing Facility, TCU)     Date of Service 5/10/18    History of Present Illness: Tariq Pinzon is a 68 y.o. male whom I am seeing today for follow up of recent COPD exacerbation. Past medical history includes CVA with left-sided hemiparesis, hypertension, dysphagia, diabetes mellitus type 2 and COPD. Today pt off oxygen. He tells me he continues to use it at night. He is a-febrile. He continues on Doxycycline and Prednisone taper. He continues with occasional coarse cough. Lungs with occasional expiratory wheeze. Much improved from last visit.       Active Ambulatory Problems     Diagnosis Date Noted     CVA (cerebral vascular accident) (H) 03/19/2018     Left hemiparesis (H) 03/19/2018     Hypertension 03/19/2018     Dysarthria 03/19/2018     Dysphagia 03/19/2018     Diabetes mellitus (H) 03/22/2018     COPD (chronic obstructive pulmonary disease) (H) 04/16/2018     Resolved Ambulatory Problems     Diagnosis Date Noted     No Resolved Ambulatory Problems     Past Medical History:   Diagnosis Date     Abnormality of gait      Acute bronchospasm      Adjustment disorder with anxiety      Alcohol use disorder, mild, abuse      Asthma      CAD (coronary artery disease)      COPD (chronic obstructive pulmonary disease) (H)      CVA (cerebral vascular accident) (H)      DM2 (diabetes mellitus, type 2) (H)      Dysarthria      Dysphagia      HLD (hyperlipidemia)      HTN (hypertension)      Hyperglycemia      Hyperlipidemia      Hypertension      Left hemiparesis (H)      Long-term use of aspirin therapy      Myocardial infarction (H)      Pulmonary nodules      Tobacco abuse        Meds reviewed at the facility.     No Known Allergies      Review of Systems   No fevers or chills. No headache, lightheadedness or dizziness. Pt reports shortness of breath improving. He  continues with occasional cough. He is only wearing oxygen at night now.   No chest pains or palpitations. Appetite is good. No nausea, vomiting, constipation or diarrhea. No dysuria, frequency, burning or pain with urination. Otherwise review of systems are negative.       Physical Exam   PHYSICAL EXAMINATION  Vital signs: BP 99/60, respirations 16, heart rate 88, temperature 98.9,  O2 sat 96% on room air.  Current weight 138.2 pounds.  General: Awake, Alert, oriented x3, appropriately, follows simple commands, conversant. Sitting up in wheelchair.   HEENT: Slight left-sided facial droop.  Tongue is midline.   NECK: Supple, with  No lymphadenopathy  CARDIOVASCULAR: S1-S2 without murmur gallop.  RESPIRATORY: Off oxygen. Lungs with occasional expiratory wheezing. Occasional coarse cough noted.    EXTREMITIES:   Left side hemiparesis. Contracture to left wrist. He is able to tolerate PROM with wrist and hand.   SKIN: Warm and dry.   NEUROLOGIC: CVA with left-sided hemiparesis, pulses palpable  PSYCHIATRIC: Cognition intact.  Pleasant affect.        Assessment/Plan:  1. Chronic obstructive pulmonary disease with acute lower respiratory infection (H)       Pt with underlying COPD. He has had increased cough and congestion over the last week. CXR ordered. CXR showed no pulmonary vascular congestion. Peribronchial thickening in the mid and lower lung fields bilaterally consistent with bronchitis.He continues on Doxycyline and prednisone taper. Cough improving. He continues on nebs. He is only using oxygen at night now.       Electronically signed by: Sarah Smith, JORGE LUIS

## 2021-06-23 NOTE — PROGRESS NOTES
Code Status:  Full Code  Visit Type: Problem Visit     Facility:  [facility] [[id number]]         Facility Type: Long Term Care      History of Present Illness: Tariq Pinzon is a 68 y.o. male whom I am seeing today for follow-up of COPD exacerbation.  Patient recently seen and treated for acute on chronic COPD with exacerbation.  Patient continues on doxycycline as well as prednisone taper.  He is off his oxygen.  Continues on nebulizer treatments.  Continues with occasional coarse cough.  Expiratory wheezing resolved.  Bilateral lower lobes.  Continues with overall fatigue.  Steroid use.  Blood sugars occasionally elevated in the 200s.  Currently on glipizide.    Active Ambulatory Problems     Diagnosis Date Noted     CVA (cerebral vascular accident) (H) 03/19/2018     Left hemiparesis (H) 03/19/2018     Hypertension 03/19/2018     Dysarthria 03/19/2018     Dysphagia 03/19/2018     Diabetes mellitus (H) 03/22/2018     COPD (chronic obstructive pulmonary disease) (H) 04/16/2018     Resolved Ambulatory Problems     Diagnosis Date Noted     No Resolved Ambulatory Problems     Past Medical History:   Diagnosis Date     Abnormality of gait      Acute bronchospasm      Adjustment disorder with anxiety      Alcohol use disorder, mild, abuse      Asthma      CAD (coronary artery disease)      COPD (chronic obstructive pulmonary disease) (H)      CVA (cerebral vascular accident) (H)      DM2 (diabetes mellitus, type 2) (H)      Dysarthria      Dysphagia      HLD (hyperlipidemia)      HTN (hypertension)      Hyperglycemia      Hyperlipidemia      Hypertension      Left hemiparesis (H)      Long-term use of aspirin therapy      Myocardial infarction (H)      Pulmonary nodules      Tobacco abuse        Meds reviewed at the facility.     No Known Allergies      Review of Systems   No fevers or chills. No headache, lightheadedness or dizziness.  Patient again presenting with shortness of breath with exertion.   He is wearing oxygen again.  Continues with occasional cough. He feels the nebulizers are helpful. No chest pains or palpitations. Appetite is good.  He is drinking fluids.  No nausea, vomiting, constipation or diarrhea. No dysuria, frequency, burning or pain with urination. Otherwise review of systems are negative.       Physical Exam   PHYSICAL EXAMINATION  Vital signs: /69, pulse 73, respirations 16, O2 sat 95% on room air.  Temperature 97.9.  Current weight 138.2 pounds.  General: Awake, Alert, oriented x3, appropriately, follows simple commands, conversant. Sitting up in wheelchair.   HEENT: Slight left-sided facial droop.  Tongue is midline.   NECK: Supple, with  No lymphadenopathy  CARDIOVASCULAR: S1-S2 without murmur gallop.  RESPIRATORY: Patient off his oxygen today. Occasional coarse wet nonproductive cough. Crackles in BLL. Wheezing resolved.    EXTREMITIES:   Left side hemiparesis.   SKIN: Warm and dry.   NEUROLOGIC: CVA with left-sided hemiparesis, pulses palpable  PSYCHIATRIC: Cognition intact.  Pleasant affect.        Assessment/Plan:  1. Chronic obstructive pulmonary disease with acute lower respiratory infection (H)     2. Cerebrovascular accident (CVA), unspecified mechanism (H)     3. Type 2 diabetes mellitus with complication, without long-term current use of insulin (H)       Acute on chronic COPD with recurrent exacerbation.  He continues on doxycycline and prednisone taper.  Currently using nebulizers 4 times daily.  Also as needed guaifenesin.  Cough improving.  Expiratory wheezing resolved.  He does have crackles in bilateral lower lobes.  Steroid-induced hyperglycemia.  Continues on glipizide.  Blood sugars with occasional 200s.  Will initiate sliding scale.  He does have underlying CVA.    Electronically signed by: Sarah Smith, CNP

## 2021-06-23 NOTE — PROGRESS NOTES
Code Status:  Full Code  Visit Type: Problem Visit     Facility:  [facility] [[id number]]         Facility Type: Long Term Care    Date of Service 5/10/18    History of Present Illness: Tariq Pinzon is a 68 y.o. male whom I am seeing today at the patient's request.  Patient recently seen and treated for acute on chronic COPD with exacerbation.  Patient completed his oral antibiotics as well as a burst of prednisone.  He initially was improving however over the last 2 days increased cough and production of sputum.  He has been afebrile. Today he is wearing oxygen at 2L. He is very shortness of breath with talking.  He continues with a very wet coarse nonproductive cough.  He reports having difficulty expressing mucus.  He continues on nebulizers 4 times daily.      Active Ambulatory Problems     Diagnosis Date Noted     CVA (cerebral vascular accident) (H) 03/19/2018     Left hemiparesis (H) 03/19/2018     Hypertension 03/19/2018     Dysarthria 03/19/2018     Dysphagia 03/19/2018     Diabetes mellitus (H) 03/22/2018     COPD (chronic obstructive pulmonary disease) (H) 04/16/2018     Resolved Ambulatory Problems     Diagnosis Date Noted     No Resolved Ambulatory Problems     Past Medical History:   Diagnosis Date     Abnormality of gait      Acute bronchospasm      Adjustment disorder with anxiety      Alcohol use disorder, mild, abuse      Asthma      CAD (coronary artery disease)      COPD (chronic obstructive pulmonary disease) (H)      CVA (cerebral vascular accident) (H)      DM2 (diabetes mellitus, type 2) (H)      Dysarthria      Dysphagia      HLD (hyperlipidemia)      HTN (hypertension)      Hyperglycemia      Hyperlipidemia      Hypertension      Left hemiparesis (H)      Long-term use of aspirin therapy      Myocardial infarction (H)      Pulmonary nodules      Tobacco abuse        Meds reviewed at the facility.     No Known Allergies      Review of Systems   No fevers or chills. No  headache, lightheadedness or dizziness.  Patient again presenting with shortness of breath with exertion.  He is wearing oxygen again.  Continues with cough.  He tells me it kept him up last night.  He is having difficulty expelling mucus.  He feels the nebulizers are helpful. No chest pains or palpitations. Appetite is good.  He is drinking fluids.  No nausea, vomiting, constipation or diarrhea. No dysuria, frequency, burning or pain with urination. Otherwise review of systems are negative.       Physical Exam   PHYSICAL EXAMINATION  Vital signs: /66, heart rate 75, respirations 20, O2 sat 96% on 2 L. current weight 138.2 pounds.  General: Awake, Alert, oriented x3, appropriately, follows simple commands, conversant. Sitting up in wheelchair.   HEENT: Slight left-sided facial droop.  Tongue is midline.   NECK: Supple, with  No lymphadenopathy  CARDIOVASCULAR: S1-S2 without murmur gallop.  RESPIRATORY: Patient wearing oxygen again and today at 2 L nasal cannula.  Some dyspnea with talking.  Coarse wet nonproductive cough.  Tight coarse wheezing noted.   EXTREMITIES:   Left side hemiparesis.   SKIN: Warm and dry.   NEUROLOGIC: CVA with left-sided hemiparesis, pulses palpable  PSYCHIATRIC: Cognition intact.  Pleasant affect.        Assessment/Plan:  1. Chronic obstructive pulmonary disease with acute lower respiratory infection (H)       Acute on chronic COPD with recurrent exacerbation.  He was treated about 1 week ago with antibiotics and prednisone.  He did improve however the last 2 days presented with increased shortness of breath and coughing.  He is back on oxygen at 2 L nasal cannula.  He is currently afebrile.  I will resume his doxycycline twice daily for 7 days.  I will also resume his prednisone.  We will treat with a prednisone burst and taper starting with 30 mg daily times 5 days then reduce by 5 mg increments.  He will need monitoring of his blood sugars so I will increase this to 4 times daily  given the increased prednisone.  He may need sliding scale.  Will have staff call me if blood sugars are greater than 150.  Continues on nebulizers 4 times daily.         Electronically signed by: Sarah Smith CNP

## 2021-06-23 NOTE — PROGRESS NOTES
Code Status:  Full Code  Visit Type: Problem Visit     Facility:  [facility] [[id number]]         Facility Type: Long Term Care    Date of Service 5/10/18    History of Present Illness: Tariq Pinzon is a 68 y.o. male whom I am seeing today for follow-up of COPD exacerbation.  Patient recently seen and treated for acute on chronic COPD with exacerbation.  Patient continues on doxycycline as well as prednisone taper.  Today he is awaiting today a haircut.  He is off his oxygen.  No dyspnea at rest.  Slight dyspnea with talking.  Continues with a very wet coarse nonproductive cough.  Afebrile.  He is eating and drinking well.  Reports some difficulty expelling mucus.  Continues on nebulizers 4 times daily.      Active Ambulatory Problems     Diagnosis Date Noted     CVA (cerebral vascular accident) (H) 03/19/2018     Left hemiparesis (H) 03/19/2018     Hypertension 03/19/2018     Dysarthria 03/19/2018     Dysphagia 03/19/2018     Diabetes mellitus (H) 03/22/2018     COPD (chronic obstructive pulmonary disease) (H) 04/16/2018     Resolved Ambulatory Problems     Diagnosis Date Noted     No Resolved Ambulatory Problems     Past Medical History:   Diagnosis Date     Abnormality of gait      Acute bronchospasm      Adjustment disorder with anxiety      Alcohol use disorder, mild, abuse      Asthma      CAD (coronary artery disease)      COPD (chronic obstructive pulmonary disease) (H)      CVA (cerebral vascular accident) (H)      DM2 (diabetes mellitus, type 2) (H)      Dysarthria      Dysphagia      HLD (hyperlipidemia)      HTN (hypertension)      Hyperglycemia      Hyperlipidemia      Hypertension      Left hemiparesis (H)      Long-term use of aspirin therapy      Myocardial infarction (H)      Pulmonary nodules      Tobacco abuse        Meds reviewed at the facility.     No Known Allergies      Review of Systems   No fevers or chills. No headache, lightheadedness or dizziness.  Patient again  presenting with shortness of breath with exertion.  He is wearing oxygen again.  Continues with cough.  He is having difficulty expelling mucus.  He feels the nebulizers are helpful. No chest pains or palpitations. Appetite is good.  He is drinking fluids.  No nausea, vomiting, constipation or diarrhea. No dysuria, frequency, burning or pain with urination. Otherwise review of systems are negative.       Physical Exam   PHYSICAL EXAMINATION  Vital signs: /66, heart rate 75, respirations 16, temperature 97.6, O2 sat 100% on 2 L.  Current weight 138.2 pounds.  General: Awake, Alert, oriented x3, appropriately, follows simple commands, conversant. Sitting up in wheelchair.   HEENT: Slight left-sided facial droop.  Tongue is midline.   NECK: Supple, with  No lymphadenopathy  CARDIOVASCULAR: S1-S2 without murmur gallop.  RESPIRATORY: Patient off his oxygen today.  No dyspnea at rest.  Continues with coarse wet nonproductive cough.  Expiratory wheezing noted.   EXTREMITIES:   Left side hemiparesis.   SKIN: Warm and dry.   NEUROLOGIC: CVA with left-sided hemiparesis, pulses palpable  PSYCHIATRIC: Cognition intact.  Pleasant affect.        Assessment/Plan:  1. Chronic obstructive pulmonary disease with acute lower respiratory infection (H)       Acute on chronic COPD with recurrent exacerbation.  He continues on doxycycline.  Continues on prednisone taper.  He is off his oxygen today.  He does wear this at night.  Less dyspnea at rest.  Continues with coarse wet cough.  Difficulty expelling sputum.  Will order Crystal [place] 10 cc p.o. every 4 hours as needed.  He continues with nebulizers 4 times daily.    Electronically signed by: Sarah Smith CNP

## 2021-06-24 NOTE — PROGRESS NOTES
Code Status:  Full Code  Visit Type: Problem Visit     Facility:  [facility] [[id number]]         Facility Type: Long Term Care      History of Present Illness: Tariq Pinzon is a 69 y.o. male whom I am seeing today for pharmacy recommendation regarding mx inhalers and nebs.  Past medical history includes COPD, CVA with left-sided paresis, hypertension, dysphagia, diabetes type 2, CAD and hypertension.  Patient with recent COPD exacerbation.  He has completed his antibiotic therapy as well as prednisone taper.  Pharmacy recommendation asked to review current orders.  He has multiple as needed nebulizers as well as inhalers including Atrovent, ipratropium, albuterol and Advair Diskus.  Patient is very reluctant to take medication.  He does prefer inhalers however he continues with occasional wet nonproductive coarse cough.  He will not take more steroids for maintenance.  Has been asking for nebs in the evening.  I did discuss this at length with him.  If he would take the DuoNeb it would have both the albuterol and ipratropium therefore we could reduce some of the different medications.  He will continue to need Advair Diskus.        Active Ambulatory Problems     Diagnosis Date Noted     CVA (cerebral vascular accident) (H) 03/19/2018     Left hemiparesis (H) 03/19/2018     Hypertension 03/19/2018     Dysarthria 03/19/2018     Dysphagia 03/19/2018     Diabetes mellitus (H) 03/22/2018     COPD (chronic obstructive pulmonary disease) (H) 04/16/2018     Resolved Ambulatory Problems     Diagnosis Date Noted     No Resolved Ambulatory Problems     Past Medical History:   Diagnosis Date     Abnormality of gait      Acute bronchospasm      Adjustment disorder with anxiety      Alcohol use disorder, mild, abuse      Asthma      CAD (coronary artery disease)      COPD (chronic obstructive pulmonary disease) (H)      CVA (cerebral vascular accident) (H)      DM2 (diabetes mellitus, type 2) (H)       Dysarthria      Dysphagia      HLD (hyperlipidemia)      HTN (hypertension)      Hyperglycemia      Hyperlipidemia      Hypertension      Left hemiparesis (H)      Long-term use of aspirin therapy      Myocardial infarction (H)      Pulmonary nodules      Tobacco abuse        Meds reviewed at the facility.     No Known Allergies      Review of Systems   No fevers or chills. No headache, lightheadedness or dizziness.  Patient again presenting with shortness of breath with exertion.  He is wearing oxygen again.  Continues with occasional cough. He feels the nebulizers are helpful. No chest pains or palpitations. Appetite is good.  He is drinking fluids.  No nausea, vomiting, constipation or diarrhea. No dysuria, frequency, burning or pain with urination. Otherwise review of systems are negative.       Physical Exam   PHYSICAL EXAMINATION  Vital signs: BP 89/51, pulse 76, respirations 20, temperature 98.3, O2 sat 95% on room air.  Current weight 142.8pounds.  General: Awake, Alert, oriented x3, appropriately, follows simple commands, conversant. Sitting up in wheelchair.   HEENT: Slight left-sided facial droop.  Tongue is midline.   NECK: Supple, with  No lymphadenopathy  CARDIOVASCULAR: S1-S2 without murmur gallop.  RESPIRATORY: Patient off his oxygen today. Occasional coarse wet nonproductive cough. No wheezes, rales or rhonchi.    EXTREMITIES:   Left side hemiparesis.   SKIN: Warm and dry.   NEUROLOGIC: CVA with left-sided hemiparesis, pulses palpable  PSYCHIATRIC: Cognition intact.  Pleasant affect.        Assessment/Plan:  1. Chronic obstructive pulmonary disease with acute lower respiratory infection (H)       Patient with multiple different foot drug regimen for his COPD.  Patient will continue on Advair Diskus 1 puff twice daily.  I will DC his pro-air.  I will DC his albuterol nebs.  Will institute ipratropium albuterol nebs twice daily scheduled as well as every 4 hours as needed.  I will DC his ipratropium  inhaler.      Electronically signed by: Sarah Smith, JORGE LUIS

## 2021-06-24 NOTE — PROGRESS NOTES
Code Status:  Full Code  Visit Type: Problem Visit     Facility:  [facility] [[id number]]         Facility Type: Long Term Care      History of Present Illness: Tariq Pinzon is a 69 y.o. male whom I am seeing today for follow up of respiratory status.   Past medical history includes COPD, CVA with left-sided paresis, hypertension, dysphagia, diabetes type 2, CAD and hypertension.  Patient with recent COPD exacerbation.  He has completed his antibiotic therapy as well as prednisone taper.  Late last week patient having increased cough and congestion.  Follow-up brown mucus per the resident.  He was given.  Crystal [place].  He did require oxygen at 3 L over the weekend.  Patient continues on nebulizers and inhalers.    Today patient sitting up in wheelchair.  He tells me he has been coughing up thick brown tinged sputum.  States overall feels much better now that he was able to cough up and out all this mucus.   Lung sounds are clear. Cough dry. He is a-febrile. He has mx skin lesions to his LE. He has been picking at the areas and they are very red.         Active Ambulatory Problems     Diagnosis Date Noted     CVA (cerebral vascular accident) (H) 03/19/2018     Left hemiparesis (H) 03/19/2018     Hypertension 03/19/2018     Dysarthria 03/19/2018     Dysphagia 03/19/2018     Diabetes mellitus (H) 03/22/2018     COPD (chronic obstructive pulmonary disease) (H) 04/16/2018     Resolved Ambulatory Problems     Diagnosis Date Noted     No Resolved Ambulatory Problems     Past Medical History:   Diagnosis Date     Abnormality of gait      Acute bronchospasm      Adjustment disorder with anxiety      Alcohol use disorder, mild, abuse      Asthma      CAD (coronary artery disease)      COPD (chronic obstructive pulmonary disease) (H)      CVA (cerebral vascular accident) (H)      DM2 (diabetes mellitus, type 2) (H)      Dysarthria      Dysphagia      HLD (hyperlipidemia)      HTN (hypertension)       Hyperglycemia      Hyperlipidemia      Hypertension      Left hemiparesis (H)      Long-term use of aspirin therapy      Myocardial infarction (H)      Pulmonary nodules      Tobacco abuse        Meds reviewed at the facility.     No Known Allergies      Review of Systems   No fevers or chills. No headache, lightheadedness or dizziness.  Shortness of breath improving.  Occasional use of oxygen.  Continues with occasional cough however much improved.. He feels the nebulizers are helpful. No chest pains or palpitations. Appetite is good.  He is drinking fluids.  No nausea, vomiting, constipation or diarrhea. No dysuria, frequency, burning or pain with urination.  Denies picking at his leg wounds.  Otherwise review of systems are negative.       Physical Exam   PHYSICAL EXAMINATION  Vital signs: /53, P 86, R 24, T 98, O2 sat 94% on room air.  Current weight 142.8pounds.  General: Awake, Alert, oriented x3, appropriately, follows simple commands, conversant. Sitting up in wheelchair.   HEENT: Slight left-sided facial droop.  Tongue is midline.   NECK: Supple, with  No lymphadenopathy  CARDIOVASCULAR: S1-S2 without murmur gallop.  RESPIRATORY: Patient off his oxygen today.  No wheezes rales or rhonchi.  Dry.  ABDOMEN: Soft nondistended.  EXTREMITIES:   Left side hemiparesis.  No lower extremity edema.  Moves all extremities.  SKIN: Multiple skin lesions to lower extremities.  Right leg with redness surrounding lesions.  Appears as the patient has been picking at the area.  Some bleeding noted.  NEUROLOGIC: CVA with left-sided hemiparesis, pulses palpable  PSYCHIATRIC: Cognition intact.  Pleasant affect.        Assessment/Plan:  1. Chronic obstructive pulmonary disease with acute lower respiratory infection (H)     2. Skin lesion of right leg       Patient with recent increased coughing with increased production of phlegm no shortness of breath.  Continues on nebulizer treatments.  Reported home over the weekend.   He was given Crystal [place].  Today off his oxygen.  Lung sounds clear.  Continues with nebulizers and inhalers.  Dry cough on exam.  Refuses prednisone for maintenance.  Skin lesions of the lower extremities with prominent lesions on right leg.  This is wax and wane.  Could be given his diabetes and for prednisone use.  Appears somewhat inflammed.  It appears he has been picking at the areas.  Bactroban twice daily lesions cover with dry sterile dressing as needed.      Electronically signed by: Sarah Smith CNP

## 2021-06-25 NOTE — PROGRESS NOTES
Code Status:  Full Code  Visit Type: Problem Visit     Facility:  [facility] [place]  [[id number]]         Facility Type: Long Term Care      History of Present Illness: Tariq Pinzon is a 69 y.o. male whom I am seeing today for follow up of recent pneumonia.  Past medical history includes COPD, CVA with left-sided paresis, hypertension, dysphagia, diabetes type 2, CAD and hypertension.  Patient with recent episode in which she became very weak with slurred speech.  He does have a history of CVA.  He was sent to the ER.  He was found to have left pleural effusion with mild leukocytosis.  White blood cell count 11.3.  He was placed on oral antibiotics.  He continues on Keflex.  However he was on this prior.  He did complete his Cefdinir.  He is afebrile.  He is off his oxygen during the day.  He does wear oxygen at night.  That is his baseline.  Continues on nebulizers.  Cough is dry.  He states he is only producing white phlegm.  There was a call from the hospital to his daughter in regards to a urgent need for follow-up with pulmonology.  There were some suspicious of nodule versus mass according to the notes in the record.  I do not have his hospitalization records.  He was not at a [facility] facility.  He is refusing follow-up pulmonary consult as well as chest CT.  I did speak with him at length about this today.  I talked with him in regards to possible lung CA.  He tells me he wishes not to know if he does have cancer.  He does report feelings of anger towards his children today.  He also told nursing staff that he would like to be a DNR/DNI.        Active Ambulatory Problems     Diagnosis Date Noted     CVA (cerebral vascular accident) (H) 03/19/2018     Left hemiparesis (H) 03/19/2018     Hypertension 03/19/2018     Dysarthria 03/19/2018     Dysphagia 03/19/2018     Diabetes mellitus (H) 03/22/2018     COPD (chronic obstructive pulmonary disease) (H) 04/16/2018     Resolved Ambulatory Problems      Diagnosis Date Noted     No Resolved Ambulatory Problems     Past Medical History:   Diagnosis Date     Abnormality of gait      Acute bronchospasm      Adjustment disorder with anxiety      Alcohol use disorder, mild, abuse      Asthma      CAD (coronary artery disease)      COPD (chronic obstructive pulmonary disease) (H)      CVA (cerebral vascular accident) (H)      DM2 (diabetes mellitus, type 2) (H)      Dysarthria      Dysphagia      HLD (hyperlipidemia)      HTN (hypertension)      Hyperglycemia      Hyperlipidemia      Hypertension      Left hemiparesis (H)      Long-term use of aspirin therapy      Myocardial infarction (H)      Pulmonary nodules      Tobacco abuse        Meds reviewed at the facility.     No Known Allergies      Review of Systems   No fevers or chills. No headache, lightheadedness or dizziness.  Denies shortness of breath or chest pain.  Only using oxygen at night.  Reports only a dry cough.  Appetite is good. No nausea, vomiting, constipation or diarrhea. No dysuria, frequency, burning or pain with urination.  Reports feelings of anger towards his children.  Otherwise review of systems are negative.       Physical Exam   PHYSICAL EXAMINATION  Vital signs: /69, P 62, R 17, T 98.4, O2 sat 90%.   Current weight 140.6 pounds.  General: Awake, Alert, oriented x3, appropriately, follows simple commands, conversant. Sitting up in wheelchair.   HEENT: Slight left-sided facial droop.  Tongue is midline.   NECK: Supple, with  No lymphadenopathy  CARDIOVASCULAR: S1-S2 without murmur gallop.  RESPIRATORY: Patient off his oxygen today.  No wheezes rales or rhonchi.  Dry cough.  ABDOMEN: Soft nondistended.  EXTREMITIES:   Left side hemiparesis.  No lower extremity edema.  Moves all extremities.  SKIN: Multiple skin lesions to lower extremities healing.  NEUROLOGIC: CVA with left-sided hemiparesis, pulses palpable  PSYCHIATRIC: Cognition intact.  Angry.      Assessment/Plan:  1. Chronic  obstructive pulmonary disease with acute lower respiratory infection (H)     2. Pneumonia of left lower lobe due to infectious organism (H)       Patient with underlying COPD.  Recently treated for pneumonia and bronchitis.  Recent episode of increased lethargy with slurred speech.  He was seen at the ER.  Treated with left lower lobe pneumonia.  According to the notes there was a CT scan which showed possible nodules versus mass.  He was to have a follow-up CT scan as well as pulmonology visit however he is refusing this.  He also refuses again today.  His wishes to not know if he has lung cancer.  He continues to use oxygen at night.  Is completed his cefdinir.  He does continue on some Keflex for lower extremity lesions.  He has 3 more days of this.  He continues on nebulizer treatments.  Cough improved.  He has completed his prednisone taper.  Will repeat CBC on Thursday.  Patient wishes to be DNR/DNI.  We will change his CODE STATUS.      Electronically signed by: Sarah Smith CNP